# Patient Record
Sex: FEMALE | Race: WHITE | NOT HISPANIC OR LATINO | Employment: FULL TIME | URBAN - METROPOLITAN AREA
[De-identification: names, ages, dates, MRNs, and addresses within clinical notes are randomized per-mention and may not be internally consistent; named-entity substitution may affect disease eponyms.]

---

## 2017-05-22 ENCOUNTER — EH NON-PROVIDER (OUTPATIENT)
Dept: OCCUPATIONAL MEDICINE | Facility: CLINIC | Age: 22
End: 2017-05-22

## 2017-05-22 ENCOUNTER — EMPLOYEE HEALTH (OUTPATIENT)
Dept: OCCUPATIONAL MEDICINE | Facility: CLINIC | Age: 22
End: 2017-05-22

## 2017-05-22 VITALS
RESPIRATION RATE: 16 BRPM | OXYGEN SATURATION: 94 % | DIASTOLIC BLOOD PRESSURE: 82 MMHG | HEIGHT: 64 IN | TEMPERATURE: 98.2 F | BODY MASS INDEX: 19.12 KG/M2 | SYSTOLIC BLOOD PRESSURE: 108 MMHG | HEART RATE: 92 BPM | WEIGHT: 112 LBS

## 2017-05-22 DIAGNOSIS — Z02.1 PRE-EMPLOYMENT HEALTH SCREENING EXAMINATION: ICD-10-CM

## 2017-05-22 DIAGNOSIS — Z02.1 PRE-EMPLOYMENT DRUG SCREENING: ICD-10-CM

## 2017-05-22 LAB
AMP AMPHETAMINE: NORMAL
BAR BARBITURATES: NORMAL
BZO BENZODIAZEPINES: NORMAL
COC COCAINE: NORMAL
INT CON NEG: NORMAL
INT CON POS: NORMAL
MDMA ECSTASY: NORMAL
MET METHAMPHETAMINES: NORMAL
MTD METHADONE: NORMAL
OPI OPIATES: NORMAL
OXY OXYCODONE: NORMAL
PCP PHENCYCLIDINE: NORMAL
POC URINE DRUG SCREEN OCDRS: NEGATIVE
THC: NORMAL

## 2017-05-22 PROCEDURE — 90715 TDAP VACCINE 7 YRS/> IM: CPT | Performed by: PREVENTIVE MEDICINE

## 2017-05-22 PROCEDURE — 80305 DRUG TEST PRSMV DIR OPT OBS: CPT | Performed by: PREVENTIVE MEDICINE

## 2017-05-22 PROCEDURE — 8915 PR COMPREHENSIVE PHYSICAL: Performed by: PREVENTIVE MEDICINE

## 2017-05-22 ASSESSMENT — VISUAL ACUITY
OD_CC: 20/25
OS_CC: 20/25

## 2017-05-22 NOTE — PROGRESS NOTES
1. Drug Screen complete  2. Immunizations  - Quantiferon  draw  - Varicella  draw  - MMR  doc  - Hep B  doc  - Tdap  doc   - Flu   doc  3. Mask Fit capr only faield n95  4. Physical Clearance complete

## 2017-05-22 NOTE — MR AVS SNAPSHOT
"Barb Hilton   2017 9:20 AM    Non-Provider   MRN: [id number]    Department:  [facility]   Dept Phone:  [phone]    Description:  Female : 1995   Provider:  [facility] XI MATHIS RJONH           Reason for Visit     Other [facility] Pre-employment clearance      Allergies as of 2017     No Known Allergies      You were diagnosed with     Pre-employment drug screening   [127956]       Pre-employment health screening examination   [167785]         Vital Signs     Blood Pressure Pulse Temperature Respirations Height Weight    108/82 mmHg 92 36.8 °C (98.2 °F) 16 1.626 m (5' 4\") 50.803 kg (112 lb)    Body Mass Index Oxygen Saturation Smoking Status             19.22 kg/m2 94% Never Smoker          Basic Information     Date Of Birth Sex Race Ethnicity Preferred Language    1995 Female White,  or other  Non- English      Your appointments     May 31, 2017  7:40 AM   Established Patient with Ramila Lopez M.D.   [address] [address] [phone]           You will be receiving a confirmation call a few days before your appointment from our automated call confirmation system.   Please bring to your appointment; a photo ID, copies of your insurance card, all medication bottles and any co-pay that you are responsible for.  Please allow 45-60 minutes to complete your scheduled appointment.              Problem List              ICD-10-CM Priority Class Noted - Resolved    Anxiety F41.9   2016 - Present    Encounter for initial prescription of contraceptive pills Z30.011   11/15/2016 - Present      Health Maintenance        Date Due Completion Dates    IMM HEP B VACCINE (1 of 3 - Primary Series) 1995 ---    IMM HEP A VACCINE (1 of 2 - Standard Series) 1996 ---    IMM VARICELLA (CHICKENPOX) VACCINE (1 of 2 - 2 Dose Adolescent Series) 2008 ---    IMM MENINGOCOCCAL " VACCINE (MCV4) (1 of 1) 8/4/2011 ---    IMM DTaP/Tdap/Td Vaccine (1 - Tdap) 8/4/2014 ---    PAP SMEAR 12/6/2019 12/6/2016            Results     POCT 11 Panel Urine Drug Screen      Component    AMPHETAMINE    COCAINE    POC THC    METHAMPHETAMINES    OPIATES    PHENCYCLIDINE    BENZODIAZIPINES    BARBITURATES    METHADONE    MDMA Ecstasy    OXYCODONE    Urine Drug Screen    NEGATIVE    Internal Control Positive    Valid    Internal Control Negative    Valid                        Current Immunizations     HPV 9-VALENT VACCINE (GARDASIL 9) 12/4/2015    HPV Quadrivalent Vaccine (GARDASIL) 2/25/2015, 11/13/2014    Influenza Vaccine Quad Inj (Pf) 8/16/2016    Tdap Vaccine 5/22/2017 10:18 AM      Below and/or attached are the medications your provider expects you to take. Review all of your home medications and newly ordered medications with your provider and/or pharmacist. Follow medication instructions as directed by your provider and/or pharmacist. Please keep your medication list with you and share with your provider. Update the information when medications are discontinued, doses are changed, or new medications (including over-the-counter products) are added; and carry medication information at all times in the event of emergency situations     Allergies:  No Known Allergies          Medications  Valid as of: May 22, 2017 - 10:39 AM    Generic Name Brand Name Tablet Size Instructions for use    ALPRAZolam (Tab) XANAX 0.5 MG Take 1 Tab by mouth 2 times a day as needed for Sleep or Anxiety.        Escitalopram Oxalate (Tab) LEXAPRO 20 MG Take 1 Tab by mouth every day.        Fluticasone Propionate (Suspension) FLONASE 50 MCG/ACT Spray 1 Spray in nose every day.        Mupirocin (Ointment) BACTROBAN 2 % Apply 1 Application to affected area(s) 3 times a day.        Norethin Ace-Eth Estrad-FE (Tab) JUNEL FE 1/20 1-20 MG-MCG Take 1 Tab by mouth every day.        .                 Medicines prescribed today were sent to:      [facility] - [place], [place] - [address] AT [facility] [address]    [address] [place] [address]    Phone: [phone] Fax: [phone]    Open 24 Hours?: No      Medication refill instructions:       If your prescription bottle indicates you have medication refills left, it is not necessary to call your provider’s office. Please contact your pharmacy and they will refill your medication.    If your prescription bottle indicates you do not have any refills left, you may request refills at any time through one of the following ways: The online The[employer] system (except Urgent Care), by calling your provider’s office, or by asking your pharmacy to contact your provider’s office with a refill request. Medication refills are processed only during regular business hours and may not be available until the next business day. Your provider may request additional information or to have a follow-up visit with you prior to refilling your medication.   *Please Note: Medication refills are assigned a new Rx number when refilled electronically. Your pharmacy may indicate that no refills were authorized even though a new prescription for the same medication is available at the pharmacy. Please request the medicine by name with the pharmacy before contacting your provider for a refill.           TheTake Access Code: Activation code not generated  Current TheTake Status: Active

## 2017-05-22 NOTE — MR AVS SNAPSHOT
Barb Hilton   2017 10:00 AM   Employee Health   MRN: [id number]    Department:  [facility]   Dept Phone:  [phone]    Description:  Female : 1995   Provider:  Bo Kim M.D.           Reason for Visit     Other Procedure rm 1 physical clearance      Allergies as of 2017     No Known Allergies      You were diagnosed with     Pre-employment health screening examination   [903819]         Vital Signs     Smoking Status                   Never Smoker            Basic Information     Date Of Birth Sex Race Ethnicity Preferred Language    1995 Female White,  or other  Non- English      Your appointments     May 31, 2017  7:40 AM   Established Patient with Ramila Lopez M.D.   [address] [address] [phone]           You will be receiving a confirmation call a few days before your appointment from our automated call confirmation system.   Please bring to your appointment; a photo ID, copies of your insurance card, all medication bottles and any co-pay that you are responsible for.  Please allow 45-60 minutes to complete your scheduled appointment.              Problem List              ICD-10-CM Priority Class Noted - Resolved    Anxiety F41.9   2016 - Present    Encounter for initial prescription of contraceptive pills Z30.011   11/15/2016 - Present      Health Maintenance        Date Due Completion Dates    IMM HEP B VACCINE (1 of 3 - Primary Series) 1995 ---    IMM HEP A VACCINE (1 of 2 - Standard Series) 1996 ---    IMM VARICELLA (CHICKENPOX) VACCINE (1 of 2 - 2 Dose Adolescent Series) 2008 ---    IMM MENINGOCOCCAL VACCINE (MCV4) (1 of 1) 2011 ---    IMM DTaP/Tdap/Td Vaccine (1 - Tdap) 2014 ---    PAP SMEAR 2019            Current Immunizations     HPV 9-VALENT VACCINE (GARDASIL 9) 2015    HPV Quadrivalent  Vaccine (GARDASIL) 2/25/2015, 11/13/2014    Influenza Vaccine Quad Inj (Pf) 8/16/2016    Tdap Vaccine 5/22/2017 10:18 AM      Below and/or attached are the medications your provider expects you to take. Review all of your home medications and newly ordered medications with your provider and/or pharmacist. Follow medication instructions as directed by your provider and/or pharmacist. Please keep your medication list with you and share with your provider. Update the information when medications are discontinued, doses are changed, or new medications (including over-the-counter products) are added; and carry medication information at all times in the event of emergency situations     Allergies:  No Known Allergies          Medications  Valid as of: May 22, 2017 - 12:47 PM    Generic Name Brand Name Tablet Size Instructions for use    ALPRAZolam (Tab) XANAX 0.5 MG Take 1 Tab by mouth 2 times a day as needed for Sleep or Anxiety.        Escitalopram Oxalate (Tab) LEXAPRO 20 MG Take 1 Tab by mouth every day.        Fluticasone Propionate (Suspension) FLONASE 50 MCG/ACT Spray 1 Spray in nose every day.        Mupirocin (Ointment) BACTROBAN 2 % Apply 1 Application to affected area(s) 3 times a day.        Norethin Ace-Eth Estrad-FE (Tab) JUNEL FE 1/20 1-20 MG-MCG Take 1 Tab by mouth every day.        .                 Medicines prescribed today were sent to:     [facility] [address] [place] [address] - [address] AT [facility]. [address]    [address] [facility] [address]    Phone: [phone] Fax: [phone]    Open 24 Hours?: No      Medication refill instructions:       If your prescription bottle indicates you have medication refills left, it is not necessary to call your provider’s office. Please contact your pharmacy and they will refill your medication.    If your prescription bottle indicates you do not have any refills left, you may request refills at any time through one of the  following ways: The online [employer] system (except Urgent Care), by calling your provider’s office, or by asking your pharmacy to contact your provider’s office with a refill request. Medication refills are processed only during regular business hours and may not be available until the next business day. Your provider may request additional information or to have a follow-up visit with you prior to refilling your medication.   *Please Note: Medication refills are assigned a new Rx number when refilled electronically. Your pharmacy may indicate that no refills were authorized even though a new prescription for the same medication is available at the pharmacy. Please request the medicine by name with the pharmacy before contacting your provider for a refill.           [employer] Access Code: Activation code not generated  Current [employer] Status: Active

## 2017-05-31 ENCOUNTER — OFFICE VISIT (OUTPATIENT)
Dept: MEDICAL GROUP | Facility: PHYSICIAN GROUP | Age: 22
End: 2017-05-31
Payer: COMMERCIAL

## 2017-05-31 VITALS
OXYGEN SATURATION: 97 % | RESPIRATION RATE: 12 BRPM | HEART RATE: 86 BPM | HEIGHT: 64 IN | BODY MASS INDEX: 18.95 KG/M2 | TEMPERATURE: 97.6 F | SYSTOLIC BLOOD PRESSURE: 112 MMHG | WEIGHT: 111 LBS | DIASTOLIC BLOOD PRESSURE: 72 MMHG

## 2017-05-31 DIAGNOSIS — F41.9 ANXIETY: ICD-10-CM

## 2017-05-31 PROCEDURE — 99213 OFFICE O/P EST LOW 20 MIN: CPT | Performed by: FAMILY MEDICINE

## 2017-05-31 RX ORDER — ESCITALOPRAM OXALATE 20 MG/1
20 TABLET ORAL DAILY
Qty: 90 TAB | Refills: 3 | Status: SHIPPED | OUTPATIENT
Start: 2017-05-31 | End: 2018-08-09 | Stop reason: SDUPTHER

## 2017-05-31 RX ORDER — ALPRAZOLAM 0.5 MG/1
0.5 TABLET ORAL 2 TIMES DAILY PRN
Qty: 30 TAB | Refills: 1 | Status: SHIPPED
Start: 2017-05-31 | End: 2019-12-11 | Stop reason: SDUPTHER

## 2017-05-31 ASSESSMENT — PATIENT HEALTH QUESTIONNAIRE - PHQ9: CLINICAL INTERPRETATION OF PHQ2 SCORE: 0

## 2017-05-31 NOTE — PROGRESS NOTES
Chief Complaint   Patient presents with   • Medication Refill       HISTORY OF PRESENT ILLNESS: Patient is a 21 y.o. female established patient here today for the following concerns:    1. Anxiety  Here for follow up on anxiety.  Reports that the mood has been doing good.  She is in nursing school now.  Reports mostly anxiety about typical things which she feels is a normal response.  She has only had to use xanax a handful of times.  She does need a refill, but requests a smaller quantity so they don't .  No SI/HI>  Sleep is restorative.       Past Medical, Social, and Family history reviewed and updated in EPIC    Allergies:Review of patient's allergies indicates no known allergies.    Current Outpatient Prescriptions   Medication Sig Dispense Refill   • escitalopram (LEXAPRO) 20 MG tablet Take 1 Tab by mouth every day. 90 Tab 3   • alprazolam (XANAX) 0.5 MG Tab Take 1 Tab by mouth 2 times a day as needed for Sleep or Anxiety. 30 Tab 1   • norethindrone-ethinyl estradiol (MICROGESTIN FE ) 1-20 MG-MCG per tablet Take 1 Tab by mouth every day. 90 Tab 3   • mupirocin (BACTROBAN) 2 % Ointment Apply 1 Application to affected area(s) 3 times a day. 1 Tube 0   • fluticasone (FLONASE) 50 MCG/ACT nasal spray Spray 1 Spray in nose every day. 16 g 0     No current facility-administered medications for this visit.         ROS:  Review of Systems   Constitutional: Negative for fever, chills, weight loss and malaise/fatigue.   HENT: Negative for ear pain, nosebleeds, congestion, sore throat and neck pain.    Eyes: Negative for blurred vision.   Respiratory: Negative for cough, sputum production, shortness of breath and wheezing.    Cardiovascular: Negative for chest pain, palpitations,  and leg swelling.   Gastrointestinal: Negative for heartburn, nausea, vomiting, diarrhea and abdominal pain.   Genitourinary: Negative for dysuria, urgency and frequency.   Musculoskeletal: Negative for myalgias, back pain and joint  "pain.   Skin: Negative for rash and itching.   Neurological: Negative for dizziness, tingling, tremors, sensory change, focal weakness and headaches.   Endo/Heme/Allergies: Does not bruise/bleed easily.   Psychiatric/Behavioral: Negative for depression, anxiety, suicidal ideas, insomnia and memory loss.      Exam:  Blood pressure 112/72, pulse 86, temperature 36.4 °C (97.6 °F), resp. rate 12, height 1.619 m (5' 3.74\"), weight 50.349 kg (111 lb), last menstrual period 05/23/2017, SpO2 97 %.    General:  Well nourished, well developed in NAD  Head is grossly normal.  Neck: Supple without JVD   Pulmonary:  Normal effort.   Cardiovascular: Regular rate  Extremities: no clubbing, cyanosis, or edema.  Psych: affect appropriate      Please note that this dictation was created using voice recognition software. I have made every reasonable attempt to correct obvious errors, but I expect that there are errors of grammar and possibly content that I did not discover before finalizing the note.    Assessment/Plan:  1. Anxiety  continue  - escitalopram (LEXAPRO) 20 MG tablet; Take 1 Tab by mouth every day.  Dispense: 90 Tab; Refill: 3  - alprazolam (XANAX) 0.5 MG Tab; Take 1 Tab by mouth 2 times a day as needed for Sleep or Anxiety.  Dispense: 30 Tab; Refill: 1    No Follow-up on file.          "

## 2017-05-31 NOTE — MR AVS SNAPSHOT
"        Barb Hilton   2017 7:40 AM   Office Visit   MRN: [id number]    Department:  [facility]   Dept Phone:  [phone]    Description:  Female : 1995   Provider:  Ramila Lopez M.D.           Reason for Visit     Medication Refill           Allergies as of 2017     No Known Allergies      You were diagnosed with     Anxiety   [931122]         Vital Signs     Blood Pressure Pulse Temperature Respirations Height Weight    112/72 mmHg 86 36.4 °C (97.6 °F) 12 1.619 m (5' 3.74\") 50.349 kg (111 lb)    Body Mass Index Oxygen Saturation Last Menstrual Period Smoking Status          19.21 kg/m2 97% 2017 Never Smoker         Basic Information     Date Of Birth Sex Race Ethnicity Preferred Language    1995 Female White,  or other  Non- English      Problem List              ICD-10-CM Priority Class Noted - Resolved    Anxiety F41.9   2016 - Present    Encounter for initial prescription of contraceptive pills Z30.011   11/15/2016 - Present      Health Maintenance        Date Due Completion Dates    IMM HEP A VACCINE (1 of 2 - Standard Series) 1996 ---    IMM VARICELLA (CHICKENPOX) VACCINE (1 of 2 - 2 Dose Adolescent Series) 2008 ---    IMM MENINGOCOCCAL VACCINE (MCV4) (1 of 1) 2011 ---    PAP SMEAR 2019    IMM DTaP/Tdap/Td Vaccine (2 - Td) 2027            Current Immunizations     HPV 9-VALENT VACCINE (GARDASIL 9) 2015    HPV Quadrivalent Vaccine (GARDASIL) 2015, 2014    Hepatitis B Vaccine Non-Recombivax (Ped/Adol) 5/3/1996, 1995, 1995    Influenza TIV (IM) 2016    Influenza Vaccine Quad Inj (Pf) 2016    MMR Vaccine 1999, 1996    Tdap Vaccine 2017 10:18 AM      Below and/or attached are the medications your provider expects you to take. Review all of your home medications and newly ordered medications with your provider and/or pharmacist. Follow " medication instructions as directed by your provider and/or pharmacist. Please keep your medication list with you and share with your provider. Update the information when medications are discontinued, doses are changed, or new medications (including over-the-counter products) are added; and carry medication information at all times in the event of emergency situations     Allergies:  No Known Allergies          Medications  Valid as of: May 31, 2017 -  8:28 AM    Generic Name Brand Name Tablet Size Instructions for use    ALPRAZolam (Tab) XANAX 0.5 MG Take 1 Tab by mouth 2 times a day as needed for Sleep or Anxiety.        Escitalopram Oxalate (Tab) LEXAPRO 20 MG Take 1 Tab by mouth every day.        Fluticasone Propionate (Suspension) FLONASE 50 MCG/ACT Spray 1 Spray in nose every day.        Mupirocin (Ointment) BACTROBAN 2 % Apply 1 Application to affected area(s) 3 times a day.        Norethin Ace-Eth Estrad-FE (Tab) JUNEL FE 1/20 1-20 MG-MCG Take 1 Tab by mouth every day.        .                 Medicines prescribed today were sent to:     [facility] [address] [place] [address] [place], [place] - [address] AT [facility] OF [employer] [facility]. & [address] [place]    [address] [place] [address]    Phone: [phone] Fax: [phone]    Open 24 Hours?: No      Medication refill instructions:       If your prescription bottle indicates you have medication refills left, it is not necessary to call your provider’s office. Please contact your pharmacy and they will refill your medication.    If your prescription bottle indicates you do not have any refills left, you may request refills at any time through one of the following ways: The online [employer] system (except Urgent Care), by calling your provider’s office, or by asking your pharmacy to contact your provider’s office with a refill request. Medication refills are processed only during regular business hours and may not be available until the next business day. Your provider  may request additional information or to have a follow-up visit with you prior to refilling your medication.   *Please Note: Medication refills are assigned a new Rx number when refilled electronically. Your pharmacy may indicate that no refills were authorized even though a new prescription for the same medication is available at the pharmacy. Please request the medicine by name with the pharmacy before contacting your provider for a refill.           [employer] Access Code: Activation code not generated  Current [employer] Status: Active

## 2017-09-28 ENCOUNTER — EH NON-PROVIDER (OUTPATIENT)
Dept: OCCUPATIONAL MEDICINE | Facility: CLINIC | Age: 22
End: 2017-09-28

## 2017-09-28 DIAGNOSIS — Z29.89 NEED FOR ISOLATION: ICD-10-CM

## 2017-09-28 PROCEDURE — 94375 RESPIRATORY FLOW VOLUME LOOP: CPT

## 2017-11-11 DIAGNOSIS — Z30.011 ENCOUNTER FOR INITIAL PRESCRIPTION OF CONTRACEPTIVE PILLS: ICD-10-CM

## 2017-11-11 RX ORDER — NORETHINDRONE ACETATE AND ETHINYL ESTRADIOL AND FERROUS FUMARATE 1MG-20(21)
KIT ORAL
Qty: 84 TAB | Refills: 0 | Status: SHIPPED | OUTPATIENT
Start: 2017-11-11 | End: 2018-01-29 | Stop reason: SDUPTHER

## 2017-12-06 ENCOUNTER — OFFICE VISIT (OUTPATIENT)
Dept: MEDICAL GROUP | Facility: PHYSICIAN GROUP | Age: 22
End: 2017-12-06
Payer: COMMERCIAL

## 2017-12-06 ENCOUNTER — HOSPITAL ENCOUNTER (OUTPATIENT)
Facility: MEDICAL CENTER | Age: 22
End: 2017-12-06
Attending: FAMILY MEDICINE
Payer: COMMERCIAL

## 2017-12-06 VITALS
WEIGHT: 114.2 LBS | RESPIRATION RATE: 14 BRPM | SYSTOLIC BLOOD PRESSURE: 122 MMHG | BODY MASS INDEX: 19.5 KG/M2 | TEMPERATURE: 97.3 F | HEART RATE: 76 BPM | HEIGHT: 64 IN | DIASTOLIC BLOOD PRESSURE: 82 MMHG | OXYGEN SATURATION: 98 %

## 2017-12-06 DIAGNOSIS — Z00.00 WELL ADULT EXAM: ICD-10-CM

## 2017-12-06 DIAGNOSIS — N92.1 METRORRHAGIA: ICD-10-CM

## 2017-12-06 DIAGNOSIS — Z11.3 ROUTINE SCREENING FOR STI (SEXUALLY TRANSMITTED INFECTION): ICD-10-CM

## 2017-12-06 PROCEDURE — 87591 N.GONORRHOEAE DNA AMP PROB: CPT

## 2017-12-06 PROCEDURE — 87491 CHLMYD TRACH DNA AMP PROBE: CPT

## 2017-12-06 PROCEDURE — 99395 PREV VISIT EST AGE 18-39: CPT | Performed by: FAMILY MEDICINE

## 2017-12-06 ASSESSMENT — PAIN SCALES - GENERAL: PAINLEVEL: 6=MODERATE PAIN

## 2017-12-06 NOTE — PROGRESS NOTES
Chief Complaint   Patient presents with   • Annual Exam   • Medication Management     birth control       HISTORY OF PRESENT ILLNESS: Patient is a 22 y.o. female established patient here today for the following concerns:    Here today for annual preventive exam.  She is up to date on pap and pelvic exam, due for GC/CHL.  She just recently got engaged!  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  She is hoping to get a referral to get IUD placed as she does sometimes forget her birth control which leads to irregular bleeding.        Past Medical, Social, and Family history reviewed and updated in EPIC    Allergies:Patient has no known allergies.    Current Outpatient Prescriptions   Medication Sig Dispense Refill   • MICROGESTIN FE 1/20 1-20 MG-MCG per tablet TAKE 1 TABLET BY MOUTH EVERY DAY 84 Tab 0   • escitalopram (LEXAPRO) 20 MG tablet Take 1 Tab by mouth every day. 90 Tab 3   • alprazolam (XANAX) 0.5 MG Tab Take 1 Tab by mouth 2 times a day as needed for Sleep or Anxiety. 30 Tab 1   • mupirocin (BACTROBAN) 2 % Ointment Apply 1 Application to affected area(s) 3 times a day. 1 Tube 0     No current facility-administered medications for this visit.          ROS:  Review of Systems   Constitutional: Negative for fever, chills, weight loss and malaise/fatigue.   HENT: Negative for ear pain, nosebleeds, congestion, sore throat and neck pain.    Eyes: Negative for blurred vision.   Respiratory: Negative for cough, sputum production, shortness of breath and wheezing.    Cardiovascular: Negative for chest pain, palpitations,  and leg swelling.   Gastrointestinal: Negative for heartburn, nausea, vomiting, diarrhea and abdominal pain.   Genitourinary: Negative for dysuria, urgency and frequency.   Musculoskeletal: Negative for myalgias, back pain and joint pain.   Skin: Negative for rash and itching.   Neurological: Negative for dizziness, tingling, tremors, sensory change, focal weakness and headaches.  "  Endo/Heme/Allergies: Does not bruise/bleed easily.   Psychiatric/Behavioral: Negative for depression, anxiety, suicidal ideas, insomnia and memory loss.      Exam:  Blood pressure 122/82, pulse 76, temperature 36.3 °C (97.3 °F), resp. rate 14, height 1.619 m (5' 3.74\"), weight 51.8 kg (114 lb 3.2 oz), last menstrual period 12/05/2017, SpO2 98 %.    General:  Well nourished, well developed in NAD  Head is grossly normal.  Neck: Supple without JVD   Pulmonary:  Normal effort.   Cardiovascular: Regular rate  Extremities: no clubbing, cyanosis, or edema.  Psych: affect appropriate    Please note that this dictation was created using voice recognition software. I have made every reasonable attempt to correct obvious errors, but I expect that there are errors of grammar and possibly content that I did not discover before finalizing the note.    Assessment/Plan:  Encounter Diagnoses   Name Primary?   • Well adult exam    • Routine screening for STI (sexually transmitted infection)    • Metrorrhagia    GC CHL today  PAP due next year  Referral to GYN for consideration of IUD         "

## 2017-12-07 DIAGNOSIS — Z11.3 ROUTINE SCREENING FOR STI (SEXUALLY TRANSMITTED INFECTION): ICD-10-CM

## 2017-12-07 LAB
C TRACH DNA SPEC QL NAA+PROBE: NEGATIVE
N GONORRHOEA DNA SPEC QL NAA+PROBE: NEGATIVE
SPECIMEN SOURCE: NORMAL

## 2018-01-29 DIAGNOSIS — Z30.011 ENCOUNTER FOR INITIAL PRESCRIPTION OF CONTRACEPTIVE PILLS: ICD-10-CM

## 2018-01-29 RX ORDER — NORETHINDRONE ACETATE AND ETHINYL ESTRADIOL 1MG-20(21)
1 KIT ORAL
Qty: 84 TAB | Refills: 0 | Status: SHIPPED | OUTPATIENT
Start: 2018-01-29 | End: 2018-04-30 | Stop reason: SDUPTHER

## 2018-01-29 NOTE — TELEPHONE ENCOUNTER
Was the patient seen in the last year in this department? Yes 12/06/2017    Does patient have an active prescription for medications requested? No     Received Request Via: Patient

## 2018-02-03 ENCOUNTER — OFFICE VISIT (OUTPATIENT)
Dept: URGENT CARE | Facility: CLINIC | Age: 23
End: 2018-02-03
Payer: COMMERCIAL

## 2018-02-03 VITALS
OXYGEN SATURATION: 96 % | RESPIRATION RATE: 16 BRPM | WEIGHT: 113 LBS | TEMPERATURE: 97.6 F | HEART RATE: 84 BPM | BODY MASS INDEX: 20.02 KG/M2 | HEIGHT: 63 IN | DIASTOLIC BLOOD PRESSURE: 80 MMHG | SYSTOLIC BLOOD PRESSURE: 116 MMHG

## 2018-02-03 DIAGNOSIS — N30.90 CYSTITIS: ICD-10-CM

## 2018-02-03 LAB
APPEARANCE UR: NORMAL
BILIRUB UR STRIP-MCNC: NORMAL MG/DL
COLOR UR AUTO: YELLOW
GLUCOSE UR STRIP.AUTO-MCNC: NORMAL MG/DL
KETONES UR STRIP.AUTO-MCNC: NORMAL MG/DL
LEUKOCYTE ESTERASE UR QL STRIP.AUTO: NORMAL
NITRITE UR QL STRIP.AUTO: NORMAL
PH UR STRIP.AUTO: 7 [PH] (ref 5–8)
PROT UR QL STRIP: NORMAL MG/DL
RBC UR QL AUTO: NORMAL
SP GR UR STRIP.AUTO: 1
UROBILINOGEN UR STRIP-MCNC: NORMAL MG/DL

## 2018-02-03 PROCEDURE — 81002 URINALYSIS NONAUTO W/O SCOPE: CPT | Performed by: PHYSICIAN ASSISTANT

## 2018-02-03 PROCEDURE — 99213 OFFICE O/P EST LOW 20 MIN: CPT | Performed by: PHYSICIAN ASSISTANT

## 2018-02-03 RX ORDER — CEFDINIR 300 MG/1
600 CAPSULE ORAL DAILY
Qty: 10 CAP | Refills: 0 | Status: SHIPPED | OUTPATIENT
Start: 2018-02-03 | End: 2018-02-08

## 2018-02-03 ASSESSMENT — ENCOUNTER SYMPTOMS
CONSTITUTIONAL NEGATIVE: 1
FLANK PAIN: 0
ABDOMINAL PAIN: 1
FEVER: 0
MUSCULOSKELETAL NEGATIVE: 1

## 2018-02-03 NOTE — PROGRESS NOTES
"Subjective:      Barb Hilton is a 22 y.o. female who presents with Urinary Frequency (x 4 days, urinary frequency, burning with urination)            Urinary Frequency   This is a new problem. The current episode started in the past 7 days. The problem occurs constantly. The problem has been unchanged. Associated symptoms include abdominal pain and urinary symptoms. Pertinent negatives include no fever. Nothing aggravates the symptoms. She has tried nothing for the symptoms. The treatment provided no relief.       Review of Systems   Constitutional: Negative.  Negative for fever.   Gastrointestinal: Positive for abdominal pain.   Genitourinary: Positive for dysuria, frequency and urgency. Negative for flank pain and hematuria.   Musculoskeletal: Negative.    Skin: Negative.           Objective:     /80   Pulse 84   Temp 36.4 °C (97.6 °F)   Resp 16   Ht 1.6 m (5' 3\")   Wt 51.3 kg (113 lb)   SpO2 96%   BMI 20.02 kg/m²      Physical Exam   Constitutional: She is oriented to person, place, and time. She appears well-developed and well-nourished. No distress.   Abdominal: She exhibits no distension. There is no tenderness.   No flank or CVAT     Neurological: She is alert and oriented to person, place, and time.   Skin: Skin is warm and dry.   Psychiatric: She has a normal mood and affect. Her behavior is normal.   Nursing note and vitals reviewed.          Vitals:    02/03/18 0924   BP: 116/80   Pulse: 84   Resp: 16   Temp: 36.4 °C (97.6 °F)   SpO2: 96%   Weight: 51.3 kg (113 lb)   Height: 1.6 m (5' 3\")     Active Ambulatory Problems     Diagnosis Date Noted   • Anxiety 04/22/2016   • Encounter for initial prescription of contraceptive pills 11/15/2016     Resolved Ambulatory Problems     Diagnosis Date Noted   • No Resolved Ambulatory Problems     Past Medical History:   Diagnosis Date   • Active medical problems: none      Current Outpatient Prescriptions on File Prior to Visit   Medication Sig Dispense " Refill   • norethindrone-ethinyl estradiol (MICROGESTIN FE 1/20) 1-20 MG-MCG per tablet Take 1 Tab by mouth every day. 84 Tab 0   • escitalopram (LEXAPRO) 20 MG tablet Take 1 Tab by mouth every day. 90 Tab 3   • alprazolam (XANAX) 0.5 MG Tab Take 1 Tab by mouth 2 times a day as needed for Sleep or Anxiety. 30 Tab 1   • mupirocin (BACTROBAN) 2 % Ointment Apply 1 Application to affected area(s) 3 times a day. 1 Tube 0     No current facility-administered medications on file prior to visit.      Social History     Social History   • Marital status: Unknown     Spouse name: N/A   • Number of children: N/A   • Years of education: N/A     Occupational History   •  [employer]   •       [employer] - nursing     Social History Main Topics   • Smoking status: Never Smoker   • Smokeless tobacco: Never Used   • Alcohol use 0.0 oz/week      Comment: occ 1 every few months   • Drug use: No   • Sexual activity: Yes     Partners: Male     Birth control/ protection: Condom, Pill     Other Topics Concern   • Not on file     Social History Narrative   • No narrative on file     Family History   Problem Relation Age of Onset   • Heart Disease Father      50     Patient has no known allergies.    ua+     Assessment/Plan:     1. Cystitis    · rx abx; cx

## 2018-02-13 ENCOUNTER — GYNECOLOGY VISIT (OUTPATIENT)
Dept: OBGYN | Facility: CLINIC | Age: 23
End: 2018-02-13
Payer: COMMERCIAL

## 2018-02-13 ENCOUNTER — TELEPHONE (OUTPATIENT)
Dept: OBGYN | Facility: CLINIC | Age: 23
End: 2018-02-13

## 2018-02-13 VITALS
DIASTOLIC BLOOD PRESSURE: 78 MMHG | HEIGHT: 63 IN | SYSTOLIC BLOOD PRESSURE: 106 MMHG | BODY MASS INDEX: 20.02 KG/M2 | WEIGHT: 113 LBS

## 2018-02-13 DIAGNOSIS — Z30.09 COUNSELING FOR BIRTH CONTROL REGARDING INTRAUTERINE DEVICE (IUD): ICD-10-CM

## 2018-02-13 PROCEDURE — 99203 OFFICE O/P NEW LOW 30 MIN: CPT | Performed by: OBSTETRICS & GYNECOLOGY

## 2018-02-13 ASSESSMENT — ENCOUNTER SYMPTOMS
DIZZINESS: 0
PHOTOPHOBIA: 0
MYALGIAS: 0
COUGH: 0
NAUSEA: 0
DEPRESSION: 0
CHILLS: 0
DOUBLE VISION: 0
FEVER: 0
BLURRED VISION: 0
HEARTBURN: 0

## 2018-02-13 NOTE — PROGRESS NOTES
"Subjective:      Barb Hilton is a 22 y.o. female who presents with Other (NEW GYN-Discuss IUD)            22-year-old G0 here today to discuss potential intrauterine device. Patient's last menstrual period was recent. She is currently using Microgestin for birth control and doing well on this medication. She states occasionally she has not much of a period at all and sometimes she has a fairly heavy menstrual cycle. Otherwise the patient is without complaints today she denies pelvic pain or pressure no abnormal vaginal discharge.    Pap history no history of abnormal Pap smears or sexually transmitted diseases  Patient had recent Pap smear and STD screening               Review of Systems   Constitutional: Negative for chills and fever.   HENT: Negative for hearing loss.    Eyes: Negative for blurred vision, double vision and photophobia.   Respiratory: Negative for cough.    Cardiovascular: Negative for chest pain.   Gastrointestinal: Negative for heartburn and nausea.   Genitourinary: Negative for dysuria and urgency.   Musculoskeletal: Negative for myalgias.   Skin: Negative for rash.   Neurological: Negative for dizziness.   Psychiatric/Behavioral: Negative for depression.          Objective:     /78   Ht 1.6 m (5' 3\")   Wt 51.3 kg (113 lb)   LMP 01/25/2018   Breastfeeding? No   BMI 20.02 kg/m²      Physical Exam   Constitutional: She is oriented to person, place, and time.   Neurological: She is alert and oriented to person, place, and time.   Skin: Skin is warm and dry.   Nursing note and vitals reviewed.    Remainder of physical exam is deferred          Assessment/Plan:     1. Counseling for birth control regarding intrauterine device (IUD)  Today I discussed with the patient different types of IUDs  #1 we discussed ParaGard, 10 years Copper IUD with no hormones  Patient should expect regular periods however may get increased cramping or bleeding with her menses.  #2 Abdifatah and Liletta, both of " these contained 52 mg of a progesterone. They're both good for 5 years  They are similar in size to the ParaGard, patient could expect amenorrhea, irregular bleeding and spotting, daily bleeding or spotting  #3Kyleena, also a 5 year IUD with progesterone  Bleeding profile not quite as good as Mirena however the IUD itself is smaller in size and thus may be easier to insert for a patient who is nulliparous  #4 Lizbeth 3 year IUD with progesterone  Bleeding profile anywhere from amenorrhea to regular menstrual cycles, smaller in size and may be easier to insert    After thorough discussion the patient is desiring to have a Kyleena    Discussed with patient office procedure for obtaining IUDs, patient will have device ordered through specialty pharmacy once it arrives and we'll schedule placement    Today the patient is counseled on the risks of IUD insertion. Specifically discussed were alternative forms of birth control. I also discussed with the patient the risk of infection on insertion, and had asked the patient to remain on pelvic rest for one week following the insertion. We also discussed the risk of IUD expulsion, the risk of uterine perforation and IUD migration. If the IUD does migrate the patient may require a separate procedure such as a laparoscopy to retrieve the migrated IUD. I also discussed the 1% risk of pregnancy with IUD use. Also discussed with patient today increased risk of ectopic pregnancy with IUD use. Discussed specific side effects of ParaGard IUD which can be increased vaginal bleeding during menses or increased dysmenorrhea. Also discussed today the possibility that IUD may need to be removed secondary to bleeding profile or pain. Also discussed were the possibility that partner can feel the IUD during intercourse. I also discussed the side effects of Mirena which can be amenorrhea or dysfunctional uterine bleeding or spotting.  Patient had the opportunity to ask questions regarding  insertion, risks and benefits, all questions are answered in their entirety.  Informed consent is signed    greater than 50 %  Of this 30 minute visit was used for face to face  counseling and coordination of care for this patient and her medical conditions

## 2018-02-13 NOTE — TELEPHONE ENCOUNTER
Called pt to come back and sign Mirena form. She states she will turn around and come back to the office.

## 2018-03-16 ENCOUNTER — OFFICE VISIT (OUTPATIENT)
Dept: URGENT CARE | Facility: CLINIC | Age: 23
End: 2018-03-16
Payer: COMMERCIAL

## 2018-03-16 VITALS
BODY MASS INDEX: 20.31 KG/M2 | TEMPERATURE: 97.9 F | HEIGHT: 63 IN | DIASTOLIC BLOOD PRESSURE: 72 MMHG | WEIGHT: 114.64 LBS | HEART RATE: 85 BPM | SYSTOLIC BLOOD PRESSURE: 118 MMHG | OXYGEN SATURATION: 95 %

## 2018-03-16 DIAGNOSIS — J01.00 ACUTE NON-RECURRENT MAXILLARY SINUSITIS: ICD-10-CM

## 2018-03-16 LAB
INT CON NEG: NORMAL
INT CON POS: NORMAL
S PYO AG THROAT QL: NEGATIVE

## 2018-03-16 PROCEDURE — 99214 OFFICE O/P EST MOD 30 MIN: CPT | Performed by: FAMILY MEDICINE

## 2018-03-16 PROCEDURE — 87880 STREP A ASSAY W/OPTIC: CPT | Performed by: FAMILY MEDICINE

## 2018-03-16 RX ORDER — AMOXICILLIN AND CLAVULANATE POTASSIUM 875; 125 MG/1; MG/1
1 TABLET, FILM COATED ORAL 2 TIMES DAILY
Qty: 20 TAB | Refills: 0 | Status: SHIPPED | OUTPATIENT
Start: 2018-03-16 | End: 2018-03-26

## 2018-03-16 NOTE — PROGRESS NOTES
"Chief Complaint   Patient presents with   • Pharyngitis     with congestion, cough, runny nose x3 weeks         Sinusitis  This is a new problem. The current episode started in the past 20 days. The problem has been gradually worsening since onset. There has been no fever. Associated symptoms include congestion, sore throat,  coughing, headaches and sinus pressure. Pertinent negatives include no sneezing or sore throat. Past treatments include nothing.     Social History   Substance Use Topics   • Smoking status: Never Smoker   • Smokeless tobacco: Never Used   • Alcohol use 0.0 oz/week      Comment: occ 1 every few months         Current Outpatient Prescriptions on File Prior to Visit   Medication Sig Dispense Refill   • norethindrone-ethinyl estradiol (MICROGESTIN FE 1/20) 1-20 MG-MCG per tablet Take 1 Tab by mouth every day. 84 Tab 0   • escitalopram (LEXAPRO) 20 MG tablet Take 1 Tab by mouth every day. 90 Tab 3   • alprazolam (XANAX) 0.5 MG Tab Take 1 Tab by mouth 2 times a day as needed for Sleep or Anxiety. 30 Tab 1   • mupirocin (BACTROBAN) 2 % Ointment Apply 1 Application to affected area(s) 3 times a day. 1 Tube 0     No current facility-administered medications on file prior to visit.            No Known Allergies      Family history was reviewed and not pertinent     Review of Systems   Constitutional: Negative for fever.   HENT: Positive for congestion and sinus pressure. Negative for sneezing    Respiratory: Positive for cough.    Neurological: Positive for headaches.   All other systems reviewed and are negative.         Objective:     Blood pressure 118/72, pulse 85, temperature 36.6 °C (97.9 °F), height 1.6 m (5' 2.99\"), weight 52 kg (114 lb 10.2 oz), SpO2 95 %.      Physical Exam   Constitutional: patient is oriented to person, place, and time. Patient appears well-developed and well-nourished.   HENT:   Head: Normocephalic and atraumatic.   Right Ear: Hearing, tympanic membrane and external ear " normal.   Left Ear: Hearing, tympanic membrane and external ear normal.   Nose: Mucosal edema and rhinorrhea present.  . No epistaxis.  Left and right sinus tenderness noted  Mouth/Throat: Uvula is midline and mucous membranes are normal. Oropharyngeal exudate present. No posterior oropharyngeal edema or posterior oropharyngeal erythema.   Eyes: Conjunctivae and EOM are normal. Pupils are equal, round, and reactive to light. Left and right eyes - no discharge. No scleral icterus.   Neck: Normal range of motion. Neck supple. No JVD present. No tracheal deviation present. No thyromegaly present.   Cardiovascular: Normal rate, regular rhythm, normal heart sounds and intact distal pulses.    No murmur heard.  Pulmonary/Chest: Breath sounds normal. No respiratory distress.   no wheezes, rales.      Musculoskeletal: Normal range of motion.   no edema.   Lymphadenopathy:     There is positive  cervical adenopathy.   Neurological:   alert and oriented to person, place, and time.   Skin: Skin is warm and dry. No erythema.   Psychiatric:   normal mood and affect.  behavior is normal.   Nursing note and vitals reviewed.          Assessment/Plan:     1. Acute non-recurrent maxillary sinusitis  Rapid strep neg    - amoxicillin-clavulanate (AUGMENTIN) 875-125 MG Tab; Take 1 Tab by mouth 2 times a day for 10 days.  Dispense: 20 Tab; Refill: 0       Follow up in one week if no improvement, sooner if symptoms worsen.         Patient was advised that antibiotics may interfere with the effectiveness of oral contraceptives and was advised to abstain from sexual activity while taking the antibiotic.

## 2018-04-30 DIAGNOSIS — Z30.011 ENCOUNTER FOR INITIAL PRESCRIPTION OF CONTRACEPTIVE PILLS: ICD-10-CM

## 2018-05-01 RX ORDER — NORETHINDRONE ACETATE AND ETHINYL ESTRADIOL 1MG-20(21)
1 KIT ORAL
Qty: 84 TAB | Refills: 0 | Status: SHIPPED | OUTPATIENT
Start: 2018-05-01 | End: 2018-07-17 | Stop reason: SDUPTHER

## 2018-06-28 ENCOUNTER — HOSPITAL ENCOUNTER (OUTPATIENT)
Dept: LAB | Facility: MEDICAL CENTER | Age: 23
End: 2018-06-28
Payer: COMMERCIAL

## 2018-06-28 LAB
BDY FAT % MEASURED: 19.3 %
BP DIAS: 61 MMHG
BP SYS: 114 MMHG
CHOLEST SERPL-MCNC: 177 MG/DL (ref 100–199)
DIABETES HTDIA: NO
EVENT NAME HTEVT: NORMAL
FASTING HTFAS: YES
GLUCOSE SERPL-MCNC: 83 MG/DL (ref 65–99)
HDLC SERPL-MCNC: 50 MG/DL
HYPERTENSION HTHYP: NO
LDLC SERPL CALC-MCNC: 104 MG/DL
SCREENING LOC CITY HTCIT: NORMAL
SCREENING LOC STATE HTSTA: NORMAL
SCREENING LOCATION HTLOC: NORMAL
SMOKING HTSMO: NO
SUBSCRIBER ID HTSID: NORMAL
TRIGL SERPL-MCNC: 116 MG/DL (ref 0–149)

## 2018-07-04 ENCOUNTER — OFFICE VISIT (OUTPATIENT)
Dept: URGENT CARE | Facility: PHYSICIAN GROUP | Age: 23
End: 2018-07-04
Payer: COMMERCIAL

## 2018-07-04 ENCOUNTER — HOSPITAL ENCOUNTER (OUTPATIENT)
Facility: MEDICAL CENTER | Age: 23
End: 2018-07-04
Attending: PHYSICIAN ASSISTANT
Payer: COMMERCIAL

## 2018-07-04 VITALS
BODY MASS INDEX: 18.95 KG/M2 | WEIGHT: 111 LBS | HEIGHT: 64 IN | HEART RATE: 85 BPM | SYSTOLIC BLOOD PRESSURE: 98 MMHG | OXYGEN SATURATION: 95 % | DIASTOLIC BLOOD PRESSURE: 58 MMHG | TEMPERATURE: 99.1 F

## 2018-07-04 DIAGNOSIS — N30.01 ACUTE CYSTITIS WITH HEMATURIA: ICD-10-CM

## 2018-07-04 LAB
APPEARANCE UR: CLEAR
BILIRUB UR STRIP-MCNC: NEGATIVE MG/DL
COLOR UR AUTO: NORMAL
GLUCOSE UR STRIP.AUTO-MCNC: NEGATIVE MG/DL
INT CON NEG: NEGATIVE
INT CON POS: POSITIVE
KETONES UR STRIP.AUTO-MCNC: 40 MG/DL
LEUKOCYTE ESTERASE UR QL STRIP.AUTO: NORMAL
NITRITE UR QL STRIP.AUTO: NEGATIVE
PH UR STRIP.AUTO: 6.5 [PH] (ref 5–8)
POC URINE PREGNANCY TEST: NEGATIVE
PROT UR QL STRIP: NEGATIVE MG/DL
RBC UR QL AUTO: NORMAL
SP GR UR STRIP.AUTO: 1
UROBILINOGEN UR STRIP-MCNC: NEGATIVE MG/DL

## 2018-07-04 PROCEDURE — 87086 URINE CULTURE/COLONY COUNT: CPT

## 2018-07-04 PROCEDURE — 87077 CULTURE AEROBIC IDENTIFY: CPT

## 2018-07-04 PROCEDURE — 81002 URINALYSIS NONAUTO W/O SCOPE: CPT | Performed by: PHYSICIAN ASSISTANT

## 2018-07-04 PROCEDURE — 99214 OFFICE O/P EST MOD 30 MIN: CPT | Performed by: PHYSICIAN ASSISTANT

## 2018-07-04 PROCEDURE — 99000 SPECIMEN HANDLING OFFICE-LAB: CPT | Performed by: PHYSICIAN ASSISTANT

## 2018-07-04 PROCEDURE — 87186 SC STD MICRODIL/AGAR DIL: CPT

## 2018-07-04 PROCEDURE — 81025 URINE PREGNANCY TEST: CPT | Performed by: PHYSICIAN ASSISTANT

## 2018-07-04 RX ORDER — PHENAZOPYRIDINE HYDROCHLORIDE 200 MG/1
200 TABLET, FILM COATED ORAL 3 TIMES DAILY
Qty: 6 TAB | Refills: 0 | Status: SHIPPED | OUTPATIENT
Start: 2018-07-04 | End: 2018-07-06

## 2018-07-04 RX ORDER — NITROFURANTOIN 25; 75 MG/1; MG/1
100 CAPSULE ORAL EVERY 12 HOURS
Qty: 10 CAP | Refills: 0 | Status: SHIPPED | OUTPATIENT
Start: 2018-07-04 | End: 2018-07-09

## 2018-07-04 NOTE — PROGRESS NOTES
Chief Complaint   Patient presents with   • Dysuria     back pain, abd pain, burning upon urination x 2days       HISTORY OF PRESENT ILLNESS: Patient is a 22 y.o. female who presents today for 2 days of worsening UTI symptoms.  She is having burning with urination as well as urgency and frequency.  She has lower abdominal discomfort and slightly aching lower back.   No flank pain.  No fevers, chills, nausea or vomiting.  She has increased water intake but has not been taking anything OTC for symptoms.    No GI complaints, no vaginal bleeding, LMP 2 weeks ago, is on OCPs.       Patient Active Problem List    Diagnosis Date Noted   • Encounter for initial prescription of contraceptive pills 11/15/2016   • Anxiety 04/22/2016       Allergies:Patient has no known allergies.    Current Outpatient Prescriptions Ordered in [facility]   Medication Sig Dispense Refill   • norethindrone-ethinyl estradiol (MICROGESTIN FE 1/20) 1-20 MG-MCG per tablet Take 1 Tab by mouth every day. 84 Tab 0   • escitalopram (LEXAPRO) 20 MG tablet Take 1 Tab by mouth every day. 90 Tab 3   • alprazolam (XANAX) 0.5 MG Tab Take 1 Tab by mouth 2 times a day as needed for Sleep or Anxiety. 30 Tab 1     No current Epic-ordered facility-administered medications on file.        Past Medical History:   Diagnosis Date   • Active medical problems: none        Social History   Substance Use Topics   • Smoking status: Never Smoker   • Smokeless tobacco: Never Used   • Alcohol use 0.0 oz/week      Comment: occ 1 every few months       Family Status   Relation Status   • Mother Alive   • Father Alive     Family History   Problem Relation Age of Onset   • Heart Disease Father      50       ROS:  Review of Systems   Constitutional: Negative for fever, chills, weight loss and malaise/fatigue.   HENT: Negative for ear pain, nosebleeds, congestion, sore throat and neck pain.    Eyes: Negative for blurred vision.   Respiratory: Negative for cough, sputum production,  "shortness of breath and wheezing.    Cardiovascular: Negative for chest pain, palpitations, orthopnea and leg swelling.   Gastrointestinal: Negative for heartburn, nausea, vomiting and abdominal pain.   Genitourinary: SEE HPI    Exam:  Blood pressure (!) 98/58, pulse 85, temperature 37.3 °C (99.1 °F), height 1.626 m (5' 4\"), weight 50.3 kg (111 lb), SpO2 95 %.  General:  Well nourished, well developed female in NAD  Eyes: PERRLA, EOM within normal limits, no conjunctival injection, no scleral icterus, visual fields and acuity grossly intact.  Nose: Symmetrical, no discharge.   Mouth: reasonable hygiene, no erythema exudates or tonsillar enlargement.  Neck: no masses, range of motion within normal limits, no tracheal deviation. No lymphadenopathy  Pulmonary: Normal respiratory effort, no wheezes, crackles, or rhonchi.  Cardiovascular: regular rate and rhythm without murmurs, rubs, or gallops.  Abdomen: Soft, mild suprapubic TTP, nondistended. Normal bowel sounds. No hepatosplenomegaly or masses, or hernias. No rebound or guarding.  No CVA tenderness  Skin: No visible rashes or lesion. Warm, pink, dry.   Extremities: no clubbing, cyanosis, or edema.  Neuro: A&O x 3. Speech normal/clear.  Normal gait.       Component Results     Component Value Ref Range & Units Status   POC Color drk yellow  Negative Final   POC Appearance clear  Negative Final   POC Leukocyte Esterase moderate  Negative Final   POC Nitrites negative  Negative Final   POC Urobiligen negative  Negative (0.2) mg/dL Final   POC Protein negative  Negative mg/dL Final   POC Urine PH 6.5  5.0 - 8.0 Final   POC Blood moderate  Negative Final   POC Specific Gravity 1.005  <1.005 - >1.030 Final   POC Ketones 40  Negative mg/dL Final   POC Bilirubin negative  Negative mg/dL Final   POC Glucose negative  Negative mg/dL Final       Assessment/Plan:  1. Acute cystitis with hematuria  POCT Urinalysis    POCT PREGNANCY    nitrofurantoin monohydr macro (MACROBID) 100 " MG Cap    phenazopyridine (PYRIDIUM) 200 MG Tab    Urine Culture       -POCT U/A as above.  Culture sent  -Fluids emphasized.  Void before/after intercourse.  Recommend abstain until treatment complete/symptoms resolved.   -signs and symptoms of worsening/ascending infection discussed and to seek prompt medical care should they arise.       Supportive care, differential diagnoses, and indications for immediate follow-up discussed with patient.   Pathogenesis of diagnosis discussed including typical length and natural progression.   Instructed to return to clinic or nearest emergency department for any change in condition, further concerns, or worsening of symptoms.  Patient states understanding of the plan of care and discharge instructions.      Michelle Martinez P.A.-C.

## 2018-07-06 LAB
BACTERIA UR CULT: ABNORMAL
BACTERIA UR CULT: ABNORMAL
SIGNIFICANT IND 70042: ABNORMAL
SITE SITE: ABNORMAL
SOURCE SOURCE: ABNORMAL

## 2018-07-17 DIAGNOSIS — Z30.011 ENCOUNTER FOR INITIAL PRESCRIPTION OF CONTRACEPTIVE PILLS: ICD-10-CM

## 2018-07-18 RX ORDER — NORETHINDRONE ACETATE AND ETHINYL ESTRADIOL 1MG-20(21)
1 KIT ORAL
Qty: 84 TAB | Refills: 0 | Status: SHIPPED | OUTPATIENT
Start: 2018-07-18 | End: 2019-12-11

## 2018-07-18 NOTE — TELEPHONE ENCOUNTER
From: Barb Hilton  Sent: 7/17/2018 9:44 PM PDT  Subject: Medication Renewal Request    Barb Hilton would like a refill of the following medications:     norethindrone-ethinyl estradiol (MICROGESTIN FE 1/20) 1-20 MG-MCG per tablet [Ramila Lopez M.D.]    Preferred pharmacy: [facility] [address] [place] [address] [id number] AT [facility] [address]    Comment:

## 2018-08-09 DIAGNOSIS — F41.9 ANXIETY: ICD-10-CM

## 2018-08-10 NOTE — TELEPHONE ENCOUNTER
Was the patient seen in the last year in this department? Yes    Does patient have an active prescription for medications requested? No     Received Request Via: Pharmacy      Pt met protocol?: No, OV 12/17

## 2018-08-11 RX ORDER — ESCITALOPRAM OXALATE 20 MG/1
TABLET ORAL
Qty: 90 TAB | Refills: 0 | Status: SHIPPED | OUTPATIENT
Start: 2018-08-11 | End: 2018-12-06 | Stop reason: SDUPTHER

## 2018-09-15 ENCOUNTER — DOCUMENTATION (OUTPATIENT)
Dept: OCCUPATIONAL MEDICINE | Facility: CLINIC | Age: 23
End: 2018-09-15

## 2018-09-20 ENCOUNTER — EH NON-PROVIDER (OUTPATIENT)
Dept: OCCUPATIONAL MEDICINE | Facility: CLINIC | Age: 23
End: 2018-09-20

## 2018-09-20 DIAGNOSIS — Z02.89 ENCOUNTER FOR OCCUPATIONAL HEALTH EXAMINATION INVOLVING RESPIRATOR: Primary | ICD-10-CM

## 2018-09-20 PROCEDURE — 94375 RESPIRATORY FLOW VOLUME LOOP: CPT | Performed by: PREVENTIVE MEDICINE

## 2018-10-12 ENCOUNTER — GYNECOLOGY VISIT (OUTPATIENT)
Dept: OBGYN | Facility: CLINIC | Age: 23
End: 2018-10-12
Payer: COMMERCIAL

## 2018-10-12 VITALS — SYSTOLIC BLOOD PRESSURE: 116 MMHG | DIASTOLIC BLOOD PRESSURE: 72 MMHG | BODY MASS INDEX: 19.84 KG/M2 | WEIGHT: 115.6 LBS

## 2018-10-12 DIAGNOSIS — Z30.430 ENCOUNTER FOR IUD INSERTION: ICD-10-CM

## 2018-10-12 LAB
INT CON NEG: NEGATIVE
INT CON POS: POSITIVE
POC URINE PREGNANCY TEST: NEGATIVE

## 2018-10-12 PROCEDURE — 81025 URINE PREGNANCY TEST: CPT | Performed by: OBSTETRICS & GYNECOLOGY

## 2018-10-12 PROCEDURE — 58300 INSERT INTRAUTERINE DEVICE: CPT | Performed by: OBSTETRICS & GYNECOLOGY

## 2018-10-12 RX ORDER — IBUPROFEN 200 MG
600 TABLET ORAL ONCE
OUTPATIENT
Start: 2018-10-12 | End: 2018-10-13

## 2018-10-12 RX ORDER — IBUPROFEN 200 MG
600 TABLET ORAL ONCE
Status: CANCELLED | OUTPATIENT
Start: 2018-10-12 | End: 2018-10-12

## 2018-10-12 NOTE — PROGRESS NOTES
23 y.o.    Female presents here today for her IUD insertion:     Patient's last menstrual period was 10/08/2018.      Today the patient is counseled on the risks of IUD insertion. I also discussed with the patient the risk of infection on insertion, and had asked the patient to remain on pelvic rest for one week following the insertion. We also discussed the risk of IUD expulsion, the risk of uterine perforation and IUD migration. If the IUD does migrate the patient may require a separate procedure such as a laparoscopy to retrieve the migrated IUD. I also discussed the 1% risk of pregnancy with IUD use. Also discussed with patient today increased risk of ectopic pregnancy with IUD use.  Also discussed today the possibility that IUD may need to be removed secondary to bleeding profile or pain. Also discussed were the possibility that partner can feel the IUD during intercourse. I also discussed the side effects of Mirena which can be amenorrhea or dysfunctional uterine bleeding or spotting.  Patient had the opportunity to ask questions regarding insertion, risks and benefits, all questions are answered in their entirety.  Informed consent is signed.    Procedure note  Urine pregnancy test is negative, informed consent was previously signed  A speculum was inserted into the vagina, the cervix was cleansed with Betadine swabs x3  Tenaculum was placed on the anterior lip of the cervix  The uterus was sounded to a depth of 6 centimeters  The IUD is placed under sterile conditions, Mirena IUD  The strings trimmed to approximately 3 cm  Tenaculum was removed from the cervix and hemostasis was noted  The patient tolerated the procedure well  Ibuprofen 600mg PO given to patient    Patient is asked to followup in 2 to 4 weeks for IUD check. The patient is asked to remain on pelvic rest for one week. She is asked to return to office sooner as needed for heavy vaginal bleeding, uncontrolled pain, fever, or any other  concerns.

## 2018-10-12 NOTE — NON-PROVIDER
Patient here for Mirena insertion  UPT negative  C/O: None  Pharm verified  600MG of ibuprofen given to pt orally per Dr. Greene after procedure  NDC: 2381-8863-13  LOT#: [id number]   Expiration Date: 2019     Dose: 600MG  Site: Other (see comments) Orally    Patient educated on use and side effects of medication. Name and  verified prior to injection. Pt tolerated?        Verified by Diana PAYTON    Administered by Argentina Lentz at 10:00AM.    Patient Provided Medication: no

## 2018-11-13 ENCOUNTER — GYNECOLOGY VISIT (OUTPATIENT)
Dept: OBGYN | Facility: CLINIC | Age: 23
End: 2018-11-13
Payer: COMMERCIAL

## 2018-11-13 VITALS — WEIGHT: 116 LBS | SYSTOLIC BLOOD PRESSURE: 112 MMHG | BODY MASS INDEX: 19.91 KG/M2 | DIASTOLIC BLOOD PRESSURE: 72 MMHG

## 2018-11-13 DIAGNOSIS — Z30.431 IUD CHECK UP: ICD-10-CM

## 2018-11-13 PROCEDURE — 99213 OFFICE O/P EST LOW 20 MIN: CPT | Performed by: OBSTETRICS & GYNECOLOGY

## 2018-11-13 NOTE — PROGRESS NOTES
Chief Complaint   Patient presents with   • Other     IUD check       History of present illness: 23 y.o. presents with above chief complaint. Pt had Mirena IUD placed without any complications and presents for an IUD check up. She reports no bleeding, no pain, no discomfort with intercourse, no discharge. Denies fever, chills, nausea, vomiting. Overall very happy with device.    Review of systems:  Pertinent positives documented in HPI and all other systems reviewed & are negative      All PMH, PSH, allergies, social history and FH reviewed and updated today:  Past Medical History:   Diagnosis Date   • Active medical problems: none        History reviewed. No pertinent surgical history.    Allergies: No Known Allergies    Social History     Social History   • Marital status: Single     Spouse name: N/A   • Number of children: N/A   • Years of education: N/A     Occupational History   •  [employer]   •       [employer] - nursing     Social History Main Topics   • Smoking status: Never Smoker   • Smokeless tobacco: Never Used   • Alcohol use 0.0 oz/week      Comment: occ 1 every few months   • Drug use: No   • Sexual activity: Yes     Partners: Male     Birth control/ protection: Condom, Pill     Other Topics Concern   • Not on file     Social History Narrative   • No narrative on file       Family History   Problem Relation Age of Onset   • Heart Disease Father         50       Physical exam:  Blood pressure 112/72, weight 52.6 kg (116 lb), not currently breastfeeding.    GENERAL APPEARANCE: healthy, alert, no distress, cooperative, smiling  NECK nontender, no masses, thyromegaly or nodules  ABDOMEN Abdomen soft, non-tender. BS normal. No masses,  No organomegaly  FEMALE GYN: normal female external genitalia without lesions, physiologic white vaginal discharge noted, vulva pink without erythema or friability, urethra is normal without discharge or scarring, no bladder fullness or masses, normal vagina and normal vaginal tone,  normal cervix, normal  uterus, size and consistency, IUD strings seen with normal length of strings, normal anus and perineum.  EXTREMITIES:negative clubbing, cyanosis, edema, No deformities, No skin discoloration    NEURO Awake, alert and oriented x 3, Normal gait, no sensory deficits  SKIN No rashes, or ulcers or lesions seen  PSYCHIATRIC: Patient shows appropriate affect, is alert and oriented x3, intact judgment and insight.      1. IUD check up       IUD in appropriate location    Spent  15 minutes in face-to-face patient contact in which greater than 50% of that visit was spent in counseling/coordination of care of IUD and normal menstrual irregularity side effects. Reminded patient to check for strings monthly and to call the office if IUD has fallen out or any other problems should arise. Also reminded patient IUD expires in 5 years.  Follow up yearly for annual exam or sooner as needed.

## 2018-12-06 ENCOUNTER — OFFICE VISIT (OUTPATIENT)
Dept: MEDICAL GROUP | Facility: PHYSICIAN GROUP | Age: 23
End: 2018-12-06
Payer: COMMERCIAL

## 2018-12-06 VITALS
HEIGHT: 64 IN | SYSTOLIC BLOOD PRESSURE: 100 MMHG | WEIGHT: 117 LBS | DIASTOLIC BLOOD PRESSURE: 62 MMHG | TEMPERATURE: 97.3 F | BODY MASS INDEX: 19.97 KG/M2 | HEART RATE: 74 BPM | OXYGEN SATURATION: 97 % | RESPIRATION RATE: 16 BRPM

## 2018-12-06 DIAGNOSIS — Z00.00 WELL ADULT EXAM: ICD-10-CM

## 2018-12-06 DIAGNOSIS — F41.9 ANXIETY: ICD-10-CM

## 2018-12-06 PROCEDURE — 99395 PREV VISIT EST AGE 18-39: CPT | Performed by: FAMILY MEDICINE

## 2018-12-06 RX ORDER — ESCITALOPRAM OXALATE 20 MG/1
20 TABLET ORAL
Qty: 90 TAB | Refills: 1 | Status: SHIPPED | OUTPATIENT
Start: 2018-12-06 | End: 2019-05-26 | Stop reason: SDUPTHER

## 2018-12-06 ASSESSMENT — PATIENT HEALTH QUESTIONNAIRE - PHQ9: CLINICAL INTERPRETATION OF PHQ2 SCORE: 0

## 2018-12-07 NOTE — PROGRESS NOTES
Chief Complaint   Patient presents with   • Annual Exam       HISTORY OF PRESENT ILLNESS: Patient is a 23 y.o. Female est patient who presents today to discuss the following issues:    1. Well adult exam  Here for annual preventive visit.  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  No new health concerns.  She reports anxiety is controlled on the lexapro.  She now has IUD.  She is engaged to be  this summer.  HM updated.           Active Ambulatory Problems     Diagnosis Date Noted   • Anxiety 04/22/2016     Resolved Ambulatory Problems     Diagnosis Date Noted   • Encounter for initial prescription of contraceptive pills 11/15/2016     Past Medical History:   Diagnosis Date   • Active medical problems: none    • IUD (intrauterine device) in place        Allergies:Patient has no known allergies.    Current Outpatient Prescriptions   Medication Sig Dispense Refill   • escitalopram (LEXAPRO) 20 MG tablet Take 1 Tab by mouth every day. 90 Tab 1   • levonorgestrel (MIRENA) 20 MCG/24HR IUD 1 Each by Intrauterine route Once.     • norethindrone-ethinyl estradiol (MICROGESTIN FE 1/20) 1-20 MG-MCG per tablet Take 1 Tab by mouth every day. 84 Tab 0   • alprazolam (XANAX) 0.5 MG Tab Take 1 Tab by mouth 2 times a day as needed for Sleep or Anxiety. 30 Tab 1     No current facility-administered medications for this visit.        Social History   Substance Use Topics   • Smoking status: Never Smoker   • Smokeless tobacco: Never Used   • Alcohol use 0.0 oz/week      Comment: occ 1 every few months       Family Status   Relation Status   • Mo Alive   • Fa Alive     Family History   Problem Relation Age of Onset   • Heart Disease Father         50     Health Maintenance Due   Topic Date Due   • IMM VARICELLA (CHICKENPOX) VACCINE (1 of 2 - 2-dose adolescent series) 08/04/2008       ROS:  Review of Systems   Constitutional: Negative for fever, chills, weight loss and malaise/fatigue.   HENT: Negative for  "ear pain, nosebleeds, congestion, sore throat and neck pain.    Eyes: Negative for blurred vision.   Respiratory: Negative for cough, sputum production, shortness of breath and wheezing.    Cardiovascular: Negative for chest pain, palpitations, orthopnea and leg swelling.   Gastrointestinal: Negative for heartburn, nausea, vomiting and abdominal pain.   Genitourinary: Negative for dysuria, urgency and frequency.   Musculoskeletal: Negative for myalgias, back pain and joint pain.   Skin: Negative for rash and itching.   Neurological: Negative for dizziness, tingling, tremors, sensory change, focal weakness and headaches.   Endo/Heme/Allergies: Does not bruise/bleed easily.   Psychiatric/Behavioral: Negative for depression, suicidal ideas and memory loss.  The patient is not nervous/anxious and does not have insomnia.      Exam:  Blood pressure 100/62, pulse 74, temperature 36.3 °C (97.3 °F), resp. rate 16, height 1.626 m (5' 4\"), weight 53.1 kg (117 lb), SpO2 97 %, not currently breastfeeding.  General:  Well nourished, well developed female in NAD  HEENT: Normocephalic and atraumatic. TMs clear bilaterally. Oropharynx is clear      without erythema and no tonsillar exudate. Nasal mucosa pink with minimal      Rhinorrhea.  EYES: EOMI.  Conjunctiva clear.    Neck: Supple without JVD or bruit. Thyroid is not enlarged.  Pulmonary: Clear to ausculation and percussion.  Normal effort. No rales, ronchi, or wheezing.  Cardiovascular: Regular rate and rhythm without murmur, no peripheral edema  Abdomen: positive bowel sounds.  Non tender, non distended, no hepatosplenomegaly.   MSK: normal ROM in upper and lower extremities bilaterally  Psych: appropriate affect and concentration, oriented to person and place  Neuro: no unilateral weakness in upper or lower extremities.  No gait disturbance    Please note that this dictation was created using voice recognition software. I have made every reasonable attempt to correct obvious " errors, but I expect that there are errors of grammar and possibly content that I did not discover before finalizing the note.    Assessment/Plan:  1. Well adult exam  Continue healthy lifestyle.     2. Anxiety  Continue   - escitalopram (LEXAPRO) 20 MG tablet; Take 1 Tab by mouth every day.  Dispense: 90 Tab; Refill: 1      1 year follow up, sooner prn .

## 2019-01-31 ENCOUNTER — HOSPITAL ENCOUNTER (OUTPATIENT)
Dept: LAB | Facility: MEDICAL CENTER | Age: 24
End: 2019-01-31
Payer: COMMERCIAL

## 2019-01-31 LAB
BDY FAT % MEASURED: 20.5 %
BP DIAS: 64 MMHG
BP SYS: 116 MMHG
CHOLEST SERPL-MCNC: 154 MG/DL (ref 100–199)
DIABETES HTDIA: NO
EVENT NAME HTEVT: NORMAL
FASTING HTFAS: YES
GLUCOSE SERPL-MCNC: 85 MG/DL (ref 65–99)
HDLC SERPL-MCNC: 50 MG/DL
HYPERTENSION HTHYP: NO
LDLC SERPL CALC-MCNC: 95 MG/DL
SCREENING LOC CITY HTCIT: NORMAL
SCREENING LOC STATE HTSTA: NORMAL
SCREENING LOCATION HTLOC: NORMAL
SMOKING HTSMO: NO
SUBSCRIBER ID HTSID: NORMAL
TRIGL SERPL-MCNC: 44 MG/DL (ref 0–149)

## 2019-01-31 PROCEDURE — S5190 WELLNESS ASSESSMENT BY NONPH: HCPCS

## 2019-01-31 PROCEDURE — 80061 LIPID PANEL: CPT

## 2019-01-31 PROCEDURE — 82947 ASSAY GLUCOSE BLOOD QUANT: CPT

## 2019-01-31 PROCEDURE — 36415 COLL VENOUS BLD VENIPUNCTURE: CPT

## 2019-02-16 ENCOUNTER — OFFICE VISIT (OUTPATIENT)
Dept: URGENT CARE | Facility: CLINIC | Age: 24
End: 2019-02-16
Payer: COMMERCIAL

## 2019-02-16 VITALS
HEART RATE: 75 BPM | BODY MASS INDEX: 19.29 KG/M2 | DIASTOLIC BLOOD PRESSURE: 70 MMHG | HEIGHT: 64 IN | OXYGEN SATURATION: 95 % | TEMPERATURE: 97.6 F | SYSTOLIC BLOOD PRESSURE: 100 MMHG | WEIGHT: 113 LBS | RESPIRATION RATE: 17 BRPM

## 2019-02-16 DIAGNOSIS — K52.9 GASTROENTERITIS: Primary | ICD-10-CM

## 2019-02-16 DIAGNOSIS — R52 BODY ACHES: ICD-10-CM

## 2019-02-16 LAB
FLUAV+FLUBV AG SPEC QL IA: NEGATIVE
INT CON NEG: NEGATIVE
INT CON POS: POSITIVE

## 2019-02-16 PROCEDURE — 99214 OFFICE O/P EST MOD 30 MIN: CPT | Performed by: NURSE PRACTITIONER

## 2019-02-16 PROCEDURE — 87804 INFLUENZA ASSAY W/OPTIC: CPT | Performed by: NURSE PRACTITIONER

## 2019-02-16 ASSESSMENT — ENCOUNTER SYMPTOMS
DIZZINESS: 0
ABDOMINAL PAIN: 0
COUGH: 0
NAUSEA: 0
SORE THROAT: 0
SHORTNESS OF BREATH: 0
VOMITING: 0
WEAKNESS: 0
HEADACHES: 0
DIARRHEA: 0
EYE DISCHARGE: 0
SINUS PAIN: 0
EYE REDNESS: 0
CONSTIPATION: 0

## 2019-02-16 ASSESSMENT — LIFESTYLE VARIABLES: SUBSTANCE_ABUSE: 0

## 2019-02-16 NOTE — LETTER
February 16, 2019       Patient: Barb Hilton   YOB: 1995   Date of Visit: 2/16/2019         To Whom It May Concern:    It is my medical opinion that Barb Hilton remain out of work until 02/18/19 due to a medical illness.              Sincerely,          DANE MaganaPJONH  Electronically Signed

## 2019-02-16 NOTE — PROGRESS NOTES
"Subjective:      Barb Hilton is a 23 y.o. female who presents with Diarrhea (fever, chills, just completed round of antibiotics for teeth, yesterday symptoms started)        Denies past medical, surgical or family history that is significant to today's problem.   RX or OTC medications-- reviewed with patient today.   No Known Allergies      HPI this is a new problem.  There is a 23-year-old female who presents with sudden onset of diarrhea, body aches, fever and chills over the past 24 hours.  She has had 5 loose stools yesterday and only one today she is just completed a round of antibiotics for dental procedure. She she has been immunized against influenza.  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  She has not eaten any new foods or at any new restaurants.  No other household members are ill.  No recent travel.  She denies abdominal pain nausea, vomiting.  No other aggravating or alleviating factors at this time.    Review of Systems   Constitutional: Negative for malaise/fatigue.   HENT: Positive for congestion (Mild nasal congestion). Negative for hearing loss, sinus pain and sore throat.    Eyes: Negative for discharge and redness.   Respiratory: Negative for cough and shortness of breath.    Gastrointestinal: Negative for abdominal pain, constipation, diarrhea, nausea and vomiting.   Neurological: Negative for dizziness, weakness and headaches.   Psychiatric/Behavioral: Negative for substance abuse.          Objective:     /70 (BP Location: Left arm, Patient Position: Sitting, BP Cuff Size: Adult)   Pulse 75   Temp 36.4 °C (97.6 °F) (Temporal)   Resp 17   Ht 1.626 m (5' 4\")   Wt 51.3 kg (113 lb)   SpO2 95%   Breastfeeding? No   BMI 19.40 kg/m²      Physical Exam   Constitutional: She is oriented to person, place, and time. She appears well-developed and well-nourished.   HENT:   Head: Normocephalic.   Right Ear: External ear normal.   Left Ear: " External ear normal.   Nose: Nose normal.   Mouth/Throat: Oropharynx is clear and moist. No oropharyngeal exudate.   Eyes: Pupils are equal, round, and reactive to light.   Neck: Normal range of motion.   Cardiovascular: Normal rate and regular rhythm.    Pulmonary/Chest: Effort normal and breath sounds normal.   Abdominal: Normal appearance and bowel sounds are normal. There is generalized tenderness. There is no rigidity, no rebound, no guarding and no CVA tenderness.   Neurological: She is alert and oriented to person, place, and time.   Skin: Skin is warm. Capillary refill takes less than 2 seconds. No rash noted.   Psychiatric: She has a normal mood and affect. Her speech is normal and behavior is normal. Judgment and thought content normal.   Nursing note and vitals reviewed.         .POCT influenza: negative        Assessment/Plan:     1. Gastroenteritis     2. Body aches  POCT Influenza A/B     BRAT type diet   Add yogurt or probiotic to diet   OTC imodium AD if > 5 stools a day or diarrhea interferes with ADL's. Dosage and directions per   Keep well hydrated  Discussed that I felt this was viral in nature. Did not see any evidence of a bacterial process. Discussed natural progression and sx care.  Return to clinic or PCP prn  if current symptoms are not resolving in a satisfactory manner or sooner if new or worsening symptoms occur.   Patient was advised of signs and symptoms which would warrant further evaluation and /or emergent evaluation in ER.  Verbalized agreement with this treatment plan and seemed to understand without barriers. Questions were encouraged and answered to patients satisfaction.

## 2019-02-16 NOTE — PATIENT INSTRUCTIONS
Viral Gastroenteritis, Adult  Viral gastroenteritis is also known as the stomach flu. This condition is caused by various viruses. These viruses can be passed from person to person very easily (are very contagious). This condition may affect your stomach, small intestine, and large intestine. It can cause sudden watery diarrhea, fever, and vomiting.  Diarrhea and vomiting can make you feel weak and cause you to become dehydrated. You may not be able to keep fluids down. Dehydration can make you tired and thirsty, cause you to have a dry mouth, and decrease how often you urinate. Older adults and people with other diseases or a weak immune system are at higher risk for dehydration.  It is important to replace the fluids that you lose from diarrhea and vomiting. If you become severely dehydrated, you may need to get fluids through an IV tube.  What are the causes?  Gastroenteritis is caused by various viruses, including rotavirus and norovirus. Norovirus is the most common cause in adults.  You can get sick by eating food, drinking water, or touching a surface contaminated with one of these viruses. You can also get sick from sharing utensils or other personal items with an infected person.  What increases the risk?  This condition is more likely to develop in people:  · Who have a weak defense system (immune system).  · Who live with one or more children who are younger than 2 years old.  · Who live in a nursing home.  · Who go on cruise ships.  What are the signs or symptoms?  Symptoms of this condition start suddenly 1-2 days after exposure to a virus. Symptoms may last a few days or as long as a week. The most common symptoms are watery diarrhea and vomiting. Other symptoms include:  · Fever.  · Headache.  · Fatigue.  · Pain in the abdomen.  · Chills.  · Weakness.  · Nausea.  · Muscle aches.  · Loss of appetite.  How is this diagnosed?  This condition is diagnosed with a medical history and physical exam. You may  also have a stool test to check for viruses or other infections.  How is this treated?  This condition typically goes away on its own. The focus of treatment is to restore lost fluids (rehydration). Your health care provider may recommend that you take an oral rehydration solution (ORS) to replace important salts and minerals (electrolytes) in your body. Severe cases of this condition may require giving fluids through an IV tube.  Treatment may also include medicine to help with your symptoms.  Follow these instructions at home:  Follow instructions from your health care provider about how to care for yourself at home.  Eating and drinking  Follow these recommendations as told by your health care provider:  · Take an ORS. This is a drink that is sold at pharmacies and retail stores.  · Drink clear fluids in small amounts as you are able. Clear fluids include water, ice chips, diluted fruit juice, and low-calorie sports drinks.  · Eat bland, easy-to-digest foods in small amounts as you are able. These foods include bananas, applesauce, rice, lean meats, toast, and crackers.  · Avoid fluids that contain a lot of sugar or caffeine, such as energy drinks, sports drinks, and soda.  · Avoid alcohol.  · Avoid spicy or fatty foods.  General instructions  · Drink enough fluid to keep your urine clear or pale yellow.  · Wash your hands often. If soap and water are not available, use hand .  · Make sure that all people in your household wash their hands well and often.  · Take over-the-counter and prescription medicines only as told by your health care provider.  · Rest at home while you recover.  · Watch your condition for any changes.  · Take a warm bath to relieve any burning or pain from frequent diarrhea episodes.  · Keep all follow-up visits as told by your health care provider. This is important.  Contact a health care provider if:  · You cannot keep fluids down.  · Your symptoms get worse.  · You have new  symptoms.  · You feel light-headed or dizzy.  · You have muscle cramps.  Get help right away if:  · You have chest pain.  · You feel extremely weak or you faint.  · You see blood in your vomit.  · Your vomit looks like coffee grounds.  · You have bloody or black stools or stools that look like tar.  · You have a severe headache, a stiff neck, or both.  · You have a rash.  · You have severe pain, cramping, or bloating in your abdomen.  · You have trouble breathing or you are breathing very quickly.  · Your heart is beating very quickly.  · Your skin feels cold and clammy.  · You feel confused.  · You have pain when you urinate.  · You have signs of dehydration, such as:  ¨ Dark urine, very little urine, or no urine.  ¨ Cracked lips.  ¨ Dry mouth.  ¨ Sunken eyes.  ¨ Sleepiness.  ¨ Weakness.  This information is not intended to replace advice given to you by your health care provider. Make sure you discuss any questions you have with your health care provider.  Document Released: 12/18/2006 Document Revised: 05/31/2017 Document Reviewed: 08/23/2016  [employer] Interactive Patient Education © 2017 [employer]

## 2019-03-04 ENCOUNTER — HOSPITAL ENCOUNTER (OUTPATIENT)
Facility: MEDICAL CENTER | Age: 24
End: 2019-03-04
Attending: NURSE PRACTITIONER
Payer: COMMERCIAL

## 2019-03-04 ENCOUNTER — OFFICE VISIT (OUTPATIENT)
Dept: URGENT CARE | Facility: CLINIC | Age: 24
End: 2019-03-04
Payer: COMMERCIAL

## 2019-03-04 VITALS
RESPIRATION RATE: 16 BRPM | SYSTOLIC BLOOD PRESSURE: 118 MMHG | OXYGEN SATURATION: 95 % | WEIGHT: 115 LBS | TEMPERATURE: 97.2 F | HEART RATE: 100 BPM | DIASTOLIC BLOOD PRESSURE: 80 MMHG | BODY MASS INDEX: 19.74 KG/M2

## 2019-03-04 DIAGNOSIS — N30.01 ACUTE CYSTITIS WITH HEMATURIA: ICD-10-CM

## 2019-03-04 DIAGNOSIS — J06.9 VIRAL UPPER RESPIRATORY TRACT INFECTION WITH COUGH: ICD-10-CM

## 2019-03-04 DIAGNOSIS — R30.0 DYSURIA: ICD-10-CM

## 2019-03-04 DIAGNOSIS — N30.01 ACUTE CYSTITIS WITH HEMATURIA: Primary | ICD-10-CM

## 2019-03-04 LAB
APPEARANCE UR: NORMAL
BILIRUB UR STRIP-MCNC: NORMAL MG/DL
COLOR UR AUTO: YELLOW
GLUCOSE UR STRIP.AUTO-MCNC: NORMAL MG/DL
KETONES UR STRIP.AUTO-MCNC: NORMAL MG/DL
LEUKOCYTE ESTERASE UR QL STRIP.AUTO: NORMAL
NITRITE UR QL STRIP.AUTO: NORMAL
PH UR STRIP.AUTO: 7 [PH] (ref 5–8)
PROT UR QL STRIP: NORMAL MG/DL
RBC UR QL AUTO: NORMAL
SP GR UR STRIP.AUTO: 1.01
UROBILINOGEN UR STRIP-MCNC: 0.2 MG/DL

## 2019-03-04 PROCEDURE — 99000 SPECIMEN HANDLING OFFICE-LAB: CPT | Performed by: NURSE PRACTITIONER

## 2019-03-04 PROCEDURE — 81002 URINALYSIS NONAUTO W/O SCOPE: CPT | Performed by: NURSE PRACTITIONER

## 2019-03-04 PROCEDURE — 87086 URINE CULTURE/COLONY COUNT: CPT

## 2019-03-04 PROCEDURE — 99214 OFFICE O/P EST MOD 30 MIN: CPT | Performed by: NURSE PRACTITIONER

## 2019-03-04 RX ORDER — CEPHALEXIN 500 MG/1
500 CAPSULE ORAL 2 TIMES DAILY
Qty: 14 CAP | Refills: 0 | Status: SHIPPED | OUTPATIENT
Start: 2019-03-04 | End: 2019-03-11

## 2019-03-04 ASSESSMENT — ENCOUNTER SYMPTOMS
GASTROINTESTINAL NEGATIVE: 1
MUSCULOSKELETAL NEGATIVE: 1
CONSTITUTIONAL NEGATIVE: 1
PSYCHIATRIC NEGATIVE: 1
RHINORRHEA: 1
VOMITING: 0
SORE THROAT: 1
EYES NEGATIVE: 1
FLANK PAIN: 0
CARDIOVASCULAR NEGATIVE: 1
BACK PAIN: 0
NEUROLOGICAL NEGATIVE: 1
ABDOMINAL PAIN: 0
FEVER: 0
COUGH: 1
NAUSEA: 0
SHORTNESS OF BREATH: 0

## 2019-03-04 NOTE — LETTER
March 4, 2019         Patient: Barb Hilton   YOB: 1995   Date of Visit: 3/4/2019           To Whom it May Concern:    Barb Hilton was seen in my clinic on 3/4/2019. She may return to work 3/7/2019 or sooner if she is feeling better.    If you have any questions or concerns, please don't hesitate to call.        Sincerely,           ROBB Ricks.  Electronically Signed

## 2019-03-04 NOTE — PROGRESS NOTES
Subjective:     Barb Hilton is a 23 y.o. female who presents for Dysuria (x today, pain with urination, urinary frequency); Sinus Problem (x 2-3 days, nasal cognestion, stuffy and runny nose); and Cough (x 2-3 days, some productive cough)       Dysuria    This is a new problem. The current episode started today. The problem has been gradually worsening. The quality of the pain is described as burning. The pain is mild. There has been no fever. Associated symptoms include frequency. Pertinent negatives include no flank pain, nausea or vomiting. Associated symptoms comments: +Suprapubic pain. Hx of past UTIs   Cough   This is a new problem. Episode onset: 2-3 days ago. The problem has been gradually worsening. Associated symptoms include ear congestion, nasal congestion, rhinorrhea and a sore throat. Pertinent negatives include no ear pain (congestion), fever or shortness of breath. There is no history of asthma. Hx of past UTIs   Pt has had her flu shot this season. She works as a nurse.    PMH:  has a past medical history of Active medical problems: none and IUD (intrauterine device) in place.    MEDS:   Current Outpatient Prescriptions:   •  cephALEXin (KEFLEX) 500 MG Cap, Take 1 Cap by mouth 2 times a day for 7 days., Disp: 14 Cap, Rfl: 0  •  escitalopram (LEXAPRO) 20 MG tablet, Take 1 Tab by mouth every day., Disp: 90 Tab, Rfl: 1  •  levonorgestrel (MIRENA) 20 MCG/24HR IUD, 1 Each by Intrauterine route Once., Disp: , Rfl:   •  alprazolam (XANAX) 0.5 MG Tab, Take 1 Tab by mouth 2 times a day as needed for Sleep or Anxiety., Disp: 30 Tab, Rfl: 1  •  norethindrone-ethinyl estradiol (MICROGESTIN FE 1/20) 1-20 MG-MCG per tablet, Take 1 Tab by mouth every day., Disp: 84 Tab, Rfl: 0    ALLERGIES: No Known Allergies    SURGHX: No past surgical history on file.    SOCHX:  reports that she has never smoked. She has never used smokeless tobacco. She reports that she drinks alcohol. She reports that she does not  use drugs.     FH: Reviewed with patient, not pertinent to this visit.     Review of Systems   Constitutional: Negative.  Negative for fever and malaise/fatigue.   HENT: Positive for congestion, rhinorrhea and sore throat. Negative for ear pain (congestion).    Eyes: Negative.    Respiratory: Positive for cough. Negative for shortness of breath.    Cardiovascular: Negative.    Gastrointestinal: Negative.  Negative for abdominal pain, nausea and vomiting.   Genitourinary: Positive for dysuria and frequency. Negative for flank pain.   Musculoskeletal: Negative.  Negative for back pain.   Skin: Negative.    Neurological: Negative.    Psychiatric/Behavioral: Negative.    All other systems reviewed and are negative.    Objective:     /80 (BP Location: Left arm, Patient Position: Sitting, BP Cuff Size: Adult)   Pulse 100   Temp 36.2 °C (97.2 °F) (Temporal)   Resp 16   Wt 52.2 kg (115 lb)   SpO2 95%   BMI 19.74 kg/m²     Physical Exam   Constitutional: She is oriented to person, place, and time. She appears well-developed and well-nourished. She is cooperative.  Non-toxic appearance. No distress.   HENT:   Head: Normocephalic and atraumatic.   Right Ear: Tympanic membrane and external ear normal.   Left Ear: Tympanic membrane and external ear normal.   Nose: Mucosal edema and rhinorrhea present.   Mouth/Throat: Uvula is midline, oropharynx is clear and moist and mucous membranes are normal.   Eyes: Pupils are equal, round, and reactive to light. Conjunctivae and EOM are normal.   Neck: Normal range of motion.   Cardiovascular: Normal rate, regular rhythm, normal heart sounds and normal pulses.    Pulmonary/Chest: Effort normal and breath sounds normal. No respiratory distress. She has no decreased breath sounds.   Abdominal: Soft. Bowel sounds are normal. There is no tenderness. There is no CVA tenderness.   Musculoskeletal: Normal range of motion. She exhibits no deformity.   Lymphadenopathy:     She has no  cervical adenopathy.   Neurological: She is alert and oriented to person, place, and time. She has normal strength. No sensory deficit.   Skin: Skin is warm, dry and intact. Capillary refill takes less than 2 seconds.   Psychiatric: She has a normal mood and affect. Her behavior is normal.   Vitals reviewed.    UA:    Component Results     Component Value Ref Range & Units Status   POC Color yellow  Negative Final   POC Appearance cloudy  Negative Final   POC Leukocyte Esterase mod  Negative Final   POC Nitrites neg  Negative Final   POC Urobiligen 0.2  Negative (0.2) mg/dL Final   POC Protein neg  Negative mg/dL Final   POC Urine PH 7.0  5.0 - 8.0 Final   POC Blood small  Negative Final   POC Specific Gravity 1.010  <1.005 - >1.030 Final   POC Ketones neg  Negative mg/dL Final   POC Bilirubin neg  Negative mg/dL Final   POC Glucose neg  Negative mg/dL Final        Assessment/Plan:     1. Acute cystitis with hematuria  - URINE CULTURE(NEW); Future  - cephALEXin (KEFLEX) 500 MG Cap; Take 1 Cap by mouth 2 times a day for 7 days.  Dispense: 14 Cap; Refill: 0    2. Viral upper respiratory tract infection with cough    3. Dysuria  - POCT Urinalysis    UA with moderate LE and small blood. Urine culture pending. Pt reports history of previous UTIs and symptoms feel similar. Rx sent electronically for Keflex. Advised to increase fluids.    Discussed likely viral etiology of cough and congestion and expected course and duration of illness. Discussed supportive measures including increasing fluids and rest as well as OTC symptom management including acetaminophen and/or ibuprofen PRN pain and/or fever.    Patient advised to: Return in about 3 days (around 3/7/2019) for 1) Symptoms don't improve or worsen, or go to ER, 2) Follow up with primary care in 7-10 days.    Differential diagnosis, natural history, supportive care, and indications for immediate follow-up discussed. All questions answered. Patient agrees with the plan  of care.

## 2019-03-07 LAB
BACTERIA UR CULT: NORMAL
SIGNIFICANT IND 70042: NORMAL
SITE SITE: NORMAL
SOURCE SOURCE: NORMAL

## 2019-04-29 ENCOUNTER — OFFICE VISIT (OUTPATIENT)
Dept: URGENT CARE | Facility: CLINIC | Age: 24
End: 2019-04-29
Payer: COMMERCIAL

## 2019-04-29 VITALS
BODY MASS INDEX: 20.14 KG/M2 | OXYGEN SATURATION: 96 % | TEMPERATURE: 98 F | HEART RATE: 88 BPM | HEIGHT: 64 IN | RESPIRATION RATE: 16 BRPM | WEIGHT: 118 LBS | SYSTOLIC BLOOD PRESSURE: 120 MMHG | DIASTOLIC BLOOD PRESSURE: 80 MMHG

## 2019-04-29 DIAGNOSIS — J30.9 ALLERGIC SINUSITIS: ICD-10-CM

## 2019-04-29 PROCEDURE — 99214 OFFICE O/P EST MOD 30 MIN: CPT | Performed by: PHYSICIAN ASSISTANT

## 2019-04-29 RX ORDER — METHYLPREDNISOLONE 4 MG/1
TABLET ORAL
Qty: 21 TAB | Refills: 0 | Status: SHIPPED | OUTPATIENT
Start: 2019-04-29 | End: 2019-12-11

## 2019-04-29 NOTE — PROGRESS NOTES
"Chief Complaint   Patient presents with   • Cough     x 1 wk, dry cough   • Sinus Problem     x 2 days, nasal congestion, stuffy nose and runny nose, post nasal drip and scrachy throat       HISTORY OF PRESENT ILLNESS: Patient is a 23 y.o. female who presents today for 1 week of worsening cough.  Chest tightness/burning when she was coughing and this started about 4 days into.   She has not had any fevers, chills.  She states \" I feel fine, not sick overall\" The last few days she has had runny nose/nasal congestion.  She has taken Claritin, Sudafed, Mucinex, Nasacort but ultimately no relief.  Suffers from allergies to rabbit brush year round.    No hx of asthma.   She denies SOB. No chest pain.       Patient Active Problem List    Diagnosis Date Noted   • Anxiety 04/22/2016       Allergies:Patient has no known allergies.    Current Outpatient Prescriptions Ordered in [facility]   Medication Sig Dispense Refill   • escitalopram (LEXAPRO) 20 MG tablet Take 1 Tab by mouth every day. 90 Tab 1   • levonorgestrel (MIRENA) 20 MCG/24HR IUD 1 Each by Intrauterine route Once.     • alprazolam (XANAX) 0.5 MG Tab Take 1 Tab by mouth 2 times a day as needed for Sleep or Anxiety. 30 Tab 1   • norethindrone-ethinyl estradiol (MICROGESTIN FE 1/20) 1-20 MG-MCG per tablet Take 1 Tab by mouth every day. 84 Tab 0     No current Epic-ordered facility-administered medications on file.        Past Medical History:   Diagnosis Date   • Active medical problems: none    • IUD (intrauterine device) in place        Social History   Substance Use Topics   • Smoking status: Never Smoker   • Smokeless tobacco: Never Used   • Alcohol use 0.0 oz/week      Comment: occ 1 every few months       Family Status   Relation Status   • Mo Alive   • Fa Alive     Family History   Problem Relation Age of Onset   • Heart Disease Father         50       ROS:  Review of Systems   Constitutional: SEE HPI  HENT: SEE HPI  Eyes: Negative for blurred vision. " "  Respiratory: SEE HPI  Cardiovascular: Negative for chest pain, palpitations, orthopnea and leg swelling.   Gastrointestinal: Negative for heartburn, nausea, vomiting and abdominal pain.       Exam:  /80 (BP Location: Left arm, Patient Position: Sitting, BP Cuff Size: Adult)   Pulse 88   Temp 36.7 °C (98 °F) (Temporal)   Resp 16   Ht 1.626 m (5' 4\")   Wt 53.5 kg (118 lb)   SpO2 96%   General:  Well nourished, well developed female in NAD  Eyes: PERRLA, EOM within normal limits, no conjunctival injection, no scleral icterus, visual fields and acuity grossly intact.  Ears: Normal shape and symmetry, no tenderness, no discharge. External canals are without any significant edema or erythema. Tympanic membranes are without any inflammation, no effusion. Gross auditory acuity is intact  Nose: Symmetrical, sinuses without tenderness, clear rhinorrhea, pale boggy turbinates.   Mouth: reasonable hygiene, no erythema exudates or tonsillar enlargement.  Neck: no masses, range of motion within normal limits, no tracheal deviation. No lymphadenopathy  Pulmonary: Normal respiratory effort, no wheezes, crackles, or rhonchi.  Cardiovascular: regular rate and rhythm without murmurs, rubs, or gallops.  Skin: No visible rashes or lesion. Warm, pink, dry.   Extremities: no clubbing, cyanosis, or edema.  Neuro: A&O x 3. Speech normal/clear.  Normal gait.         Assessment/Plan:  1. Allergic sinusitis  MethylPREDNISolone (MEDROL DOSEPAK) 4 MG Tablet Therapy Pack    REFERRAL TO ALLERGY       -consistent with significant allergy symptoms.  Lungs CTA, afebrile. No concern at this time for acute bacterial infectious process.   -Medrol dose pack, continue antihistamine, saline, keep windows and doors closed  -allergy referral for follow up      Supportive care, differential diagnoses, and indications for immediate follow-up discussed with patient.   Pathogenesis of diagnosis discussed including typical length and natural " progression.   Instructed to return to clinic or nearest emergency department for any change in condition, further concerns, or worsening of symptoms.  .      Michelle Martinez P.A.-C.

## 2019-05-26 DIAGNOSIS — F41.9 ANXIETY: ICD-10-CM

## 2019-05-28 RX ORDER — ESCITALOPRAM OXALATE 20 MG/1
TABLET ORAL
Qty: 90 TAB | Refills: 1 | Status: SHIPPED | OUTPATIENT
Start: 2019-05-28 | End: 2019-12-11 | Stop reason: SDUPTHER

## 2019-05-28 NOTE — TELEPHONE ENCOUNTER
Was the patient seen in the last year in this department? Yes    Does patient have an active prescription for medications requested? No     Received Request Via: Pharmacy    Pt met protocol?: Yes     Last OV 12/06/2018

## 2019-07-13 ENCOUNTER — OFFICE VISIT (OUTPATIENT)
Dept: URGENT CARE | Facility: CLINIC | Age: 24
End: 2019-07-13
Payer: COMMERCIAL

## 2019-07-13 VITALS
SYSTOLIC BLOOD PRESSURE: 120 MMHG | DIASTOLIC BLOOD PRESSURE: 80 MMHG | OXYGEN SATURATION: 98 % | BODY MASS INDEX: 21 KG/M2 | TEMPERATURE: 97.6 F | HEIGHT: 64 IN | WEIGHT: 123 LBS | HEART RATE: 72 BPM | RESPIRATION RATE: 16 BRPM

## 2019-07-13 DIAGNOSIS — R30.0 DYSURIA: ICD-10-CM

## 2019-07-13 DIAGNOSIS — N30.00 ACUTE CYSTITIS WITHOUT HEMATURIA: ICD-10-CM

## 2019-07-13 LAB
APPEARANCE UR: CLEAR
BILIRUB UR STRIP-MCNC: NORMAL MG/DL
COLOR UR AUTO: YELLOW
GLUCOSE UR STRIP.AUTO-MCNC: NORMAL MG/DL
INT CON NEG: NEGATIVE
INT CON POS: POSITIVE
KETONES UR STRIP.AUTO-MCNC: NORMAL MG/DL
LEUKOCYTE ESTERASE UR QL STRIP.AUTO: NORMAL
NITRITE UR QL STRIP.AUTO: NORMAL
PH UR STRIP.AUTO: 6 [PH] (ref 5–8)
POC URINE PREGNANCY TEST: NEGATIVE
PROT UR QL STRIP: NORMAL MG/DL
RBC UR QL AUTO: NORMAL
SP GR UR STRIP.AUTO: 1.01
UROBILINOGEN UR STRIP-MCNC: 0.2 MG/DL

## 2019-07-13 PROCEDURE — 99214 OFFICE O/P EST MOD 30 MIN: CPT | Performed by: PHYSICIAN ASSISTANT

## 2019-07-13 PROCEDURE — 81002 URINALYSIS NONAUTO W/O SCOPE: CPT | Performed by: PHYSICIAN ASSISTANT

## 2019-07-13 PROCEDURE — 81025 URINE PREGNANCY TEST: CPT | Performed by: PHYSICIAN ASSISTANT

## 2019-07-13 RX ORDER — NITROFURANTOIN 25; 75 MG/1; MG/1
100 CAPSULE ORAL EVERY 12 HOURS
Qty: 10 CAP | Refills: 0 | Status: SHIPPED | OUTPATIENT
Start: 2019-07-13 | End: 2019-07-18

## 2019-07-13 ASSESSMENT — ENCOUNTER SYMPTOMS
DIARRHEA: 0
CHILLS: 0
FEVER: 0
ABDOMINAL PAIN: 0
RESPIRATORY NEGATIVE: 1
VOMITING: 0
CARDIOVASCULAR NEGATIVE: 1
NAUSEA: 1
FLANK PAIN: 0

## 2019-07-14 NOTE — PROGRESS NOTES
Subjective:      Barb Hilton is a 23 y.o. female who presents with Urinary Frequency (x today, urinary frequency, burning with urination and lower abdominal pain)            Dysuria    This is a new problem. The current episode started today. The problem occurs every urination. The problem has been gradually worsening. The quality of the pain is described as burning. There has been no fever. The fever has been present for less than 1 day. There is no history of pyelonephritis. Associated symptoms include frequency, nausea and urgency. Pertinent negatives include no chills, flank pain, hematuria, hesitancy or vomiting. She has tried nothing for the symptoms. The treatment provided no relief. There is no history of kidney stones or recurrent UTIs.       PMH:  has a past medical history of Active medical problems: none and IUD (intrauterine device) in place.  MEDS:   Current Outpatient Prescriptions:   •  escitalopram (LEXAPRO) 20 MG tablet, TAKE 1 TABLET BY MOUTH EVERY DAY, Disp: 90 Tab, Rfl: 1  •  levonorgestrel (MIRENA) 20 MCG/24HR IUD, 1 Each by Intrauterine route Once., Disp: , Rfl:   •  alprazolam (XANAX) 0.5 MG Tab, Take 1 Tab by mouth 2 times a day as needed for Sleep or Anxiety., Disp: 30 Tab, Rfl: 1  •  MethylPREDNISolone (MEDROL DOSEPAK) 4 MG Tablet Therapy Pack, Take as directed, Disp: 21 Tab, Rfl: 0  •  norethindrone-ethinyl estradiol (MICROGESTIN FE 1/20) 1-20 MG-MCG per tablet, Take 1 Tab by mouth every day., Disp: 84 Tab, Rfl: 0  ALLERGIES: No Known Allergies  SURGHX: No past surgical history on file.  SOCHX:  reports that she has never smoked. She has never used smokeless tobacco. She reports that she drinks alcohol. She reports that she does not use drugs.  FH: family history includes Heart Disease in her father.    Review of Systems   Constitutional: Negative for chills and fever.   Respiratory: Negative.    Cardiovascular: Negative.    Gastrointestinal: Positive for nausea. Negative for  "abdominal pain, diarrhea and vomiting.   Genitourinary: Positive for dysuria, frequency and urgency. Negative for flank pain, hematuria and hesitancy.       Medications, Allergies, and current problem list reviewed today in Epic     Objective:     /80 (BP Location: Right arm, Patient Position: Sitting, BP Cuff Size: Adult)   Pulse 72   Temp 36.4 °C (97.6 °F) (Temporal)   Resp 16   Ht 1.626 m (5' 4\")   Wt 55.8 kg (123 lb)   SpO2 98%   BMI 21.11 kg/m²      Physical Exam   Constitutional: She is oriented to person, place, and time. She appears well-developed and well-nourished. No distress.   HENT:   Head: Normocephalic and atraumatic.   Eyes: Conjunctivae and EOM are normal.   Neck: Normal range of motion. Neck supple.   Cardiovascular: Normal rate, regular rhythm and normal heart sounds.    Pulmonary/Chest: Effort normal and breath sounds normal. No respiratory distress. She has no wheezes.   Abdominal: Soft. She exhibits no distension. There is tenderness (Suprapubic).   No flank pain or CVA tenderness   Neurological: She is alert and oriented to person, place, and time.   Skin: Skin is warm and dry. She is not diaphoretic.   Psychiatric: She has a normal mood and affect. Her behavior is normal. Judgment and thought content normal.   Nursing note and vitals reviewed.              Assessment/Plan:     1. Acute cystitis without hematuria  nitrofurantoin monohyd macro (MACROBID) 100 MG Cap   2. Dysuria  POCT Urinalysis    POCT PREGNANCY     Urinalysis: Leukocytes  Pregnancy: Negative    Take full course of antibiotic  Significantly increase fluids  OTC Motrin or Azo as needed  Cranberry as tolerated    Return to clinic or go to ED if symptoms worsen or persist. Indications for ED discussed at length. Patient voices understanding. Follow-up with your primary care provider in 3-5 days. Red flags discussed. All side effects of medication discussed including allergic response, GI upset, tendon injury, " etc.    Please note that this dictation was created using voice recognition software. I have made every reasonable attempt to correct obvious errors, but I expect that there are errors of grammar and possibly content that I did not discover before finalizing the note.

## 2019-08-10 ENCOUNTER — OFFICE VISIT (OUTPATIENT)
Dept: URGENT CARE | Facility: CLINIC | Age: 24
End: 2019-08-10
Payer: COMMERCIAL

## 2019-08-10 VITALS
RESPIRATION RATE: 18 BRPM | WEIGHT: 124 LBS | TEMPERATURE: 97.6 F | HEART RATE: 70 BPM | SYSTOLIC BLOOD PRESSURE: 124 MMHG | BODY MASS INDEX: 21.17 KG/M2 | HEIGHT: 64 IN | OXYGEN SATURATION: 96 % | DIASTOLIC BLOOD PRESSURE: 68 MMHG

## 2019-08-10 DIAGNOSIS — Z00.00 PHYSICAL EXAM: ICD-10-CM

## 2019-08-10 PROCEDURE — 99213 OFFICE O/P EST LOW 20 MIN: CPT | Performed by: PHYSICIAN ASSISTANT

## 2019-08-10 ASSESSMENT — ENCOUNTER SYMPTOMS
COUGH: 0
ABDOMINAL PAIN: 0
DIZZINESS: 0
NERVOUS/ANXIOUS: 0
DIARRHEA: 0
SHORTNESS OF BREATH: 0
SPUTUM PRODUCTION: 0
VOMITING: 0
WHEEZING: 0
FEVER: 0
HEMOPTYSIS: 0
CHILLS: 0
MUSCULOSKELETAL NEGATIVE: 1
INSOMNIA: 0
NAUSEA: 0

## 2019-08-10 NOTE — PROGRESS NOTES
"Subjective:      Barb Hilton is a 24 y.o. female who presents with Annual Exam            Annual Exam   This is a new problem. The current episode started today. The problem occurs constantly. The problem has been unchanged. Pertinent negatives include no abdominal pain, chest pain, chills, congestion, coughing, fever, nausea, rash or vomiting.     Patient presents to urgent care for routine physical exam for participation in scuba diving training. PMH is significant for anxiety/depression, currently taking Lexapro daily and xanax as needed. She reports taking xanax only about once a month.     Review of Systems   Constitutional: Negative for chills and fever.   HENT: Negative for congestion.    Respiratory: Negative for cough, hemoptysis, sputum production, shortness of breath and wheezing.    Cardiovascular: Negative for chest pain.   Gastrointestinal: Negative for abdominal pain, diarrhea, nausea and vomiting.   Genitourinary: Negative.    Musculoskeletal: Negative.    Skin: Negative for rash.   Neurological: Negative for dizziness.   Psychiatric/Behavioral: The patient is not nervous/anxious and does not have insomnia.         Objective:     /68   Pulse 70   Temp 36.4 °C (97.6 °F) (Temporal)   Resp 18   Ht 1.626 m (5' 4\")   Wt 56.2 kg (124 lb)   SpO2 96%   BMI 21.28 kg/m²      Physical Exam   Constitutional: She is oriented to person, place, and time. She appears well-developed and well-nourished. No distress.   HENT:   Head: Normocephalic and atraumatic.   Right Ear: Hearing, tympanic membrane, external ear and ear canal normal.   Left Ear: Hearing, tympanic membrane, external ear and ear canal normal.   Mouth/Throat: Oropharynx is clear and moist. No oropharyngeal exudate, posterior oropharyngeal edema or posterior oropharyngeal erythema.   Eyes: Pupils are equal, round, and reactive to light. Conjunctivae are normal. Right eye exhibits no discharge. Left eye exhibits no discharge. "   Neck: Normal range of motion.   Cardiovascular: Normal rate, regular rhythm and normal heart sounds.   No murmur heard.  Pulmonary/Chest: Effort normal and breath sounds normal. No stridor. No respiratory distress.   Musculoskeletal: Normal range of motion.   Neurological: She is alert and oriented to person, place, and time.   Skin: Skin is warm and dry. She is not diaphoretic.   Psychiatric: She has a normal mood and affect. Her behavior is normal.   Nursing note and vitals reviewed.            PMH:  has a past medical history of Active medical problems: none and IUD (intrauterine device) in place.  MEDS:   Current Outpatient Medications:   •  escitalopram (LEXAPRO) 20 MG tablet, TAKE 1 TABLET BY MOUTH EVERY DAY, Disp: 90 Tab, Rfl: 1  •  MethylPREDNISolone (MEDROL DOSEPAK) 4 MG Tablet Therapy Pack, Take as directed, Disp: 21 Tab, Rfl: 0  •  levonorgestrel (MIRENA) 20 MCG/24HR IUD, 1 Each by Intrauterine route Once., Disp: , Rfl:   •  norethindrone-ethinyl estradiol (MICROGESTIN FE 1/20) 1-20 MG-MCG per tablet, Take 1 Tab by mouth every day., Disp: 84 Tab, Rfl: 0  •  alprazolam (XANAX) 0.5 MG Tab, Take 1 Tab by mouth 2 times a day as needed for Sleep or Anxiety., Disp: 30 Tab, Rfl: 1  ALLERGIES: No Known Allergies  SURGHX: History reviewed. No pertinent surgical history.  SOCHX:  reports that she has never smoked. She has never used smokeless tobacco. She reports that she drinks alcohol. She reports that she does not use drugs.  FH: family history includes Heart Disease in her father.    Assessment/Plan:     1. Physical exam    No restrictions for participating in scuba diving, apart from avoidance of

## 2019-09-30 ENCOUNTER — DOCUMENTATION (OUTPATIENT)
Dept: OCCUPATIONAL MEDICINE | Facility: CLINIC | Age: 24
End: 2019-09-30

## 2019-09-30 ENCOUNTER — IMMUNIZATION (OUTPATIENT)
Dept: OCCUPATIONAL MEDICINE | Facility: CLINIC | Age: 24
End: 2019-09-30

## 2019-09-30 DIAGNOSIS — Z23 NEED FOR VACCINATION: ICD-10-CM

## 2019-09-30 PROCEDURE — 90686 IIV4 VACC NO PRSV 0.5 ML IM: CPT | Performed by: PREVENTIVE MEDICINE

## 2019-10-01 ENCOUNTER — EH NON-PROVIDER (OUTPATIENT)
Dept: OCCUPATIONAL MEDICINE | Facility: CLINIC | Age: 24
End: 2019-10-01

## 2019-10-01 DIAGNOSIS — Z02.89 ENCOUNTER FOR OCCUPATIONAL HEALTH EXAMINATION INVOLVING RESPIRATOR: Primary | ICD-10-CM

## 2019-10-01 PROCEDURE — 94375 RESPIRATORY FLOW VOLUME LOOP: CPT | Performed by: PREVENTIVE MEDICINE

## 2019-12-11 ENCOUNTER — OFFICE VISIT (OUTPATIENT)
Dept: MEDICAL GROUP | Facility: PHYSICIAN GROUP | Age: 24
End: 2019-12-11
Payer: COMMERCIAL

## 2019-12-11 VITALS
RESPIRATION RATE: 12 BRPM | DIASTOLIC BLOOD PRESSURE: 68 MMHG | BODY MASS INDEX: 22.02 KG/M2 | HEIGHT: 64 IN | WEIGHT: 129 LBS | HEART RATE: 70 BPM | SYSTOLIC BLOOD PRESSURE: 104 MMHG | TEMPERATURE: 98.1 F | OXYGEN SATURATION: 98 %

## 2019-12-11 DIAGNOSIS — F41.9 ANXIETY: ICD-10-CM

## 2019-12-11 DIAGNOSIS — Z00.00 WELL ADULT EXAM: ICD-10-CM

## 2019-12-11 PROCEDURE — 99395 PREV VISIT EST AGE 18-39: CPT | Performed by: FAMILY MEDICINE

## 2019-12-11 RX ORDER — ESCITALOPRAM OXALATE 20 MG/1
20 TABLET ORAL
Qty: 90 TAB | Refills: 1 | Status: SHIPPED | OUTPATIENT
Start: 2019-12-11 | End: 2020-03-19 | Stop reason: SDUPTHER

## 2019-12-11 RX ORDER — ALPRAZOLAM 0.5 MG/1
0.5 TABLET ORAL 2 TIMES DAILY PRN
Qty: 30 TAB | Refills: 1 | Status: SHIPPED
Start: 2019-12-11 | End: 2020-01-10

## 2019-12-11 ASSESSMENT — PATIENT HEALTH QUESTIONNAIRE - PHQ9: CLINICAL INTERPRETATION OF PHQ2 SCORE: 0

## 2019-12-11 NOTE — PROGRESS NOTES
Chief Complaint   Patient presents with   • Annual Exam       HISTORY OF PRESENT ILLNESS: Patient is a 24 y.o. female new patient who presents today to discuss the following issues:    1. Well adult exam  Here for well adult exam.  Reports good health to date.  Feels like the lexapro is working well for anxiety.  Does cause some diarrhea but tolerable.  Still needs xanax as needed for flights.        Active Ambulatory Problems     Diagnosis Date Noted   • Anxiety 04/22/2016     Resolved Ambulatory Problems     Diagnosis Date Noted   • Encounter for initial prescription of contraceptive pills 11/15/2016     Past Medical History:   Diagnosis Date   • Active medical problems: none    • IUD (intrauterine device) in place        Allergies:Patient has no known allergies.    Current Outpatient Medications   Medication Sig Dispense Refill   • escitalopram (LEXAPRO) 20 MG tablet Take 1 Tab by mouth every day. 90 Tab 1   • ALPRAZolam (XANAX) 0.5 MG Tab Take 1 Tab by mouth 2 times a day as needed for Sleep or Anxiety for up to 30 days. 30 Tab 1   • levonorgestrel (MIRENA) 20 MCG/24HR IUD 1 Each by Intrauterine route Once.       No current facility-administered medications for this visit.        Social History     Tobacco Use   • Smoking status: Never Smoker   • Smokeless tobacco: Never Used   Substance Use Topics   • Alcohol use: Yes     Alcohol/week: 0.0 oz     Comment: occ 1 every few months   • Drug use: No       Family Status   Relation Name Status   • Mo  Alive   • Fa  Alive     Family History   Problem Relation Age of Onset   • Heart Disease Father         50     Health Maintenance Due   Topic Date Due   • PAP SMEAR  12/06/2019       ROS:  Review of Systems   Constitutional: Negative for fever, chills, weight loss and malaise/fatigue.   HENT: Negative for ear pain, nosebleeds, congestion, sore throat and neck pain.    Eyes: Negative for blurred vision.   Respiratory: Negative for cough, sputum production, shortness of  "breath and wheezing.    Cardiovascular: Negative for chest pain, palpitations, orthopnea and leg swelling.   Gastrointestinal: Negative for heartburn, nausea, vomiting and abdominal pain.   Genitourinary: Negative for dysuria, urgency and frequency.   Musculoskeletal: Negative for myalgias, back pain and joint pain.   Skin: Negative for rash and itching.   Neurological: Negative for dizziness, tingling, tremors, sensory change, focal weakness and headaches.   Endo/Heme/Allergies: Does not bruise/bleed easily.   Psychiatric/Behavioral: Negative for depression, suicidal ideas and memory loss.  The patient is not nervous/anxious and does not have insomnia.      Exam:  /68 (BP Location: Right arm, Patient Position: Sitting, BP Cuff Size: Adult)   Pulse 70   Temp 36.7 °C (98.1 °F)   Resp 12   Ht 1.626 m (5' 4\")   Wt 58.5 kg (129 lb)   SpO2 98%   General:  Well nourished, well developed female in NAD  HEENT: Normocephalic and atraumatic. TMs clear bilaterally. Oropharynx is clear      without erythema and no tonsillar exudate. Nasal mucosa pink with minimal      Rhinorrhea.  EYES: EOMI.  Conjunctiva clear.    Neck: Supple without JVD or bruit. Thyroid is not enlarged.  Pulmonary: Clear to ausculation and percussion.  Normal effort. No rales, ronchi, or wheezing.  Cardiovascular: Regular rate and rhythm without murmur, no peripheral edema  Abdomen: positive bowel sounds.  Non tender, non distended, no hepatosplenomegaly.   MSK: normal ROM in upper and lower extremities bilaterally  Psych: appropriate affect and concentration, oriented to person and place  Neuro: no unilateral weakness in upper or lower extremities.  No gait disturbance    Please note that this dictation was created using voice recognition software. I have made every reasonable attempt to correct obvious errors, but I expect that there are errors of grammar and possibly content that I did not discover before finalizing the " note.    Assessment/Plan:  1. Well adult exam  Continue healthy lifestyle     2. Anxiety  Renewed   - escitalopram (LEXAPRO) 20 MG tablet; Take 1 Tab by mouth every day.  Dispense: 90 Tab; Refill: 1  - ALPRAZolam (XANAX) 0.5 MG Tab; Take 1 Tab by mouth 2 times a day as needed for Sleep or Anxiety for up to 30 days.  Dispense: 30 Tab; Refill: 1

## 2020-02-26 ENCOUNTER — HOSPITAL ENCOUNTER (OUTPATIENT)
Dept: LAB | Facility: MEDICAL CENTER | Age: 25
End: 2020-02-26
Payer: COMMERCIAL

## 2020-02-26 LAB
BDY FAT % MEASURED: 23.4 %
BP DIAS: 77 MMHG
BP SYS: 117 MMHG
CHOLEST SERPL-MCNC: 197 MG/DL (ref 100–199)
DIABETES HTDIA: NO
EVENT NAME HTEVT: NORMAL
FASTING HTFAS: YES
GLUCOSE SERPL-MCNC: 88 MG/DL (ref 65–99)
HDLC SERPL-MCNC: 47 MG/DL
HYPERTENSION HTHYP: NO
LDLC SERPL CALC-MCNC: 138 MG/DL
SCREENING LOC CITY HTCIT: NORMAL
SCREENING LOC STATE HTSTA: NORMAL
SCREENING LOCATION HTLOC: NORMAL
SMOKING HTSMO: NO
SUBSCRIBER ID HTSID: NORMAL
TRIGL SERPL-MCNC: 61 MG/DL (ref 0–149)

## 2020-02-26 PROCEDURE — 82947 ASSAY GLUCOSE BLOOD QUANT: CPT

## 2020-02-26 PROCEDURE — 80061 LIPID PANEL: CPT

## 2020-02-26 PROCEDURE — S5190 WELLNESS ASSESSMENT BY NONPH: HCPCS

## 2020-02-26 PROCEDURE — 36415 COLL VENOUS BLD VENIPUNCTURE: CPT

## 2020-03-07 ENCOUNTER — OFFICE VISIT (OUTPATIENT)
Dept: URGENT CARE | Facility: CLINIC | Age: 25
End: 2020-03-07
Payer: COMMERCIAL

## 2020-03-07 VITALS
DIASTOLIC BLOOD PRESSURE: 80 MMHG | OXYGEN SATURATION: 96 % | HEIGHT: 64 IN | TEMPERATURE: 97.6 F | WEIGHT: 129.6 LBS | RESPIRATION RATE: 16 BRPM | HEART RATE: 92 BPM | BODY MASS INDEX: 22.13 KG/M2 | SYSTOLIC BLOOD PRESSURE: 120 MMHG

## 2020-03-07 DIAGNOSIS — J06.9 UPPER RESPIRATORY VIRUS: ICD-10-CM

## 2020-03-07 DIAGNOSIS — R05.9 COUGH: ICD-10-CM

## 2020-03-07 DIAGNOSIS — J02.9 PHARYNGITIS, UNSPECIFIED ETIOLOGY: ICD-10-CM

## 2020-03-07 LAB
FLUAV+FLUBV AG SPEC QL IA: NEGATIVE
INT CON NEG: NORMAL
INT CON NEG: NORMAL
INT CON POS: NORMAL
INT CON POS: NORMAL
S PYO AG THROAT QL: NEGATIVE

## 2020-03-07 PROCEDURE — 87804 INFLUENZA ASSAY W/OPTIC: CPT | Performed by: NURSE PRACTITIONER

## 2020-03-07 PROCEDURE — 87880 STREP A ASSAY W/OPTIC: CPT | Performed by: NURSE PRACTITIONER

## 2020-03-07 PROCEDURE — 99214 OFFICE O/P EST MOD 30 MIN: CPT | Performed by: NURSE PRACTITIONER

## 2020-03-07 RX ORDER — DEXTROMETHORPHAN HYDROBROMIDE AND PROMETHAZINE HYDROCHLORIDE 15; 6.25 MG/5ML; MG/5ML
5 SYRUP ORAL EVERY 4 HOURS PRN
Qty: 100 ML | Refills: 0 | Status: SHIPPED | OUTPATIENT
Start: 2020-03-07 | End: 2020-03-14

## 2020-03-07 RX ORDER — DEXAMETHASONE SODIUM PHOSPHATE 4 MG/ML
10 INJECTION, SOLUTION INTRA-ARTICULAR; INTRALESIONAL; INTRAMUSCULAR; INTRAVENOUS; SOFT TISSUE ONCE
Status: COMPLETED | OUTPATIENT
Start: 2020-03-07 | End: 2020-03-07

## 2020-03-07 RX ADMIN — DEXAMETHASONE SODIUM PHOSPHATE 10 MG: 4 INJECTION, SOLUTION INTRA-ARTICULAR; INTRALESIONAL; INTRAMUSCULAR; INTRAVENOUS; SOFT TISSUE at 12:59

## 2020-03-07 ASSESSMENT — ENCOUNTER SYMPTOMS
HEARTBURN: 0
DIARRHEA: 0
CHILLS: 0
BLURRED VISION: 0
VOMITING: 0
PALPITATIONS: 0
MYALGIAS: 1
SPUTUM PRODUCTION: 0
ABDOMINAL PAIN: 0
BACK PAIN: 0
NAUSEA: 0
SORE THROAT: 1
RHINORRHEA: 0
COUGH: 1
DOUBLE VISION: 0
SHORTNESS OF BREATH: 0
WHEEZING: 0
HEADACHES: 0
FEVER: 1

## 2020-03-07 NOTE — PROGRESS NOTES
Subjective:     Barb Valdez is a 24 y.o. female who presents for Cough (x yesterday, nonproductive cough, chest congestion, low grade fever)      Cough   The current episode started in the past 7 days (2 days ago). The problem has been gradually worsening. The cough is non-productive. Associated symptoms include a fever, myalgias, nasal congestion and a sore throat. Pertinent negatives include no chest pain, chills, ear pain, headaches, heartburn, rash, rhinorrhea, shortness of breath or wheezing. Associated symptoms comments: 99.2. Nothing aggravates the symptoms. Treatments tried: dayquil/nyquil. The treatment provided mild relief. There is no history of asthma, bronchitis or pneumonia.       Review of Systems   Constitutional: Positive for fever and malaise/fatigue. Negative for chills.   HENT: Positive for sore throat. Negative for ear discharge, ear pain and rhinorrhea.    Eyes: Negative for blurred vision and double vision.   Respiratory: Positive for cough. Negative for sputum production, shortness of breath and wheezing.    Cardiovascular: Negative for chest pain and palpitations.   Gastrointestinal: Negative for abdominal pain, diarrhea, heartburn, nausea and vomiting.   Genitourinary: Negative for dysuria, frequency and urgency.   Musculoskeletal: Positive for myalgias. Negative for back pain.   Skin: Negative for itching and rash.   Neurological: Negative for headaches.   All other systems reviewed and are negative.      PMH:   Past Medical History:   Diagnosis Date   • Active medical problems: none    • IUD (intrauterine device) in place      ALLERGIES: No Known Allergies  SURGHX: History reviewed. No pertinent surgical history.  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   Social Needs   • Financial resource strain: Not on file   • Food insecurity     Worry: Not on file     Inability: Not on file   • Transportation needs     Medical: Not on file     Non-medical: Not on file   Tobacco Use   • Smoking status: Never Smoker   • Smokeless tobacco: Never Used   Substance and Sexual Activity   • Alcohol use: Yes     Alcohol/week: 0.0 oz     Comment: occ 1 every few months   • Drug use: No   • Sexual activity: Yes     Partners: Male     Birth control/protection: Condom, Pill   Lifestyle   • Physical activity     Days per week: Not on file     Minutes per session: Not on file   • Stress: Not on file   Relationships   • Social connections     Talks on phone: Not on file     Gets together: Not on file     Attends [employer] service: Not on file     Active member of club or organization: Not on file     Attends meetings of clubs or organizations: Not on file     Relationship status: Not on file   • Intimate partner violence     Fear of current or ex partner: Not on file     Emotionally abused: Not on file     Physically abused: Not on file     Forced sexual activity: Not on file   Other Topics Concern   • Not on file   Social History Narrative   • Not on file     FH:   Family History   Problem Relation Age of Onset   • Heart Disease Father         50         Objective:   /80 (BP Location: Left arm, Patient Position: Sitting, BP Cuff Size: Adult)   Pulse 92   Temp 36.4 °C (97.6 °F) (Temporal)   Resp 16   Ht 1.626 m (5' 4\")   Wt 58.8 kg (129 lb 9.6 oz)   SpO2 96%   BMI 22.25 kg/m²     Physical Exam  Constitutional:       General: She is not in acute distress.     Appearance: She is ill-appearing.   HENT:      Head: Normocephalic and atraumatic.      Right Ear: Tympanic membrane, ear canal and external ear normal. There is no impacted cerumen.      Left Ear: Tympanic membrane, ear canal and external ear normal. There is no impacted cerumen.      Nose: Congestion " and rhinorrhea present.      Mouth/Throat:      Mouth: Mucous membranes are moist.      Pharynx: Posterior oropharyngeal erythema present. No oropharyngeal exudate.   Eyes:      General:         Right eye: No discharge.         Left eye: No discharge.      Extraocular Movements: Extraocular movements intact.      Conjunctiva/sclera: Conjunctivae normal.      Pupils: Pupils are equal, round, and reactive to light.   Neck:      Musculoskeletal: Normal range of motion. No muscular tenderness.   Cardiovascular:      Rate and Rhythm: Tachycardia present.      Heart sounds: Normal heart sounds. No murmur.   Pulmonary:      Effort: Pulmonary effort is normal. No respiratory distress.      Breath sounds: Normal breath sounds. No stridor. No wheezing or rhonchi.   Abdominal:      General: Abdomen is flat. Bowel sounds are normal. There is no distension.      Palpations: Abdomen is soft.      Tenderness: There is no abdominal tenderness.   Musculoskeletal: Normal range of motion.   Lymphadenopathy:      Cervical: No cervical adenopathy.   Skin:     General: Skin is warm and dry.      Capillary Refill: Capillary refill takes less than 2 seconds.   Neurological:      General: No focal deficit present.      Mental Status: She is alert and oriented to person, place, and time. Mental status is at baseline.   Psychiatric:         Mood and Affect: Mood normal.         Behavior: Behavior normal.         Thought Content: Thought content normal.         Judgment: Judgment normal.         Assessment/Plan:   Assessment    1. Upper respiratory virus  POCT Rapid Strep A    POCT Influenza A/B   2. Cough  promethazine-dextromethorphan (PROMETHAZINE-DM) 6.25-15 MG/5ML syrup    POCT Influenza A/B   3. Pharyngitis, unspecified etiology  dexamethasone (DECADRON) injection 10 mg    POCT Rapid Strep A     Discussed with patient symptoms are viral in nature, there is low concern for any respiratory infection currently. Antibiotics are not advised  at this time.  We discussed supportive measures including humidifier, warm salt water gargles, over-the-counter Cepacol throat lozenges, rest  and increased fluids.  She was encouraged to seek treatment back in the ER or urgent care for worsening symptoms,  fever greater than 100.5, wheezes or shortness of breath.    AVS handout given and reviewed with patient. Pt educated on red flags and when to seek treatment back in ER or UC.

## 2020-03-07 NOTE — LETTER
March 7, 2020    To Whom It May Concern:         This is confirmation that Barb Valdez attended her scheduled appointment with SASHA Boyle on 3/07/20. She may return to work 03/11/2020.          If you have any questions please do not hesitate to call me at the phone number listed below.    Sincerely,          ROBB Boyle.  [phone]

## 2020-03-18 DIAGNOSIS — F41.9 ANXIETY: ICD-10-CM

## 2020-03-19 RX ORDER — ESCITALOPRAM OXALATE 20 MG/1
20 TABLET ORAL
Qty: 90 TAB | Refills: 1 | Status: SHIPPED | OUTPATIENT
Start: 2020-03-19 | End: 2020-09-23

## 2020-09-21 ENCOUNTER — IMMUNIZATION (OUTPATIENT)
Dept: OCCUPATIONAL MEDICINE | Facility: CLINIC | Age: 25
End: 2020-09-21

## 2020-09-21 DIAGNOSIS — Z23 NEED FOR VACCINATION: ICD-10-CM

## 2020-09-21 DIAGNOSIS — F41.9 ANXIETY: ICD-10-CM

## 2020-09-21 PROCEDURE — 90686 IIV4 VACC NO PRSV 0.5 ML IM: CPT | Performed by: PREVENTIVE MEDICINE

## 2020-09-23 RX ORDER — ESCITALOPRAM OXALATE 20 MG/1
TABLET ORAL
Qty: 90 TAB | Refills: 0 | Status: SHIPPED | OUTPATIENT
Start: 2020-09-23 | End: 2020-12-11 | Stop reason: SDUPTHER

## 2020-09-23 NOTE — TELEPHONE ENCOUNTER
Last seen by PCP 12/11/19. Will send 3 month(s) to the pharmacy.  Pt to make appt prior to more refills.

## 2020-12-11 ENCOUNTER — TELEMEDICINE (OUTPATIENT)
Dept: MEDICAL GROUP | Facility: PHYSICIAN GROUP | Age: 25
End: 2020-12-11
Payer: COMMERCIAL

## 2020-12-11 VITALS — HEIGHT: 64 IN | BODY MASS INDEX: 23.05 KG/M2 | WEIGHT: 135 LBS

## 2020-12-11 DIAGNOSIS — F41.9 ANXIETY: ICD-10-CM

## 2020-12-11 DIAGNOSIS — Z00.00 WELL ADULT EXAM: ICD-10-CM

## 2020-12-11 PROCEDURE — 99395 PREV VISIT EST AGE 18-39: CPT | Mod: 95,CR | Performed by: FAMILY MEDICINE

## 2020-12-11 RX ORDER — ALPRAZOLAM 0.5 MG/1
0.5 TABLET ORAL 2 TIMES DAILY PRN
COMMUNITY
End: 2020-12-11 | Stop reason: SDUPTHER

## 2020-12-11 RX ORDER — ALPRAZOLAM 0.5 MG/1
0.5 TABLET ORAL 2 TIMES DAILY PRN
Qty: 30 TAB | Refills: 0 | Status: SHIPPED | OUTPATIENT
Start: 2020-12-11 | End: 2021-01-10

## 2020-12-11 RX ORDER — ESCITALOPRAM OXALATE 20 MG/1
20 TABLET ORAL
Qty: 90 TAB | Refills: 3 | Status: SHIPPED | OUTPATIENT
Start: 2020-12-11 | End: 2022-12-08

## 2020-12-11 ASSESSMENT — PATIENT HEALTH QUESTIONNAIRE - PHQ9: CLINICAL INTERPRETATION OF PHQ2 SCORE: 0

## 2020-12-11 NOTE — PROGRESS NOTES
Chief Complaint   Patient presents with   • Annual Exam       HISTORY OF PRESENT ILLNESS: Patient is a 25 y.o. female established patient here today for the following concerns:    This encounter was conducted via Zoom .   Verbal consent was obtained. Patient's identity was verified.      1. Well adult exam  2. Anxiety  Here for annual wellness review and medication renewals.  Reports she is now working in [email or web address] for [employer] and is enjoying it.   She continues on Lexapro for anxiety with good improvement of baseline symptoms and no side effects.  She has only needed the alprazolam maybe once per month but would like there to be a refill in transition to a new provider.  She is due for pap and will be following up with her Gynecologist for this.  Mirena remains in place.        Past Medical, Social, and Family history reviewed and updated in EPIC    Allergies:Patient has no known allergies.    Current Outpatient Medications   Medication Sig Dispense Refill   • escitalopram (LEXAPRO) 20 MG tablet Take 1 Tab by mouth every day. 90 Tab 3   • ALPRAZolam (XANAX) 0.5 MG Tab Take 1 Tab by mouth 2 times a day as needed for Sleep for up to 30 days. 30 Tab 0   • levonorgestrel (MIRENA) 20 MCG/24HR IUD 1 Each by Intrauterine route Once.       No current facility-administered medications for this visit.          ROS:  Review of Systems   Constitutional: Negative for fever, chills, weight loss and malaise/fatigue.   HENT: Negative for ear pain, nosebleeds, congestion, sore throat and neck pain.    Eyes: Negative for blurred vision.   Respiratory: Negative for cough, sputum production, shortness of breath and wheezing.    Cardiovascular: Negative for chest pain, palpitations,  and leg swelling.   Gastrointestinal: Negative for heartburn, nausea, vomiting, diarrhea and abdominal pain.   Genitourinary: Negative for dysuria, urgency and frequency.   Musculoskeletal: Negative for myalgias, back pain and joint pain.   Skin: Negative for rash  "and itching.   Neurological: Negative for dizziness, tingling, tremors, sensory change, focal weakness and headaches.   Endo/Heme/Allergies: Does not bruise/bleed easily.   Psychiatric/Behavioral: Negative for depression, anxiety, suicidal ideas, insomnia and memory loss.      Exam:  Ht 1.626 m (5' 4\")   Wt 61.2 kg (135 lb)     General:  Well nourished, well developed in NAD  Head is grossly normal.  Neck: Supple without JVD, Trachea midline, no thyromegaly noted  Pulmonary:  Normal effort. Speaking in full sentences  Psych: affect appropriate  Skin: no Jaundice noted or facial rashes    Please note that this dictation was created using voice recognition software. I have made every reasonable attempt to correct obvious errors, but I expect that there are errors of grammar and possibly content that I did not discover before finalizing the note.    Assessment/Plan:  1. Well adult exam  Continue healthy lifestyle.   PAP with gyn this year    2. Anxiety  Renewed meds.   - escitalopram (LEXAPRO) 20 MG tablet; Take 1 Tab by mouth every day.  Dispense: 90 Tab; Refill: 3  - ALPRAZolam (XANAX) 0.5 MG Tab; Take 1 Tab by mouth 2 times a day as needed for Sleep for up to 30 days.  Dispense: 30 Tab; Refill: 0    Transitional care discussed.         "

## 2020-12-17 DIAGNOSIS — Z23 NEED FOR VACCINATION: ICD-10-CM

## 2020-12-18 ENCOUNTER — APPOINTMENT (OUTPATIENT)
Dept: FAMILY PLANNING/WOMEN'S HEALTH CLINIC | Facility: IMMUNIZATION CENTER | Age: 25
End: 2020-12-18
Attending: FAMILY MEDICINE
Payer: COMMERCIAL

## 2020-12-18 DIAGNOSIS — Z23 NEED FOR VACCINATION: ICD-10-CM

## 2020-12-18 PROCEDURE — 0001A PFIZER SARS-COV-2 VACCINE: CPT

## 2020-12-18 PROCEDURE — 91300 PFIZER SARS-COV-2 VACCINE: CPT

## 2020-12-19 ENCOUNTER — IMMUNIZATION (OUTPATIENT)
Dept: FAMILY PLANNING/WOMEN'S HEALTH CLINIC | Facility: IMMUNIZATION CENTER | Age: 25
End: 2020-12-19

## 2020-12-19 DIAGNOSIS — Z23 ENCOUNTER FOR VACCINATION: Primary | ICD-10-CM

## 2021-01-08 ENCOUNTER — IMMUNIZATION (OUTPATIENT)
Dept: FAMILY PLANNING/WOMEN'S HEALTH CLINIC | Facility: IMMUNIZATION CENTER | Age: 26
End: 2021-01-08
Attending: FAMILY MEDICINE
Payer: COMMERCIAL

## 2021-01-08 DIAGNOSIS — Z23 ENCOUNTER FOR VACCINATION: Primary | ICD-10-CM

## 2021-01-08 PROCEDURE — 0002A PFIZER SARS-COV-2 VACCINE: CPT

## 2021-01-08 PROCEDURE — 91300 PFIZER SARS-COV-2 VACCINE: CPT

## 2021-02-25 NOTE — PROGRESS NOTES
Well woman visit     Barb Valdez is a 25 y.o.  who presents to [facility] care and for her Annual Exam.  Patient reports that she has had a Mirena IUD in place for the past 2 years and she has been very happy with the device.  She is due for her Pap but denies any other complaints today.  Denies any breast concerns, urinary or bowel complaints.  No abnormal vaginal discharge, dyspareunia or other concerns.    GYN History:  No LMP recorded. Patient has had an implant.. Mirena Menarche @13.  Menses  Unsure as has Mirena - placed 2 years ago.  Last pap 2016,  no h/o abnormal pap.  No history of cone biopsy, LEEP or any other cervical, uterine or gynecologic surgery. No history of sexually transmitted diseases.  Currently sexually active with .  Utilizing Mirena for contraception and has used OCPs in the past.     OB History:    OB History    Para Term  AB Living   0 0 0 0 0 0   SAB TAB Ectopic Molar Multiple Live Births   0 0 0 0 0 0     Health Maintenance:  Immunizations: up to date -has had HPV vaccine    Review of Systems:   Pertinent positives documented in HPI and all other systems reviewed & are negative.    All PMH, PSH, allergies, social history and FH reviewed and updated today:  Past Medical History:  Past Medical History:   Diagnosis Date   • Active medical problems: none    • Anxiety    • Depression    • IUD (intrauterine device) in place        Past Surgical History:  History reviewed. No pertinent surgical history.    Medications:   Current Outpatient Medications Ordered in Epic   Medication Sig Dispense Refill   • escitalopram (LEXAPRO) 20 MG tablet Take 1 Tab by mouth every day. 90 Tab 3   • levonorgestrel (MIRENA) 20 MCG/24HR IUD 1 Each by Intrauterine route Once.       No current Epic-ordered facility-administered medications on file.       Allergies: Patient has no known allergies.    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   Other Topics Concern   • Not on file   Social History Narrative   • Not on file     Social Determinants of Health     Financial Resource Strain:    • Difficulty of Paying Living Expenses:    Food Insecurity:    • Worried About Running Out of Food in the Last Year:    • Ran Out of Food in the Last Year:    Transportation Needs:    • Lack of Transportation (Medical):    • Lack of Transportation (Non-Medical):    Physical Activity:    • Days of Exercise per Week:    • Minutes of Exercise per Session:    Stress:    • Feeling of Stress :    Social Connections:    • Frequency of Communication with Friends and Family:    • Frequency of Social Gatherings with Friends and Family:    • Attends Mosque Services:    • Active Member of Clubs or Organizations:    • Attends Club or Organization Meetings:    • Marital Status:    Intimate Partner Violence:    • Fear of Current or Ex-Partner:    • Emotionally Abused:    • Physically Abused:    • Sexually Abused:        Family History:  Family History   Problem Relation Age of Onset   • No Known Problems Mother    • Heart Disease Father         50           Objective:   Vitals:  /73   Wt 59.4 kg (131 lb)   Body mass index is 22.49 kg/m². (Goal BM I>18 <25)    Physical Exam:   Nursing note and vitals reviewed.  Physical Exam   Constitutional: She is oriented to person, place, and time and well-developed, well-nourished, and in no distress.   HENT:   Head:  Normocephalic and atraumatic.   Eyes: Pupils are equal, round, and reactive to light. Conjunctivae are normal.   Neck: No thyromegaly present.   Cardiovascular: Intact distal pulses.   Pulmonary/Chest: Effort normal. Right breast exhibits no inverted nipple, no mass, no nipple discharge, no skin change and no tenderness. Left breast exhibits no inverted nipple, no mass, no nipple discharge, no skin change and no tenderness.   Abdominal: Soft. Bowel sounds are normal.   Musculoskeletal:         General: No deformity or edema. Normal range of motion.      Cervical back: Normal range of motion and neck supple.   Neurological: She is alert and oriented to person, place, and time.   Skin: Skin is warm and dry.   Psychiatric: Mood, memory, affect and judgment normal.     Genitourinary:  Normal appearing external female genitalia without any rashes, lesions, labial fusion or tenderness.  Normal appearing urethral meatus with no lesions or prolapse, normal urethra with no masses/tenderness.  Vagina is pink moist and well rugated. Physiologic discharge present within vaginal vault.  Cervix well visualized without masses or lesions.  IUD strings at office normal appearing anus with no visible hemorrhoids. On bimanual exam, bladder is non tender, there is no cervical motion tenderness, uterus is anteverted, not enlarged, fixed, or tender.  No adnexal masses or tenderness.      Assessment/Plan:     No diagnosis found.    Barb Valdez is a 25 y.o.  female who presents for  her annual gynecologic exam.   # Preventative health care:   --Breast self awareness discussed. Mammogram not yet indicated (annually starting at age 40).  --Cervical cancer screening. Last Pap 2016. Collected today. HPV reflex sent. I will follow up with patient once results are back as per ASCCP guidelines.   --Immunizations are  up to date.  --Diet, exercise, vitamin supplementation, and hydration discussed.  # Contraception: Mirena  # STD  screening: GC CT sent  # Follow up annually or sooner if needed.

## 2021-02-26 ENCOUNTER — GYNECOLOGY VISIT (OUTPATIENT)
Dept: OBGYN | Facility: CLINIC | Age: 26
End: 2021-02-26
Payer: COMMERCIAL

## 2021-02-26 ENCOUNTER — HOSPITAL ENCOUNTER (OUTPATIENT)
Facility: MEDICAL CENTER | Age: 26
End: 2021-02-26
Attending: OBSTETRICS & GYNECOLOGY
Payer: COMMERCIAL

## 2021-02-26 VITALS — BODY MASS INDEX: 22.49 KG/M2 | SYSTOLIC BLOOD PRESSURE: 115 MMHG | DIASTOLIC BLOOD PRESSURE: 73 MMHG | WEIGHT: 131 LBS

## 2021-02-26 DIAGNOSIS — Z01.419 WELL WOMAN EXAM: ICD-10-CM

## 2021-02-26 DIAGNOSIS — Z12.39 ENCOUNTER FOR BREAST CANCER SCREENING USING NON-MAMMOGRAM MODALITY: ICD-10-CM

## 2021-02-26 DIAGNOSIS — Z11.3 SCREEN FOR SEXUALLY TRANSMITTED DISEASES: ICD-10-CM

## 2021-02-26 DIAGNOSIS — Z12.4 SCREENING FOR CERVICAL CANCER: ICD-10-CM

## 2021-02-26 PROCEDURE — 87491 CHLMYD TRACH DNA AMP PROBE: CPT

## 2021-02-26 PROCEDURE — 99395 PREV VISIT EST AGE 18-39: CPT | Performed by: OBSTETRICS & GYNECOLOGY

## 2021-02-26 PROCEDURE — 87591 N.GONORRHOEAE DNA AMP PROB: CPT

## 2021-02-26 PROCEDURE — 88175 CYTOPATH C/V AUTO FLUID REDO: CPT

## 2021-02-26 NOTE — NON-PROVIDER
Pt here for new pt appt  Pt states no complints  Pharmacy verified  Good #:[phone]   LMP: unknown  PAP: 2016 normal  BC: Mirena

## 2021-02-27 DIAGNOSIS — Z11.3 SCREEN FOR SEXUALLY TRANSMITTED DISEASES: ICD-10-CM

## 2021-02-27 DIAGNOSIS — Z12.4 SCREENING FOR CERVICAL CANCER: ICD-10-CM

## 2021-02-27 LAB — AMBIGUOUS DTTM AMBI4: NORMAL

## 2021-03-01 LAB
C TRACH DNA GENITAL QL NAA+PROBE: NEGATIVE
CYTOLOGY REG CYTOL: NORMAL
N GONORRHOEA DNA GENITAL QL NAA+PROBE: NEGATIVE
SPECIMEN SOURCE: NORMAL

## 2021-03-12 ENCOUNTER — HOSPITAL ENCOUNTER (OUTPATIENT)
Dept: LAB | Facility: MEDICAL CENTER | Age: 26
End: 2021-03-12
Attending: FAMILY MEDICINE
Payer: COMMERCIAL

## 2021-03-12 LAB
ALBUMIN SERPL BCP-MCNC: 4.5 G/DL (ref 3.2–4.9)
ALBUMIN/GLOB SERPL: 1.6 G/DL
ALP SERPL-CCNC: 66 U/L (ref 30–99)
ALT SERPL-CCNC: 15 U/L (ref 2–50)
ANION GAP SERPL CALC-SCNC: 11 MMOL/L (ref 7–16)
APPEARANCE UR: ABNORMAL
AST SERPL-CCNC: 18 U/L (ref 12–45)
BACTERIA #/AREA URNS HPF: NEGATIVE /HPF
BASOPHILS # BLD AUTO: 0.6 % (ref 0–1.8)
BASOPHILS # BLD: 0.04 K/UL (ref 0–0.12)
BILIRUB SERPL-MCNC: 0.4 MG/DL (ref 0.1–1.5)
BILIRUB UR QL STRIP.AUTO: NEGATIVE
BUN SERPL-MCNC: 14 MG/DL (ref 8–22)
CALCIUM SERPL-MCNC: 9.8 MG/DL (ref 8.5–10.5)
CHLORIDE SERPL-SCNC: 101 MMOL/L (ref 96–112)
CHOLEST SERPL-MCNC: 191 MG/DL (ref 100–199)
CK SERPL-CCNC: 84 U/L (ref 0–154)
CO2 SERPL-SCNC: 25 MMOL/L (ref 20–33)
COLOR UR: YELLOW
CREAT SERPL-MCNC: 0.55 MG/DL (ref 0.5–1.4)
CRP SERPL HS-MCNC: 0.2 MG/DL (ref 0–0.75)
EOSINOPHIL # BLD AUTO: 0.14 K/UL (ref 0–0.51)
EOSINOPHIL NFR BLD: 2.3 % (ref 0–6.9)
EPI CELLS #/AREA URNS HPF: ABNORMAL /HPF
ERYTHROCYTE [DISTWIDTH] IN BLOOD BY AUTOMATED COUNT: 40.3 FL (ref 35.9–50)
EST. AVERAGE GLUCOSE BLD GHB EST-MCNC: 100 MG/DL
FASTING STATUS PATIENT QL REPORTED: NORMAL
GLOBULIN SER CALC-MCNC: 2.8 G/DL (ref 1.9–3.5)
GLUCOSE SERPL-MCNC: 89 MG/DL (ref 65–99)
GLUCOSE UR STRIP.AUTO-MCNC: NEGATIVE MG/DL
HBA1C MFR BLD: 5.1 % (ref 4–5.6)
HCT VFR BLD AUTO: 44.4 % (ref 37–47)
HDLC SERPL-MCNC: 44 MG/DL
HGB BLD-MCNC: 14.3 G/DL (ref 12–16)
HYALINE CASTS #/AREA URNS LPF: ABNORMAL /LPF
IMM GRANULOCYTES # BLD AUTO: 0.02 K/UL (ref 0–0.11)
IMM GRANULOCYTES NFR BLD AUTO: 0.3 % (ref 0–0.9)
KETONES UR STRIP.AUTO-MCNC: NEGATIVE MG/DL
LDLC SERPL CALC-MCNC: 135 MG/DL
LEUKOCYTE ESTERASE UR QL STRIP.AUTO: NEGATIVE
LYMPHOCYTES # BLD AUTO: 2.07 K/UL (ref 1–4.8)
LYMPHOCYTES NFR BLD: 33.4 % (ref 22–41)
MCH RBC QN AUTO: 29.3 PG (ref 27–33)
MCHC RBC AUTO-ENTMCNC: 32.2 G/DL (ref 33.6–35)
MCV RBC AUTO: 91 FL (ref 81.4–97.8)
MICRO URNS: ABNORMAL
MONOCYTES # BLD AUTO: 0.37 K/UL (ref 0–0.85)
MONOCYTES NFR BLD AUTO: 6 % (ref 0–13.4)
NEUTROPHILS # BLD AUTO: 3.55 K/UL (ref 2–7.15)
NEUTROPHILS NFR BLD: 57.4 % (ref 44–72)
NITRITE UR QL STRIP.AUTO: NEGATIVE
NRBC # BLD AUTO: 0 K/UL
NRBC BLD-RTO: 0 /100 WBC
PH UR STRIP.AUTO: 5.5 [PH] (ref 5–8)
PLATELET # BLD AUTO: 329 K/UL (ref 164–446)
PMV BLD AUTO: 9.9 FL (ref 9–12.9)
POTASSIUM SERPL-SCNC: 4.1 MMOL/L (ref 3.6–5.5)
PROT SERPL-MCNC: 7.3 G/DL (ref 6–8.2)
PROT UR QL STRIP: NEGATIVE MG/DL
RBC # BLD AUTO: 4.88 M/UL (ref 4.2–5.4)
RBC # URNS HPF: ABNORMAL /HPF
RBC UR QL AUTO: NEGATIVE
SODIUM SERPL-SCNC: 137 MMOL/L (ref 135–145)
SP GR UR STRIP.AUTO: 1.03
TRIGL SERPL-MCNC: 61 MG/DL (ref 0–149)
TSH SERPL DL<=0.005 MIU/L-ACNC: 1.61 UIU/ML (ref 0.38–5.33)
UROBILINOGEN UR STRIP.AUTO-MCNC: 0.2 MG/DL
WBC # BLD AUTO: 6.2 K/UL (ref 4.8–10.8)
WBC #/AREA URNS HPF: ABNORMAL /HPF

## 2021-03-12 PROCEDURE — 80053 COMPREHEN METABOLIC PANEL: CPT

## 2021-03-12 PROCEDURE — 82550 ASSAY OF CK (CPK): CPT

## 2021-03-12 PROCEDURE — 86140 C-REACTIVE PROTEIN: CPT

## 2021-03-12 PROCEDURE — 83036 HEMOGLOBIN GLYCOSYLATED A1C: CPT

## 2021-03-12 PROCEDURE — 81001 URINALYSIS AUTO W/SCOPE: CPT

## 2021-03-12 PROCEDURE — 80061 LIPID PANEL: CPT

## 2021-03-12 PROCEDURE — 84443 ASSAY THYROID STIM HORMONE: CPT

## 2021-03-12 PROCEDURE — 36415 COLL VENOUS BLD VENIPUNCTURE: CPT

## 2021-03-12 PROCEDURE — 85025 COMPLETE CBC W/AUTO DIFF WBC: CPT

## 2021-03-19 ENCOUNTER — HOSPITAL ENCOUNTER (OUTPATIENT)
Dept: RADIOLOGY | Facility: MEDICAL CENTER | Age: 26
End: 2021-03-19
Attending: FAMILY MEDICINE
Payer: COMMERCIAL

## 2021-03-19 DIAGNOSIS — E04.9 ENLARGEMENT OF THYROID: ICD-10-CM

## 2021-03-19 PROCEDURE — 76536 US EXAM OF HEAD AND NECK: CPT

## 2021-09-27 ENCOUNTER — IMMUNIZATION (OUTPATIENT)
Dept: OCCUPATIONAL MEDICINE | Facility: CLINIC | Age: 26
End: 2021-09-27
Payer: COMMERCIAL

## 2021-09-27 DIAGNOSIS — Z23 NEED FOR VACCINATION: Primary | ICD-10-CM

## 2021-09-27 PROCEDURE — 90686 IIV4 VACC NO PRSV 0.5 ML IM: CPT | Performed by: FAMILY MEDICINE

## 2022-03-22 NOTE — PATIENT INSTRUCTIONS
Urinary Tract Infection, Adult  A urinary tract infection (UTI) is an infection of any part of the urinary tract, which includes the kidneys, ureters, bladder, and urethra. These organs make, store, and get rid of urine in the body. UTI can be a bladder infection (cystitis) or kidney infection (pyelonephritis).  What are the causes?  This infection may be caused by fungi, viruses, or bacteria. Bacteria are the most common cause of UTIs. This condition can also be caused by repeated incomplete emptying of the bladder during urination.  What increases the risk?  This condition is more likely to develop if:  · You ignore your need to urinate or hold urine for long periods of time.  · You do not empty your bladder completely during urination.  · You wipe back to front after urinating or having a bowel movement, if you are female.  · You are uncircumcised, if you are male.  · You are constipated.  · You have a urinary catheter that stays in place (indwelling).  · You have a weak defense (immune) system.  · You have a medical condition that affects your bowels, kidneys, or bladder.  · You have diabetes.  · You take antibiotic medicines frequently or for long periods of time, and the antibiotics no longer work well against certain types of infections (antibiotic resistance).  · You take medicines that irritate your urinary tract.  · You are exposed to chemicals that irritate your urinary tract.  · You are female.  What are the signs or symptoms?  Symptoms of this condition include:  · Fever.  · Frequent urination or passing small amounts of urine frequently.  · Needing to urinate urgently.  · Pain or burning with urination.  · Urine that smells bad or unusual.  · Cloudy urine.  · Pain in the lower abdomen or back.  · Trouble urinating.  · Blood in the urine.  · Vomiting or being less hungry than normal.  · Diarrhea or abdominal pain.  · Vaginal discharge, if you are female.  How is this diagnosed?  This condition is  diagnosed with a medical history and physical exam. You will also need to provide a urine sample to test your urine. Other tests may be done, including:  · Blood tests.  · Sexually transmitted disease (STD) testing.  If you have had more than one UTI, a cystoscopy or imaging studies may be done to determine the cause of the infections.  How is this treated?  Treatment for this condition often includes a combination of two or more of the following:  · Antibiotic medicine.  · Other medicines to treat less common causes of UTI.  · Over-the-counter medicines to treat pain.  · Drinking enough water to stay hydrated.  Follow these instructions at home:  · Take over-the-counter and prescription medicines only as told by your health care provider.  · If you were prescribed an antibiotic, take it as told by your health care provider. Do not stop taking the antibiotic even if you start to feel better.  · Avoid alcohol, caffeine, tea, and carbonated beverages. They can irritate your bladder.  · Drink enough fluid to keep your urine clear or pale yellow.  · Keep all follow-up visits as told by your health care provider. This is important.  · Make sure to:  ¨ Empty your bladder often and completely. Do not hold urine for long periods of time.  ¨ Empty your bladder before and after sex.  ¨ Wipe from front to back after a bowel movement if you are female. Use each tissue one time when you wipe.  Contact a health care provider if:  · You have back pain.  · You have a fever.  · You feel nauseous or vomit.  · Your symptoms do not get better after 3 days.  · Your symptoms go away and then return.  Get help right away if:  · You have severe back pain or lower abdominal pain.  · You are vomiting and cannot keep down any medicines or water.  This information is not intended to replace advice given to you by your health care provider. Make sure you discuss any questions you have with your health care provider.  Document Released:  09/27/2006 Document Revised: 05/31/2017 Document Reviewed: 11/07/2016  Elsevier Interactive Patient Education © 2017 [employer]vier Inc.     22-Mar-2022 17:39:18

## 2022-05-31 ENCOUNTER — GYNECOLOGY VISIT (OUTPATIENT)
Dept: OBGYN | Facility: CLINIC | Age: 27
End: 2022-05-31
Payer: COMMERCIAL

## 2022-05-31 VITALS — BODY MASS INDEX: 22.31 KG/M2 | WEIGHT: 130 LBS | DIASTOLIC BLOOD PRESSURE: 75 MMHG | SYSTOLIC BLOOD PRESSURE: 117 MMHG

## 2022-05-31 DIAGNOSIS — Z30.432 ENCOUNTER FOR IUD REMOVAL: Primary | ICD-10-CM

## 2022-05-31 PROCEDURE — 58301 REMOVE INTRAUTERINE DEVICE: CPT | Performed by: OBSTETRICS & GYNECOLOGY

## 2022-05-31 ASSESSMENT — FIBROSIS 4 INDEX: FIB4 SCORE: 0.37

## 2022-05-31 NOTE — PROCEDURES
Procedure note Mirena IUD removal    Informed consent obtained, consent signed    Vaginal speculum placed    Cervix visualized-IUD strings visualized    IUD strings grasped with [place] forceps    IUD removed intact    IUD shown to patient    Patient would like to conceive she is already started prenatal vitamins.

## 2022-09-16 ENCOUNTER — PHARMACY VISIT (OUTPATIENT)
Dept: PHARMACY | Facility: MEDICAL CENTER | Age: 27
End: 2022-09-16
Payer: COMMERCIAL

## 2022-09-16 PROCEDURE — RXMED WILLOW AMBULATORY MEDICATION CHARGE: Performed by: INTERNAL MEDICINE

## 2022-09-16 RX ORDER — BNT162B2 ORIGINAL AND OMICRON BA.4/BA.5 .1125; .1125 MG/2.25ML; MG/2.25ML
0.3 INJECTION, SUSPENSION INTRAMUSCULAR
Qty: 0.3 ML | Refills: 0 | Status: SHIPPED | OUTPATIENT
Start: 2022-09-16 | End: 2022-12-08

## 2022-10-11 ENCOUNTER — IMMUNIZATION (OUTPATIENT)
Dept: OCCUPATIONAL MEDICINE | Facility: CLINIC | Age: 27
End: 2022-10-11

## 2022-10-11 DIAGNOSIS — Z23 NEED FOR VACCINATION: Primary | ICD-10-CM

## 2022-10-11 PROCEDURE — 90686 IIV4 VACC NO PRSV 0.5 ML IM: CPT | Performed by: PREVENTIVE MEDICINE

## 2022-11-08 ENCOUNTER — GYNECOLOGY VISIT (OUTPATIENT)
Dept: OBGYN | Facility: CLINIC | Age: 27
End: 2022-11-08
Payer: COMMERCIAL

## 2022-11-08 VITALS — WEIGHT: 126 LBS | BODY MASS INDEX: 21.63 KG/M2 | DIASTOLIC BLOOD PRESSURE: 82 MMHG | SYSTOLIC BLOOD PRESSURE: 149 MMHG

## 2022-11-08 DIAGNOSIS — N93.8 DUB (DYSFUNCTIONAL UTERINE BLEEDING): Primary | ICD-10-CM

## 2022-11-08 PROCEDURE — 76830 TRANSVAGINAL US NON-OB: CPT | Performed by: OBSTETRICS & GYNECOLOGY

## 2022-11-08 PROCEDURE — 99214 OFFICE O/P EST MOD 30 MIN: CPT | Mod: 25 | Performed by: OBSTETRICS & GYNECOLOGY

## 2022-11-08 ASSESSMENT — FIBROSIS 4 INDEX: FIB4 SCORE: 0.38

## 2022-11-08 NOTE — PROGRESS NOTES
Chief complaint;  This patient is a 27 y.o. female , Patient's last menstrual period was 2022 (exact date). presents complaining of dysfunctional uterine bleeding.    Review of systems-denies; chest pain, shortness of breath, fever, chills, abdominal pain  Past OB history-  OB History    Para Term  AB Living   0 0 0 0 0 0   SAB IAB Ectopic Molar Multiple Live Births   0 0 0 0 0 0     Past surgical history- History reviewed. No pertinent surgical history.  Past medical history-   Past Medical History:   Diagnosis Date    Active medical problems: none     Anxiety     Depression     IUD (intrauterine device) in place      Allergies- Patient has no known allergies.  Present medications-   Outpatient Encounter Medications as of 2022   Medication Sig Dispense Refill    COVID-19mRNA Bival Vacc Pfizer (PFIZER COVID-19 VAC BIVALENT) 30 MCG/0.3ML Suspension injection Inject 0.3 mL into the shoulder, thigh, or buttocks. 0.3 mL 0    escitalopram (LEXAPRO) 20 MG tablet Take 1 Tab by mouth every day. 90 Tab 3    levonorgestrel (MIRENA) 20 MCG/24HR IUD 1 Each by Intrauterine route Once.       No facility-administered encounter medications on file as of 2022.     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   Other Topics Concern    Not on file   Social History Narrative    Not  on file     Social Determinants of Health     Financial Resource Strain: Not on file   Food Insecurity: Not on file   Transportation Needs: Not on file   Physical Activity: Not on file   Stress: Not on file   Social Connections: Not on file   Intimate Partner Violence: Not on file   Housing Stability: Not on file         Physical examination;   Alert and oriented x3  General-well-developed well-nourished female in no apparent distress  Vitals:    11/08/22 0851   BP: (!) 149/82   BP Location: Right arm   Patient Position: Sitting   BP Cuff Size: Small adult   Weight: 126 lb     Skin is warm and dry   HEENT-normocephalic,nontraumatic,PERRLA,EOMI  Throat- no edema or erythema  Neck-supple  Cardiovascular-regular rate and rhythm, normal S1-S2 without murmurs or gallops  Lungs-clear to auscultation bilaterally  Back-negative CVA tenderness  Abdomen-nondistended positive bowel sounds soft nontender without masses or hepatosplenomegaly  Pelvic examination;  External genitalia-without lesions  Vagina-no blood, no discharge  Cervix-closed,no gross lesions  Uterus-  8 weeks size, nontender  Adnexa- without mass or tenderness  Extremities-without cyanosis clubbing or edema  Neurologic-grossly intact    Transvaginal ultrasound; as performed and read by myself; intrauterine gestational sac containing silver pregnancy crown-rump length consistent with 8 weeks 2 days, EDC 6/18/2023 positive cardiac motion 155 bpm no free pelvic fluid yolk sac present no obvious adnexal masses    Impression;  Dysfunctional uterine bleeding-viable pregnancy confirmed    Plan;     Needs initial OB      35 Minutes total spent with the patient in face-to-face contact,5 separate minutes of total time utilized to perform  Ultrasound, greater than 50% of the time has been spent on counseling and coordination of care.  Early DU B counseling performed-all questions answered in detail

## 2022-12-08 ENCOUNTER — INITIAL PRENATAL (OUTPATIENT)
Dept: OBGYN | Facility: CLINIC | Age: 27
End: 2022-12-08
Payer: COMMERCIAL

## 2022-12-08 ENCOUNTER — HOSPITAL ENCOUNTER (OUTPATIENT)
Dept: LAB | Facility: MEDICAL CENTER | Age: 27
End: 2022-12-08
Attending: OBSTETRICS & GYNECOLOGY
Payer: COMMERCIAL

## 2022-12-08 VITALS — WEIGHT: 123 LBS | BODY MASS INDEX: 21.11 KG/M2 | SYSTOLIC BLOOD PRESSURE: 129 MMHG | DIASTOLIC BLOOD PRESSURE: 75 MMHG

## 2022-12-08 DIAGNOSIS — Z34.01 ENCOUNTER FOR SUPERVISION OF NORMAL FIRST PREGNANCY IN FIRST TRIMESTER: Primary | ICD-10-CM

## 2022-12-08 DIAGNOSIS — Z34.01 ENCOUNTER FOR SUPERVISION OF NORMAL FIRST PREGNANCY IN FIRST TRIMESTER: ICD-10-CM

## 2022-12-08 PROBLEM — Z34.00 SUPERVISION OF NORMAL FIRST PREGNANCY: Status: ACTIVE | Noted: 2022-12-08

## 2022-12-08 LAB
ABO GROUP BLD: NORMAL
BLD GP AB SCN SERPL QL: NORMAL
C TRACH DNA SPEC QL NAA+PROBE: NEGATIVE
ERYTHROCYTE [DISTWIDTH] IN BLOOD BY AUTOMATED COUNT: 40 FL (ref 35.9–50)
HBV SURFACE AG SER QL: ABNORMAL
HCT VFR BLD AUTO: 40.4 % (ref 37–47)
HCV AB SER QL: ABNORMAL
HGB BLD-MCNC: 13.5 G/DL (ref 12–16)
HIV 1+2 AB+HIV1 P24 AG SERPL QL IA: NORMAL
MCH RBC QN AUTO: 29.8 PG (ref 27–33)
MCHC RBC AUTO-ENTMCNC: 33.4 G/DL (ref 33.6–35)
MCV RBC AUTO: 89.2 FL (ref 81.4–97.8)
N GONORRHOEA DNA SPEC QL NAA+PROBE: NEGATIVE
PLATELET # BLD AUTO: 312 K/UL (ref 164–446)
PMV BLD AUTO: 10 FL (ref 9–12.9)
RBC # BLD AUTO: 4.53 M/UL (ref 4.2–5.4)
RH BLD: NORMAL
RUBV AB SER QL: 17.6 IU/ML
SPECIMEN SOURCE: NORMAL
T PALLIDUM AB SER QL IA: ABNORMAL
WBC # BLD AUTO: 7.1 K/UL (ref 4.8–10.8)

## 2022-12-08 PROCEDURE — 86901 BLOOD TYPING SEROLOGIC RH(D): CPT

## 2022-12-08 PROCEDURE — 87591 N.GONORRHOEAE DNA AMP PROB: CPT

## 2022-12-08 PROCEDURE — 0501F PRENATAL FLOW SHEET: CPT | Performed by: OBSTETRICS & GYNECOLOGY

## 2022-12-08 PROCEDURE — 87491 CHLMYD TRACH DNA AMP PROBE: CPT

## 2022-12-08 PROCEDURE — 87077 CULTURE AEROBIC IDENTIFY: CPT

## 2022-12-08 PROCEDURE — 86900 BLOOD TYPING SEROLOGIC ABO: CPT

## 2022-12-08 PROCEDURE — 81422 FETAL CHRMOML MICRODELTJ: CPT

## 2022-12-08 PROCEDURE — 86762 RUBELLA ANTIBODY: CPT

## 2022-12-08 PROCEDURE — 86803 HEPATITIS C AB TEST: CPT

## 2022-12-08 PROCEDURE — 85027 COMPLETE CBC AUTOMATED: CPT

## 2022-12-08 PROCEDURE — 87340 HEPATITIS B SURFACE AG IA: CPT

## 2022-12-08 PROCEDURE — 87186 SC STD MICRODIL/AGAR DIL: CPT

## 2022-12-08 PROCEDURE — 87389 HIV-1 AG W/HIV-1&-2 AB AG IA: CPT

## 2022-12-08 PROCEDURE — 87086 URINE CULTURE/COLONY COUNT: CPT

## 2022-12-08 PROCEDURE — 80307 DRUG TEST PRSMV CHEM ANLYZR: CPT

## 2022-12-08 PROCEDURE — 86780 TREPONEMA PALLIDUM: CPT

## 2022-12-08 PROCEDURE — 86850 RBC ANTIBODY SCREEN: CPT

## 2022-12-08 PROCEDURE — 81420 FETAL CHRMOML ANEUPLOIDY: CPT

## 2022-12-08 PROCEDURE — 36415 COLL VENOUS BLD VENIPUNCTURE: CPT

## 2022-12-08 ASSESSMENT — EDINBURGH POSTNATAL DEPRESSION SCALE (EPDS)
I HAVE BEEN ANXIOUS OR WORRIED FOR NO GOOD REASON: NO, NOT AT ALL
I HAVE BEEN SO UNHAPPY THAT I HAVE BEEN CRYING: NO, NEVER
THE THOUGHT OF HARMING MYSELF HAS OCCURRED TO ME: NEVER
I HAVE FELT SCARED OR PANICKY FOR NO GOOD REASON: NO, NOT AT ALL
THINGS HAVE BEEN GETTING ON TOP OF ME: NO, I HAVE BEEN COPING AS WELL AS EVER
I HAVE BEEN ABLE TO LAUGH AND SEE THE FUNNY SIDE OF THINGS: AS MUCH AS I ALWAYS COULD
I HAVE BEEN SO UNHAPPY THAT I HAVE HAD DIFFICULTY SLEEPING: NOT AT ALL
I HAVE LOOKED FORWARD WITH ENJOYMENT TO THINGS: AS MUCH AS I EVER DID
I HAVE BLAMED MYSELF UNNECESSARILY WHEN THINGS WENT WRONG: NO, NEVER
TOTAL SCORE: 0
I HAVE FELT SAD OR MISERABLE: NO, NOT AT ALL

## 2022-12-08 ASSESSMENT — FIBROSIS 4 INDEX: FIB4 SCORE: 0.38

## 2022-12-08 NOTE — PROGRESS NOTES
Establish Pregnancy Visit    CC: First OB Visit    HPI: Patient is a 27 y.o.  at 12w4d who presents for her first OB visit.  She is not yet feeling fetal movement.  She denies vaginal bleeding, denies leakage of fluid, denies contractions.   She reports nausea/vomiting; denies headaches, or urinary symptoms.      DATING:   Estimated Date of Delivery: 23  by  8wkwk US    GYN HX:   Last Pap: 2021  Hx Moderate or Severe Dysplasia : no  Hx STD : no    OBSTETRIC HISTORY:  OB History    Para Term  AB Living   1 0 0 0 0 0   SAB IAB Ectopic Molar Multiple Live Births   0 0 0 0 0 0      # Outcome Date GA Lbr Yusuf/2nd Weight Sex Delivery Anes PTL Lv   1 Current                MEDICAL HISTORY:  Past Medical History:   Diagnosis Date    Active medical problems: none     Anxiety     Depression     IUD (intrauterine device) in place        MEDICATIONS:  Current Outpatient Medications on File Prior to Visit   Medication Sig Dispense Refill    Prenatal MV-Min-Fe Fum-FA-DHA (PRENATAL 1 PO) Take  by mouth.       No current facility-administered medications on file prior to visit.       FAMILY HISTORY:  Family History   Problem Relation Age of Onset    No Known Problems Mother     Heart Disease Father         50       SURGICAL HISTORY:  History reviewed. No pertinent surgical history.    ALLERGIES / REACTIONS:  No Known Allergies             SOCIAL HISTORY:   reports that she has never smoked. She has never used smokeless tobacco. She reports current alcohol use. She reports that she does not use drugs.    ROS:   Gen: no fevers or chills, no significant weight loss or gain, excessive fatigue  Respiratory:  no cough or dyspnea  Cardiac:  no chest pain, no palpitations, no syncope  Breast: no breast discharge, pain, lump or skin changes  GI:  no heartburn, no abdominal pain, no nausea or vomiting  Urinary: no dysuria, urgency, frequency, incontinence   Psych: no depression or anxiety  Neuro: no migraines with  aura, fainting spells, numbness or tingling  Extremities: no joint pain, persistently swollen ankles, recurrent leg cramps         PHYSICAL EXAMINATION:  Vital Signs:   Vitals:    12/08/22 0805   BP: 129/75   Weight: 123 lb     Body mass index is 21.11 kg/m².  Constitutional: The patient is well developed and well nourished.  Psychiatric: Patient is oriented to time place and person.   Skin: No rash observed.  Neck: Neck appears symmetric. There are no masses or adenopathy present.  Respiratory: normal effort  Abdomen: Soft, non-tender.  Extremeties: Legs are symmetric and without tenderness. There is no edema present.    ACOG SCREENING  Infection Prevention  1. High Risk For HIV: No 6. Rash Or Illness Since LMP: No     2. High Risk For Hepatitis B or C: No 7. History Of STD, GC, Chlamydia, HPV Syphilis: No     3. Live With Someone With TB Or Exposed To TB: No 8. Have a cat in the home?: No     4. Patient Or Partner Has A History Of Herpes: No      5. History of Chicken Pox: Yes (Comment: Child)             Genetic Screening/Teratology Counseling- Includes patient, baby's father, or anyone in either family with:  Patient's age 35 years or older as of estimated date of delivery: No     Thalassemia (Italian, Greek, Mediterranean, or  background): MCV less than 80: No     Neural tube defect (Meningomyelocele, Spina bifida, or Anencephaly): No     Congenital heart defect: No     Down syndrome: No     Bull-[place]achs (Ashkenazi Church, [place], [place]): No     Canavan disease ([place]kenazi Church): No     Familial dysautonomia ([employer]kenazi Church): No     Sickle cell disease or trait (): No     Hemophilia or other blood disorders: No     Muscular dystrophy: No    Cystic fibrosis: No     Bollinger's chorea: No     Mental retardation/autism: No     Other inherited genetic or chromosomal disorder: No     Maternal metabolic disorder (eg. Type 1 diabetes, PKU): No     Patient or baby's father had child with birth  defects not listed above: No     Recurrent pregnancy loss, or a stillbirth: No     Medications (including supplements, vitamins, herbs, or OTC drugs)/illicit/recreational drugs/alcohol since last menstrual period: No                 ASSESSMENT AND PLAN:  Patient Active Problem List    Diagnosis Date Noted    Supervision of normal first pregnancy 2022       27 y.o.  at 12w4d   - PNL ordered and std screening completed off urine  - Dating reviewed: Dated by  8wk US  - Order given for anatomy US  - Discussed options for genetic/aneuploidy testing and information given for pt to consider.  Advised to call insurance for cost of testing.           - she currently desires aneuploidy testing.  NIPT ordered           - she currently declines CF/SMA testing.  - Discussed recommendation for flu, Covid vaccine during pregnancy  - Discussed office policies, prenatal care timeline, weight gain, diet and activity.  - Taking PNV.  - Increase water intake and encouraged healthy nutrition. Encouraged moderate exercise may continue into final trimester.     Return in 4 weeks for next prenatal visit    Rosetta Cherry D.O.

## 2022-12-09 LAB
AMPHET CTO UR CFM-MCNC: NEGATIVE NG/ML
BARBITURATES CTO UR CFM-MCNC: NEGATIVE NG/ML
BENZODIAZ CTO UR CFM-MCNC: NEGATIVE NG/ML
CANNABINOIDS CTO UR CFM-MCNC: NEGATIVE NG/ML
COCAINE CTO UR CFM-MCNC: NEGATIVE NG/ML
DRUG COMMENT 753798: NORMAL
METHADONE CTO UR CFM-MCNC: NEGATIVE NG/ML
OPIATES CTO UR CFM-MCNC: NEGATIVE NG/ML
PCP CTO UR CFM-MCNC: NEGATIVE NG/ML
PROPOXYPH CTO UR CFM-MCNC: NEGATIVE NG/ML

## 2022-12-21 LAB
22Q112 DEL SYND Q0669: NORMAL
5P15.2 DEL BLD/T FISH: NORMAL
ANNOTATION COMMENT IMP: NORMAL
AS GENE MUT ANL BLD/T: NORMAL
DEL 1P36 SYND Q0208: NORMAL
EER FETAL ANEUPLOIDY W/MICRODELETIONS NL11653: NORMAL
FET CHR X + Y ANEUP RISK PLAS.CFDNA QN: NORMAL
FET SEX US: NORMAL
FET TS 13 RISK PLAS.CFDNA QL: NORMAL
FET TS 18 RISK PLAS.CFDNA QL: NORMAL
FET TS 21 RISK PLAS.CFDNA QL: NORMAL
FETAL ANEUPLOIDY - TRIPLOIDY NV11651: NORMAL
FETAL FRACTION Q0204: 8.8 %
GA (DAYS): NORMAL D
GA (WEEKS): NORMAL WK
NUMBER OF FETUSES Q0671: 1
PWS GENE MUT ANL BLD/T: NORMAL
REPORT FETAL SEX NL11652: YES

## 2023-01-09 ENCOUNTER — APPOINTMENT (OUTPATIENT)
Dept: OBGYN | Facility: CLINIC | Age: 28
End: 2023-01-09
Payer: COMMERCIAL

## 2023-01-13 ENCOUNTER — HOSPITAL ENCOUNTER (OUTPATIENT)
Dept: LAB | Facility: MEDICAL CENTER | Age: 28
End: 2023-01-13
Attending: OBSTETRICS & GYNECOLOGY
Payer: COMMERCIAL

## 2023-01-13 ENCOUNTER — ROUTINE PRENATAL (OUTPATIENT)
Dept: OBGYN | Facility: CLINIC | Age: 28
End: 2023-01-13
Payer: COMMERCIAL

## 2023-01-13 VITALS — BODY MASS INDEX: 21.28 KG/M2 | SYSTOLIC BLOOD PRESSURE: 126 MMHG | WEIGHT: 124 LBS | DIASTOLIC BLOOD PRESSURE: 65 MMHG

## 2023-01-13 DIAGNOSIS — Z34.01 ENCOUNTER FOR SUPERVISION OF NORMAL FIRST PREGNANCY IN FIRST TRIMESTER: ICD-10-CM

## 2023-01-13 PROCEDURE — 0502F SUBSEQUENT PRENATAL CARE: CPT | Performed by: OBSTETRICS & GYNECOLOGY

## 2023-01-13 PROCEDURE — 82105 ALPHA-FETOPROTEIN SERUM: CPT

## 2023-01-13 PROCEDURE — 36415 COLL VENOUS BLD VENIPUNCTURE: CPT

## 2023-01-13 ASSESSMENT — FIBROSIS 4 INDEX: FIB4 SCORE: 0.4

## 2023-01-13 NOTE — PROGRESS NOTES
OB Followup;    17w5d    Patient Active Problem List    Diagnosis Date Noted    Supervision of normal first pregnancy 2022       Vitals:    23 1117   BP: 126/65   Weight: 124 lb       Patient presents for followup of OB care. Currently doing well . Good fetal movement no leakage of fluid no contractions or vaginal bleeding        Size equals dates, normal fetal heart rate      Labs; ultrasound is scheduled,NIPS testing low risk female, prenatal labs normal  Patient given lab slip for maternal serum alpha-fetoprotein    Labor precautions given  Discussed proper weight gain during pregnancy.    Signs and symptoms of labor/ labor discussed   Discussed our group's policy on prenatal checkups and delivery  SMFM/ACOG/CDC recommend Covid vaccination for pregnant women-Covid infection during pregnancy results and worse maternal outcomes including greater need for mechanical ventilation and ICU admission and worse fetal outcomes including; possible FGR, increased risk of stillbirth  labor/delivery.  Discussed proper exercise during pregnancy  Discussed proper oral fluid hydration  Reviewed fetal kick counts and appropriate fetal movement during pregnancy  Reviewed postpartum birth control methods  All questions answered in detail    Followup in  4 weeks

## 2023-01-17 LAB
# FETUSES US: NORMAL
AFP MOM SERPL: 0.71
AFP SERPL-MCNC: 35 NG/ML
AGE - REPORTED: 27.9 YR
CURRENT SMOKER: NO
FAMILY MEMBER DISEASES HX: NO
GA METHOD: NORMAL
GA: NORMAL WK
IDDM PATIENT QL: NO
INTEGRATED SCN PATIENT-IMP: NORMAL
SPECIMEN DRAWN SERPL: NORMAL

## 2023-02-10 ENCOUNTER — HOSPITAL ENCOUNTER (OUTPATIENT)
Dept: RADIOLOGY | Facility: MEDICAL CENTER | Age: 28
End: 2023-02-10
Attending: OBSTETRICS & GYNECOLOGY
Payer: COMMERCIAL

## 2023-02-10 ENCOUNTER — ROUTINE PRENATAL (OUTPATIENT)
Dept: OBGYN | Facility: CLINIC | Age: 28
End: 2023-02-10
Payer: COMMERCIAL

## 2023-02-10 VITALS — SYSTOLIC BLOOD PRESSURE: 122 MMHG | WEIGHT: 124 LBS | DIASTOLIC BLOOD PRESSURE: 72 MMHG | BODY MASS INDEX: 21.28 KG/M2

## 2023-02-10 DIAGNOSIS — Z34.01 ENCOUNTER FOR SUPERVISION OF NORMAL FIRST PREGNANCY IN FIRST TRIMESTER: ICD-10-CM

## 2023-02-10 PROCEDURE — 76805 OB US >/= 14 WKS SNGL FETUS: CPT

## 2023-02-10 PROCEDURE — 0502F SUBSEQUENT PRENATAL CARE: CPT | Performed by: OBSTETRICS & GYNECOLOGY

## 2023-02-10 ASSESSMENT — FIBROSIS 4 INDEX: FIB4 SCORE: 0.4

## 2023-02-10 NOTE — PROGRESS NOTES
OB Followup;    21w5d    Patient Active Problem List    Diagnosis Date Noted    Supervision of normal first pregnancy 2022       Vitals:    02/10/23 1123   BP: 122/72   Weight: 124 lb       Patient presents for followup of OB care. Currently doing well . Good fetal movement no leakage of fluid no contractions or vaginal bleeding        Size equals dates, normal fetal heart rate      Labs; maternal serum alpha-fetoprotein test normal-discussed with patient  OB ultrasound fetal survey performed today results pending    Labor precautions given  Discussed proper weight gain during pregnancy.    Signs and symptoms of labor/ labor discussed   Discussed our group's policy on prenatal checkups and delivery  SMFM/ACOG/CDC recommend Covid vaccination for pregnant women-Covid infection during pregnancy results and worse maternal outcomes including greater need for mechanical ventilation and ICU admission and worse fetal outcomes including; possible FGR, increased risk of stillbirth  labor/delivery.  Discussed proper exercise during pregnancy  Discussed proper oral fluid hydration  Reviewed fetal kick counts and appropriate fetal movement during pregnancy  Reviewed postpartum birth control methods  All questions answered in detail    Followup in  4 weeks    
No

## 2023-03-10 ENCOUNTER — ROUTINE PRENATAL (OUTPATIENT)
Dept: OBGYN | Facility: CLINIC | Age: 28
End: 2023-03-10
Payer: COMMERCIAL

## 2023-03-10 VITALS — DIASTOLIC BLOOD PRESSURE: 65 MMHG | BODY MASS INDEX: 21.97 KG/M2 | SYSTOLIC BLOOD PRESSURE: 117 MMHG | WEIGHT: 128 LBS

## 2023-03-10 DIAGNOSIS — Z34.01 ENCOUNTER FOR SUPERVISION OF NORMAL FIRST PREGNANCY IN FIRST TRIMESTER: ICD-10-CM

## 2023-03-10 PROCEDURE — 0502F SUBSEQUENT PRENATAL CARE: CPT | Performed by: OBSTETRICS & GYNECOLOGY

## 2023-03-10 ASSESSMENT — FIBROSIS 4 INDEX: FIB4 SCORE: 0.4

## 2023-03-10 NOTE — PROGRESS NOTES
OB Followup;    25w5d    Patient Active Problem List    Diagnosis Date Noted    Supervision of normal first pregnancy 2022       Vitals:    03/10/23 0849   BP: 117/65   Weight: 128 lb       Patient presents for followup of OB care. Currently doing well . Good fetal movement no leakage of fluid no contractions or vaginal bleeding        Size equals dates, normal fetal heart rate      Labs; patient given lab slip for CBC, GGT and RPR  Results of fetal survey ultrasound are normal discussed with patient.  Patient feels well without complaints.  She would like to travel by commercial air at 30 weeks gestation we discussed that this should be fine.    Labor precautions given  Discussed proper weight gain during pregnancy.    Signs and symptoms of labor/ labor discussed   Discussed our group's policy on prenatal checkups and delivery  SMFM/ACOG/CDC recommend Covid vaccination for pregnant women-Covid infection during pregnancy results and worse maternal outcomes including greater need for mechanical ventilation and ICU admission and worse fetal outcomes including; possible FGR, increased risk of stillbirth  labor/delivery.  Discussed proper exercise during pregnancy  Discussed proper oral fluid hydration  Reviewed fetal kick counts and appropriate fetal movement during pregnancy  Reviewed postpartum birth control methods  All questions answered in detail    Followup in  4 weeks

## 2023-03-11 ENCOUNTER — HOSPITAL ENCOUNTER (OUTPATIENT)
Dept: LAB | Facility: MEDICAL CENTER | Age: 28
End: 2023-03-11
Attending: OBSTETRICS & GYNECOLOGY
Payer: COMMERCIAL

## 2023-03-11 DIAGNOSIS — Z34.01 ENCOUNTER FOR SUPERVISION OF NORMAL FIRST PREGNANCY IN FIRST TRIMESTER: ICD-10-CM

## 2023-03-11 LAB
BASOPHILS # BLD AUTO: 0.4 % (ref 0–1.8)
BASOPHILS # BLD: 0.03 K/UL (ref 0–0.12)
EOSINOPHIL # BLD AUTO: 0.18 K/UL (ref 0–0.51)
EOSINOPHIL NFR BLD: 2.4 % (ref 0–6.9)
ERYTHROCYTE [DISTWIDTH] IN BLOOD BY AUTOMATED COUNT: 44.6 FL (ref 35.9–50)
GLUCOSE 1H P 50 G GLC PO SERPL-MCNC: 139 MG/DL (ref 70–139)
HCT VFR BLD AUTO: 38.8 % (ref 37–47)
HGB BLD-MCNC: 12.4 G/DL (ref 12–16)
IMM GRANULOCYTES # BLD AUTO: 0.09 K/UL (ref 0–0.11)
IMM GRANULOCYTES NFR BLD AUTO: 1.2 % (ref 0–0.9)
LYMPHOCYTES # BLD AUTO: 1.58 K/UL (ref 1–4.8)
LYMPHOCYTES NFR BLD: 21.2 % (ref 22–41)
MCH RBC QN AUTO: 29.6 PG (ref 27–33)
MCHC RBC AUTO-ENTMCNC: 32 G/DL (ref 33.6–35)
MCV RBC AUTO: 92.6 FL (ref 81.4–97.8)
MONOCYTES # BLD AUTO: 0.51 K/UL (ref 0–0.85)
MONOCYTES NFR BLD AUTO: 6.8 % (ref 0–13.4)
NEUTROPHILS # BLD AUTO: 5.08 K/UL (ref 2–7.15)
NEUTROPHILS NFR BLD: 68 % (ref 44–72)
NRBC # BLD AUTO: 0 K/UL
NRBC BLD-RTO: 0 /100 WBC
PLATELET # BLD AUTO: 241 K/UL (ref 164–446)
PMV BLD AUTO: 10.2 FL (ref 9–12.9)
RBC # BLD AUTO: 4.19 M/UL (ref 4.2–5.4)
T PALLIDUM AB SER QL IA: NORMAL
WBC # BLD AUTO: 7.5 K/UL (ref 4.8–10.8)

## 2023-03-11 PROCEDURE — 86780 TREPONEMA PALLIDUM: CPT

## 2023-03-11 PROCEDURE — 85025 COMPLETE CBC W/AUTO DIFF WBC: CPT

## 2023-03-11 PROCEDURE — 36415 COLL VENOUS BLD VENIPUNCTURE: CPT

## 2023-03-11 PROCEDURE — 82950 GLUCOSE TEST: CPT

## 2023-03-30 ENCOUNTER — OFFICE VISIT (OUTPATIENT)
Dept: URGENT CARE | Facility: CLINIC | Age: 28
End: 2023-03-30
Payer: COMMERCIAL

## 2023-03-30 VITALS
TEMPERATURE: 98.1 F | SYSTOLIC BLOOD PRESSURE: 120 MMHG | RESPIRATION RATE: 16 BRPM | HEART RATE: 70 BPM | DIASTOLIC BLOOD PRESSURE: 60 MMHG | OXYGEN SATURATION: 95 % | WEIGHT: 134 LBS | HEIGHT: 64 IN | BODY MASS INDEX: 22.88 KG/M2

## 2023-03-30 DIAGNOSIS — J06.9 VIRAL URI WITH COUGH: ICD-10-CM

## 2023-03-30 DIAGNOSIS — Z20.822 EXPOSURE TO COVID-19 VIRUS: ICD-10-CM

## 2023-03-30 LAB
FLUAV RNA SPEC QL NAA+PROBE: NEGATIVE
FLUBV RNA SPEC QL NAA+PROBE: NEGATIVE
RSV RNA SPEC QL NAA+PROBE: NEGATIVE
SARS-COV-2 RNA RESP QL NAA+PROBE: NEGATIVE

## 2023-03-30 PROCEDURE — 87651 STREP A DNA AMP PROBE: CPT

## 2023-03-30 PROCEDURE — 99213 OFFICE O/P EST LOW 20 MIN: CPT | Mod: CS

## 2023-03-30 PROCEDURE — 0241U POCT CEPHEID COV-2, FLU A/B, RSV - PCR: CPT

## 2023-03-30 ASSESSMENT — ENCOUNTER SYMPTOMS
CHILLS: 0
FEVER: 0
SPUTUM PRODUCTION: 0
SINUS PAIN: 0
HEADACHES: 1
WHEEZING: 0
SORE THROAT: 1
STRIDOR: 0
COUGH: 1
HEMOPTYSIS: 0
SHORTNESS OF BREATH: 0

## 2023-03-30 NOTE — PROGRESS NOTES
Subjective:   Barb Valdez is a 27 y.o. female who presents for Cough (X 3 days, cough, chest congestion, some sore throat.  Exposure to COVID and 28 wks, pregnant.)      HPI: This is a 27-year-old female who presents today for COVID-19 testing.  Patient reports developing cough, sore throat, congestion, headache 2 days ago.  She reports that her  recently tested positive for COVID-19.  She reports that her symptoms are overall mild in nature.  She reports that she is otherwise healthy without underlying past medical conditions.  Patient is 28 weeks pregnant.  She is fully vaccinated for COVID-19.  She reports personal negative home COVID testing.  She denies wheezing or shortness of breath.  She has not taken anything for her symptoms.    Review of Systems   Constitutional:  Negative for chills, fever and malaise/fatigue.   HENT:  Positive for congestion and sore throat. Negative for ear discharge, ear pain and sinus pain.    Respiratory:  Positive for cough. Negative for hemoptysis, sputum production, shortness of breath, wheezing and stridor.    Neurological:  Positive for headaches.     Medications:    Current Outpatient Medications on File Prior to Visit   Medication Sig Dispense Refill    Prenatal MV-Min-Fe Fum-FA-DHA (PRENATAL 1 PO) Take  by mouth.       No current facility-administered medications on file prior to visit.        Allergies:   Patient has no known allergies.    Problem List:   Patient Active Problem List   Diagnosis    Supervision of normal first pregnancy        Surgical History:  No past surgical history on file.    Past Social Hx:   Social History     Tobacco Use    Smoking status: Never    Smokeless tobacco: Never   Vaping Use    Vaping Use: Never used   Substance Use Topics    Alcohol use: Yes     Alcohol/week: 0.0 oz     Comment: occ 1 every few months    Drug use: No          Problem list, medications, and allergies reviewed by myself today in Epic.     Objective:     BP  "120/60 (BP Location: Left arm, Patient Position: Sitting, BP Cuff Size: Adult)   Pulse 70   Temp 36.7 °C (98.1 °F) (Temporal)   Resp 16   Ht 1.626 m (5' 4\")   Wt 60.8 kg (134 lb)   LMP 09/04/2022 (Exact Date)   SpO2 95%   BMI 23.00 kg/m²     Physical Exam  Vitals and nursing note reviewed.   Constitutional:       General: She is not in acute distress.     Appearance: Normal appearance. She is normal weight. She is not ill-appearing, toxic-appearing or diaphoretic.   HENT:      Head: Normocephalic and atraumatic.      Right Ear: Tympanic membrane, ear canal and external ear normal. There is no impacted cerumen.      Left Ear: Tympanic membrane and ear canal normal. There is no impacted cerumen.      Nose: Congestion present.      Mouth/Throat:      Mouth: Mucous membranes are moist.      Pharynx: Oropharynx is clear. No oropharyngeal exudate or posterior oropharyngeal erythema.   Cardiovascular:      Rate and Rhythm: Normal rate and regular rhythm.      Pulses: Normal pulses.      Heart sounds: Normal heart sounds. No murmur heard.    No friction rub. No gallop.   Pulmonary:      Effort: Pulmonary effort is normal. No respiratory distress.      Breath sounds: Normal breath sounds. No stridor. No wheezing, rhonchi or rales.   Chest:      Chest wall: No tenderness.   Musculoskeletal:      Cervical back: Neck supple. No tenderness.   Lymphadenopathy:      Cervical: No cervical adenopathy.   Skin:     General: Skin is warm and dry.      Capillary Refill: Capillary refill takes less than 2 seconds.   Neurological:      General: No focal deficit present.      Mental Status: She is alert and oriented to person, place, and time. Mental status is at baseline.      Gait: Gait normal.   Psychiatric:         Mood and Affect: Mood normal.         Behavior: Behavior normal.         Thought Content: Thought content normal.         Judgment: Judgment normal.       Assessment/Plan:     Diagnosis and associated orders:     1. " Viral URI with cough  POCT CoV-2, Flu A/B, RSV by PCR      2. Exposure to COVID-19 virus           Results for orders placed or performed in visit on 03/30/23   POCT CoV-2, Flu A/B, RSV by PCR   Result Value Ref Range    SARS-CoV-2 by PCR Negative Negative, Invalid    Influenza virus A RNA Negative Negative, Invalid    Influenza virus B, PCR Negative Negative, Invalid    RSV, PCR Negative Negative, Invalid       Comments/MDM:   Pt is clinically stable at today's acute urgent care visit.  No acute distress noted. Appropriate for outpatient management at this time.     Acute problem.  Vital signs are stable in clinic today.  Viral testing for COVID, influenza, RSV are all negative.  [facility] I have discussed with patient that symptoms are viral in etiology. I have recommended supportive care to include; Increased fluids, rest, pregnancy safe over-the-counter cold and flu medications, alternating Tylenol per manufactures instructions for symptomatic relief and fevers, and the use of a humidifier. Patient is to return to  or go to ER for any new or worsening s/s, and follow up with PCP for recheck. Patient is agreeable with plan of care and verbalized good understanding.              Discussed DDx, management options (risks,benefits, and alternatives to planned treatment), natural progression and supportive care.  Expressed understanding and the treatment plan was agreed upon. Questions were encouraged and answered   Return to urgent care prn if new or worsening sx or if there is no improvement in condition prn.    Educated in Red flags and indications to immediately call 911 or present to the Emergency Department.   Advised the patient to follow-up with the primary care physician for recheck, reevaluation, and consideration of further management.    I personally reviewed prior external notes and test results pertinent to today's visit.  I have independently reviewed and  interpreted all diagnostics ordered during this urgent care acute visit.     Please note that this dictation was created using voice recognition software. I have made a reasonable attempt to correct obvious errors, but I expect that there are errors of grammar and possibly content that I did not discover before finalizing the note.    This note was electronically signed by BLAS Maravilla

## 2023-03-30 NOTE — LETTER
March 30, 2023    To Whom It May Concern:         This is confirmation that Barb Nicelinda José Miguel attended her scheduled appointment with SASHA Garduno on 3/30/23. Please excuse her from work 3/28/23-3/30/23.         If you have any questions please do not hesitate to call me at the phone number listed below.    Sincerely,          ROBB Garduno.  [phone]

## 2023-04-04 ENCOUNTER — TELEPHONE (OUTPATIENT)
Dept: OBGYN | Facility: CLINIC | Age: 28
End: 2023-04-04
Payer: COMMERCIAL

## 2023-04-05 ENCOUNTER — TELEPHONE (OUTPATIENT)
Dept: OBGYN | Facility: CLINIC | Age: 28
End: 2023-04-05
Payer: COMMERCIAL

## 2023-04-05 ENCOUNTER — ROUTINE PRENATAL (OUTPATIENT)
Dept: OBGYN | Facility: CLINIC | Age: 28
End: 2023-04-05
Payer: COMMERCIAL

## 2023-04-05 VITALS — SYSTOLIC BLOOD PRESSURE: 129 MMHG | BODY MASS INDEX: 23 KG/M2 | WEIGHT: 134 LBS | DIASTOLIC BLOOD PRESSURE: 82 MMHG

## 2023-04-05 DIAGNOSIS — Z34.01 ENCOUNTER FOR SUPERVISION OF NORMAL FIRST PREGNANCY IN FIRST TRIMESTER: ICD-10-CM

## 2023-04-05 PROCEDURE — 90715 TDAP VACCINE 7 YRS/> IM: CPT | Performed by: OBSTETRICS & GYNECOLOGY

## 2023-04-05 PROCEDURE — 0502F SUBSEQUENT PRENATAL CARE: CPT | Performed by: OBSTETRICS & GYNECOLOGY

## 2023-04-05 PROCEDURE — 90471 IMMUNIZATION ADMIN: CPT | Performed by: OBSTETRICS & GYNECOLOGY

## 2023-04-05 NOTE — TELEPHONE ENCOUNTER
Leanne from [employer] called requesting a breast pump prescription for this pt. Good fax number is [phone]

## 2023-04-05 NOTE — PROGRESS NOTES
OB Followup;    29w3d    Patient Active Problem List    Diagnosis Date Noted    Supervision of normal first pregnancy 2022       Vitals:    23 1527   BP: 129/82   Weight: 134 lb       Patient presents for followup of OB care. Currently doing well . Good fetal movement no leakage of fluid no contractions or vaginal bleeding        Size equals dates, normal fetal heart rate      Labs; CBC GTT RPR normal, Tdap today  Patient is taking a trip to [employer] in [place] next week    Labor precautions given  Discussed proper weight gain during pregnancy.    Signs and symptoms of labor/ labor discussed   Discussed our group's policy on prenatal checkups and delivery  SMFM/ACOG/CDC recommend Covid vaccination for pregnant women-Covid infection during pregnancy results and worse maternal outcomes including greater need for mechanical ventilation and ICU admission and worse fetal outcomes including; possible FGR, increased risk of stillbirth  labor/delivery.  Discussed proper exercise during pregnancy  Discussed proper oral fluid hydration  Reviewed fetal kick counts and appropriate fetal movement during pregnancy  Reviewed postpartum birth control methods  All questions answered in detail    Followup in  2 weeks

## 2023-04-05 NOTE — LETTER
"Count Your Baby's Movements  Another step to a healthy delivery    A Epic Dress Re Test             Dept: [phone]    How Many Weeks Pregnant? 29w3d    Date to Begin Countin23              How to use this chart    One way for your physician to keep track of your baby's health is by knowing how often the baby moves (or \"kicks\") in your womb.  You can help your physician to do this by using this chart every day.    Every day, you should see how many hours it takes for your baby to move 10 times.  Start in the morning, as soon as you get up.    First, write down the time your baby moves until you get to 10.  Check off one box every time your baby moves until you get to 10.  Write down the time you finished counting in the last column.  Total how long it took to count up all 10 movements.  Finally, fill in the box that shows how long this took.  After counting 10 movements, you no longer have to count any more that day.  The next morning, just start counting again as soon as you get up.    What should you call a \"movement\"?  It is hard to say, because it will feel different from one mother to another and from one pregnancy to the next.  The important thing is that you count the movements the same way throughout your pregnancy.  If you have more questions, you should ask your physician.    Count carefully every day!  SAMPLE:  Week 28    How many hours did it take to feel 10 movements?       Start  Time     1     2     3     4     5     6     7     8     9     10   Finish Time   Mon 8:20           11:40                  Fri               Sat               Sun                 IMPORTANT: You should contact your physician if it takes more than two hours for you to feel 10 movements.  Each morning, write down the time and start to count the movements of your baby.  Keep track by checking off one box every time you feel one movement.  When you have felt 10 \"kicks\", write down " the time you finished counting in the last column.  Then fill in the   box (over the check juana) for the number of hours it took.  Be sure to read the complete instructions on the previous page.

## 2023-04-06 ENCOUNTER — TELEPHONE (OUTPATIENT)
Dept: OBGYN | Facility: CLINIC | Age: 28
End: 2023-04-06

## 2023-04-06 RX ORDER — BREAST PUMP
EACH MISCELLANEOUS
Qty: 1 EACH | Refills: 0 | Status: SHIPPED
Start: 2023-04-06 | End: 2024-02-12

## 2023-04-24 ENCOUNTER — ROUTINE PRENATAL (OUTPATIENT)
Dept: OBGYN | Facility: CLINIC | Age: 28
End: 2023-04-24
Payer: COMMERCIAL

## 2023-04-24 VITALS — BODY MASS INDEX: 23.34 KG/M2 | DIASTOLIC BLOOD PRESSURE: 74 MMHG | SYSTOLIC BLOOD PRESSURE: 121 MMHG | WEIGHT: 136 LBS

## 2023-04-24 DIAGNOSIS — Z34.03 ENCOUNTER FOR SUPERVISION OF NORMAL FIRST PREGNANCY IN THIRD TRIMESTER: ICD-10-CM

## 2023-04-24 PROCEDURE — 0502F SUBSEQUENT PRENATAL CARE: CPT | Performed by: OBSTETRICS & GYNECOLOGY

## 2023-04-24 NOTE — PROGRESS NOTES
OB Followup;    32w1d    Patient Active Problem List    Diagnosis Date Noted    Supervision of normal first pregnancy 2022       Vitals:    23 0933   BP: 121/74   Weight: 136 lb       Patient presents for followup of OB care. Currently doing well . Good fetal movement no leakage of fluid no contractions or vaginal bleeding        Size equals dates, normal fetal heart rate          Labor precautions given  Discussed proper weight gain during pregnancy.    Signs and symptoms of labor/ labor discussed   Discussed our group's policy on prenatal checkups and delivery  SMFM/ACOG/CDC recommend Covid vaccination for pregnant women-Covid infection during pregnancy results and worse maternal outcomes including greater need for mechanical ventilation and ICU admission and worse fetal outcomes including; possible FGR, increased risk of stillbirth  labor/delivery.  Discussed proper exercise during pregnancy  Discussed proper oral fluid hydration  Reviewed fetal kick counts and appropriate fetal movement during pregnancy  Reviewed postpartum birth control methods  All questions answered in detail    Followup in  2 weeks

## 2023-05-08 ENCOUNTER — ROUTINE PRENATAL (OUTPATIENT)
Dept: OBGYN | Facility: CLINIC | Age: 28
End: 2023-05-08
Payer: COMMERCIAL

## 2023-05-08 VITALS — DIASTOLIC BLOOD PRESSURE: 83 MMHG | SYSTOLIC BLOOD PRESSURE: 127 MMHG | BODY MASS INDEX: 23.52 KG/M2 | WEIGHT: 137 LBS

## 2023-05-08 DIAGNOSIS — Z34.03 ENCOUNTER FOR SUPERVISION OF NORMAL FIRST PREGNANCY IN THIRD TRIMESTER: ICD-10-CM

## 2023-05-08 PROCEDURE — 0502F SUBSEQUENT PRENATAL CARE: CPT | Performed by: OBSTETRICS & GYNECOLOGY

## 2023-05-08 NOTE — PROGRESS NOTES
OB Followup;    34w1d    Patient Active Problem List    Diagnosis Date Noted    Supervision of normal first pregnancy 2022       Vitals:    23 1532   BP: 127/83   Weight: 137 lb       Patient presents for followup of OB care. Currently doing well . Good fetal movement no leakage of fluid no contractions or vaginal bleeding        Size equals dates, normal fetal heart rate          Labor precautions given  Discussed proper weight gain during pregnancy.    Signs and symptoms of labor/ labor discussed       Followup in  2 weeks

## 2023-05-21 ENCOUNTER — HOSPITAL ENCOUNTER (INPATIENT)
Facility: MEDICAL CENTER | Age: 28
LOS: 2 days | End: 2023-05-23
Attending: OBSTETRICS & GYNECOLOGY | Admitting: OBSTETRICS & GYNECOLOGY
Payer: COMMERCIAL

## 2023-05-21 DIAGNOSIS — Z78.9 BREASTFEEDING (INFANT): ICD-10-CM

## 2023-05-21 DIAGNOSIS — Z91.89 AT RISK FOR INEFFECTIVE BREASTFEEDING: ICD-10-CM

## 2023-05-21 LAB
BASOPHILS # BLD AUTO: 0.4 % (ref 0–1.8)
BASOPHILS # BLD: 0.04 K/UL (ref 0–0.12)
EOSINOPHIL # BLD AUTO: 0.23 K/UL (ref 0–0.51)
EOSINOPHIL NFR BLD: 2.4 % (ref 0–6.9)
ERYTHROCYTE [DISTWIDTH] IN BLOOD BY AUTOMATED COUNT: 41.9 FL (ref 35.9–50)
HCT VFR BLD AUTO: 41.3 % (ref 37–47)
HGB BLD-MCNC: 13.7 G/DL (ref 12–16)
HOLDING TUBE BB 8507: NORMAL
IMM GRANULOCYTES # BLD AUTO: 0.1 K/UL (ref 0–0.11)
IMM GRANULOCYTES NFR BLD AUTO: 1.1 % (ref 0–0.9)
LYMPHOCYTES # BLD AUTO: 2.77 K/UL (ref 1–4.8)
LYMPHOCYTES NFR BLD: 29.2 % (ref 22–41)
MCH RBC QN AUTO: 29.6 PG (ref 27–33)
MCHC RBC AUTO-ENTMCNC: 33.2 G/DL (ref 33.6–35)
MCV RBC AUTO: 89.2 FL (ref 81.4–97.8)
MONOCYTES # BLD AUTO: 0.84 K/UL (ref 0–0.85)
MONOCYTES NFR BLD AUTO: 8.9 % (ref 0–13.4)
NEUTROPHILS # BLD AUTO: 5.51 K/UL (ref 2–7.15)
NEUTROPHILS NFR BLD: 58 % (ref 44–72)
NRBC # BLD AUTO: 0 K/UL
NRBC BLD-RTO: 0 /100 WBC
PLATELET # BLD AUTO: 249 K/UL (ref 164–446)
PMV BLD AUTO: 10.7 FL (ref 9–12.9)
RBC # BLD AUTO: 4.63 M/UL (ref 4.2–5.4)
T PALLIDUM AB SER QL IA: NORMAL
WBC # BLD AUTO: 9.5 K/UL (ref 4.8–10.8)

## 2023-05-21 PROCEDURE — 59400 OBSTETRICAL CARE: CPT | Performed by: OBSTETRICS & GYNECOLOGY

## 2023-05-21 PROCEDURE — 304965 HCHG RECOVERY SERVICES

## 2023-05-21 PROCEDURE — 700101 HCHG RX REV CODE 250: Performed by: ADVANCED PRACTICE MIDWIFE

## 2023-05-21 PROCEDURE — 700105 HCHG RX REV CODE 258: Performed by: ADVANCED PRACTICE MIDWIFE

## 2023-05-21 PROCEDURE — 700111 HCHG RX REV CODE 636 W/ 250 OVERRIDE (IP): Performed by: ADVANCED PRACTICE MIDWIFE

## 2023-05-21 PROCEDURE — 700102 HCHG RX REV CODE 250 W/ 637 OVERRIDE(OP): Performed by: ADVANCED PRACTICE MIDWIFE

## 2023-05-21 PROCEDURE — 86780 TREPONEMA PALLIDUM: CPT

## 2023-05-21 PROCEDURE — 36415 COLL VENOUS BLD VENIPUNCTURE: CPT

## 2023-05-21 PROCEDURE — 770002 HCHG ROOM/CARE - OB PRIVATE (112)

## 2023-05-21 PROCEDURE — 85025 COMPLETE CBC W/AUTO DIFF WBC: CPT

## 2023-05-21 PROCEDURE — 59409 OBSTETRICAL CARE: CPT

## 2023-05-21 PROCEDURE — A9270 NON-COVERED ITEM OR SERVICE: HCPCS | Performed by: ADVANCED PRACTICE MIDWIFE

## 2023-05-21 RX ORDER — ACETAMINOPHEN 500 MG
1000 TABLET ORAL
Status: DISCONTINUED | OUTPATIENT
Start: 2023-05-21 | End: 2023-05-21 | Stop reason: HOSPADM

## 2023-05-21 RX ORDER — ACETAMINOPHEN 500 MG
1000 TABLET ORAL EVERY 6 HOURS PRN
Status: DISCONTINUED | OUTPATIENT
Start: 2023-05-21 | End: 2023-05-23 | Stop reason: HOSPADM

## 2023-05-21 RX ORDER — LIDOCAINE HYDROCHLORIDE 10 MG/ML
20 INJECTION, SOLUTION INFILTRATION; PERINEURAL
Status: COMPLETED | OUTPATIENT
Start: 2023-05-21 | End: 2023-05-21

## 2023-05-21 RX ORDER — IBUPROFEN 800 MG/1
800 TABLET ORAL
Status: DISCONTINUED | OUTPATIENT
Start: 2023-05-21 | End: 2023-05-21 | Stop reason: HOSPADM

## 2023-05-21 RX ORDER — OXYTOCIN 10 [USP'U]/ML
10 INJECTION, SOLUTION INTRAMUSCULAR; INTRAVENOUS
Status: DISCONTINUED | OUTPATIENT
Start: 2023-05-21 | End: 2023-05-21 | Stop reason: HOSPADM

## 2023-05-21 RX ORDER — SODIUM CHLORIDE, SODIUM LACTATE, POTASSIUM CHLORIDE, CALCIUM CHLORIDE 600; 310; 30; 20 MG/100ML; MG/100ML; MG/100ML; MG/100ML
INJECTION, SOLUTION INTRAVENOUS PRN
Status: DISCONTINUED | OUTPATIENT
Start: 2023-05-21 | End: 2023-05-23 | Stop reason: HOSPADM

## 2023-05-21 RX ORDER — DOCUSATE SODIUM 100 MG/1
100 CAPSULE, LIQUID FILLED ORAL 2 TIMES DAILY PRN
Status: DISCONTINUED | OUTPATIENT
Start: 2023-05-21 | End: 2023-05-23 | Stop reason: HOSPADM

## 2023-05-21 RX ORDER — TERBUTALINE SULFATE 1 MG/ML
0.25 INJECTION, SOLUTION SUBCUTANEOUS
Status: DISCONTINUED | OUTPATIENT
Start: 2023-05-21 | End: 2023-05-21 | Stop reason: HOSPADM

## 2023-05-21 RX ORDER — SODIUM CHLORIDE, SODIUM LACTATE, POTASSIUM CHLORIDE, CALCIUM CHLORIDE 600; 310; 30; 20 MG/100ML; MG/100ML; MG/100ML; MG/100ML
INJECTION, SOLUTION INTRAVENOUS CONTINUOUS
Status: DISCONTINUED | OUTPATIENT
Start: 2023-05-21 | End: 2023-05-21 | Stop reason: HOSPADM

## 2023-05-21 RX ORDER — IBUPROFEN 800 MG/1
800 TABLET ORAL EVERY 8 HOURS PRN
Status: DISCONTINUED | OUTPATIENT
Start: 2023-05-21 | End: 2023-05-23 | Stop reason: HOSPADM

## 2023-05-21 RX ORDER — OXYCODONE HYDROCHLORIDE 5 MG/1
5 TABLET ORAL 2 TIMES DAILY PRN
Status: DISCONTINUED | OUTPATIENT
Start: 2023-05-21 | End: 2023-05-23 | Stop reason: HOSPADM

## 2023-05-21 RX ORDER — ONDANSETRON 4 MG/1
4 TABLET, ORALLY DISINTEGRATING ORAL EVERY 6 HOURS PRN
Status: DISCONTINUED | OUTPATIENT
Start: 2023-05-21 | End: 2023-05-21 | Stop reason: HOSPADM

## 2023-05-21 RX ORDER — ONDANSETRON 2 MG/ML
4 INJECTION INTRAMUSCULAR; INTRAVENOUS EVERY 6 HOURS PRN
Status: DISCONTINUED | OUTPATIENT
Start: 2023-05-21 | End: 2023-05-21 | Stop reason: HOSPADM

## 2023-05-21 RX ORDER — ALUMINA, MAGNESIA, AND SIMETHICONE 2400; 2400; 240 MG/30ML; MG/30ML; MG/30ML
30 SUSPENSION ORAL EVERY 6 HOURS PRN
Status: DISCONTINUED | OUTPATIENT
Start: 2023-05-21 | End: 2023-05-21 | Stop reason: HOSPADM

## 2023-05-21 RX ADMIN — LIDOCAINE HYDROCHLORIDE 20 ML: 10 INJECTION, SOLUTION INFILTRATION; PERINEURAL at 04:45

## 2023-05-21 RX ADMIN — IBUPROFEN 800 MG: 800 TABLET, FILM COATED ORAL at 18:14

## 2023-05-21 RX ADMIN — ACETAMINOPHEN 1000 MG: 500 TABLET, FILM COATED ORAL at 07:54

## 2023-05-21 RX ADMIN — IBUPROFEN 800 MG: 800 TABLET, FILM COATED ORAL at 04:45

## 2023-05-21 RX ADMIN — OXYTOCIN 20 UNITS: 10 INJECTION, SOLUTION INTRAMUSCULAR; INTRAVENOUS at 04:46

## 2023-05-21 RX ADMIN — OXYTOCIN 125 ML/HR: 10 INJECTION, SOLUTION INTRAMUSCULAR; INTRAVENOUS at 05:42

## 2023-05-21 RX ADMIN — OXYCODONE 5 MG: 5 TABLET ORAL at 04:44

## 2023-05-21 ASSESSMENT — COPD QUESTIONNAIRES
IN THE PAST 12 MONTHS DO YOU DO LESS THAN YOU USED TO BECAUSE OF YOUR BREATHING PROBLEMS: DISAGREE/UNSURE
DURING THE PAST 4 WEEKS HOW MUCH DID YOU FEEL SHORT OF BREATH: NONE/LITTLE OF THE TIME
DO YOU EVER COUGH UP ANY MUCUS OR PHLEGM?: NO/ONLY WITH OCCASIONAL COLDS OR INFECTIONS
HAVE YOU SMOKED AT LEAST 100 CIGARETTES IN YOUR ENTIRE LIFE: NO/DON'T KNOW

## 2023-05-21 ASSESSMENT — EDINBURGH POSTNATAL DEPRESSION SCALE (EPDS)
I HAVE LOOKED FORWARD WITH ENJOYMENT TO THINGS: AS MUCH AS I EVER DID
I HAVE BEEN SO UNHAPPY THAT I HAVE HAD DIFFICULTY SLEEPING: NOT AT ALL
I HAVE BEEN ANXIOUS OR WORRIED FOR NO GOOD REASON: YES, SOMETIMES
I HAVE BLAMED MYSELF UNNECESSARILY WHEN THINGS WENT WRONG: NO, NEVER
THE THOUGHT OF HARMING MYSELF HAS OCCURRED TO ME: NEVER
I HAVE FELT SAD OR MISERABLE: NO, NOT AT ALL
THINGS HAVE BEEN GETTING ON TOP OF ME: NO, I HAVE BEEN COPING AS WELL AS EVER
I HAVE BEEN ABLE TO LAUGH AND SEE THE FUNNY SIDE OF THINGS: AS MUCH AS I ALWAYS COULD
I HAVE FELT SCARED OR PANICKY FOR NO GOOD REASON: NO, NOT MUCH
I HAVE BEEN SO UNHAPPY THAT I HAVE BEEN CRYING: NO, NEVER

## 2023-05-21 ASSESSMENT — LIFESTYLE VARIABLES
ALCOHOL_USE: NO
DOES PATIENT WANT TO STOP DRINKING: NO
EVER HAD A DRINK FIRST THING IN THE MORNING TO STEADY YOUR NERVES TO GET RID OF A HANGOVER: NO
EVER_SMOKED: NEVER
HAVE YOU EVER FELT YOU SHOULD CUT DOWN ON YOUR DRINKING: NO
CONSUMPTION TOTAL: INCOMPLETE
HAVE PEOPLE ANNOYED YOU BY CRITICIZING YOUR DRINKING: NO
TOTAL SCORE: 0
TOTAL SCORE: 0
EVER FELT BAD OR GUILTY ABOUT YOUR DRINKING: NO
TOTAL SCORE: 0

## 2023-05-21 ASSESSMENT — PATIENT HEALTH QUESTIONNAIRE - PHQ9
SUM OF ALL RESPONSES TO PHQ9 QUESTIONS 1 AND 2: 0
2. FEELING DOWN, DEPRESSED, IRRITABLE, OR HOPELESS: NOT AT ALL
1. LITTLE INTEREST OR PLEASURE IN DOING THINGS: NOT AT ALL

## 2023-05-21 ASSESSMENT — PAIN DESCRIPTION - PAIN TYPE
TYPE: ACUTE PAIN

## 2023-05-21 NOTE — PROGRESS NOTES
Received report from Kathryn LAMBERT. Educated pt on today's POC. All questions answered at this time. Call light within reach.     0900 Educated MOB and FOB on all things in regards to feeding baby. Latched baby with MOB. Infant sleepy. Helped MOB hand express 1 mL and fed directly to baby.    1200 educated MOB and FOB on paced bottle feeding.     1500 Assisted MOB in latching baby. Baby sleepy but was able to latch and get a few sucks. MOB fed baby 1 mL that she pumped from her noon session. MOB fed infant 10 mL donor breast milk

## 2023-05-21 NOTE — L&D DELIVERY NOTE
Admit Date:   2023      Pregnancy Complications: premature labor  Medical Induction of Labor: no  GBS: unknown, untreated  Anesthesia: none  Gestational Age at delivery: 36.0    Patient  progressed quickly and noted to be C/+1. Felt urge to push and pushed well to +4 station.RT at bedside for assistance if appropriate. Fetal bradycardia encountered and consented patient for episiotomy. Unable to secure lidocaine prior to episiotomy and midline episiotomy was performed. Patient then pushed to deliver deliver viable female infant in MARTINE position at 0329 with coached pushing efforts. Infant placed to maternal abdomen where she was dried and stimulated. A spontaneous cry was noted. Apgar 8, 9      Pitocin infused via IV bolus. After delayed cord clamping, cord was doubly clamped and cut by father. Signs of placenta separation noted and gentle cord traction was completed. Intact placenta was delivered spontaneously at 0337 where 3VC was noted. EBL 300ml. Fundus firm with minimal massage. Inspection of vulva and vagina was completed and midline episiotomy was noted as second degree laceration. This was repaired in usual fashion with 3-0 and 4-0 vicryl. Careful inspection was made to ensure there was NO 3rd degree laceration. Good hemostasis and approximation was achieved. Routine postpartum care was initiated as mother and infant stable. Infant weight is pending    Delivery complications: precipitous birth, terminal bradycardia        Jayjay ODONNELL CNM/ Dr. Christine, Attending

## 2023-05-21 NOTE — DISCHARGE PLANNING
Discharge Planning Assessment Post Partum    Reason for Referral: History of anxiety and depression  Address: [address] [place] [address] [place] [address]  Phone: [phone]  Type of Living Situation: living with FOB  Mom Diagnosis: Pregnancy  Baby Diagnosis: -36 weeks  Primary Language: English    Name of Baby: Ramonita Valdez (: 23)  Father of the Baby: Fam Valdez  Involved in baby’s care? Yes  Contact Information: [phone]    Prenatal Care: Yes  Mom's PCP: Dr. Gimenez  PCP for new baby: Dr. 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  MOB scored a 3 on the EPDS screen.  Discussed with mother and provided a list of counseling and support group resources specializing in post partum depression  Domestic Violence History: No  Drug/ETOH History: No    Resources Provided: post partum support and counseling resources provided  Referrals Made: None     Clearance for Discharge: Infant is cleared to discharge home with parents once medically cleared

## 2023-05-21 NOTE — PROGRESS NOTES
26 yo, , MAYRA , GA 36, GBS unknown     Pt presents to [facility] 3 c/o SROM at 0130 and continues to leak fluid. Pt states she woke up in a pile of fluid. Reports occasional cramping and +FM. Denies VB. Lopes OGNP at bedside. SSE performed, pt is grossly ruptured with clear fluid. SVE /0. Admission orders received.    0240: Report given to Debby LAMBERT.

## 2023-05-21 NOTE — PROGRESS NOTES
0244: Pt oriented to labor room; monitors on x2.  0300: Pt c/o pressure and urge to have a BM.   W/ pt permission, this RN SVE complete.  0301: This Rn called BHARATHI Lopes to bedside for delivery.  0306: BHARATHI Lopes at bedside for delivery.  0329: Delivery of viable female infant; APGARS 8/9.  0440: Pt up to bathroom; voided adequately.  0505: Pt transported to postpartum unit in stable condition, via wheelchair; infant in arms; FOB at side.

## 2023-05-21 NOTE — CONSULTS
Initial Visit:    MITZI, , delivered her baby this morning at 0329 at 36 weeks gestation.  Infant is a late  infant.  There are no known risk factors for breastfeeding.      History of BF: None.  This is MITZI's first baby.    Report of Current Breastfeeding Status: MOB reported infant has made attempts to latch onto the breast to feed, but does not stay awake long enough to latch onto the breast to feed.    Breastfeeding Assistance: Demonstrated positioning, wedging of the breast and angle of latch with baby in the laid back and football hold position at the right breast.  Infant was provided with drops of colostrum and made 2-3 brief attempts to latch onto the breast to feed, but then fell asleep after placing her mouth on MOB's breast.    Three step feeding plan initiated due to sub-optimal latch. This plan consists of: 1) offering infant the breast first at every feed with latch attempts not to exceed more than 10 minutes in length; 2) supplementation with donor breast milk per hospital supplementation guidelines (MOB was provided with a copy of these guidelines along with instructions on use); and pumping (followed by 2-3 minutes of hand expression at each breast immediately afterwards) as instructed.    RN, David Luis, provided infant with donor breast milk and taught FOB how to perform paced bottle feeding.     set MOB with a hospital grade double electric breast pump.  She was provided with verbal/written information on pump assembly, pump operation, and cleaning of pump parts.  Fit of 25 mm flanges appeared to fit each breast appropriately.  Pump settings reviewed and were: 80 CPM down to 60 after 2 minutes/suction set to comfort at 30%/pumps for 15 minutes.    Demonstrated hand expression at the right breast.  Approximately 2 clear colored drops of colostrum was expelled from the breast.    Provided education on the difference between a hospital grade breast pump and a personal breast pump  [Dear  ___] : Dear  [unfilled], [Consult Letter:] : I had the pleasure of evaluating your patient, [unfilled]. in growing and protecting her milk supply.  MOB stated she has a [employer]ra personal breast pump for home use, but is open to renting a hospital grade breast pump at discharge.    Provided education on: skin to skin, frequency of breastfeeds, hunger cues, and milk production.    Plan: Continue to follow three step feeding plan as described above.    MOB was provided with the following hand-outs:  [facility] LPI  Your Late  Infant  CDC Milk Storage Guidelines  Hospital Grade Breast Pump Rentals  [facility] Breastfeeding Resource Sheet    MOB verbalized understanding of all information provided to her and denied having any lactation questions and/or concerns at this time.  She was encouraged to call for lactation support as needed.    [Please see my note below.] : Please see my note below. [Consult Closing:] : Thank you very much for allowing me to participate in the care of this patient.  If you have any questions, please do not hesitate to contact me. [Sincerely,] : Sincerely, [FreeTextEntry3] : Jason Brunner M.D.\par Surgery of the Hand & Upper Extremity\par Orthopaedic Surgery\par Chief, Hand Service, [facility] [place]\par Director, Hand Service, [facility]\par  of Orthopedic Surgery, [employer] at [facility] \par [facility]\par \par Email: [email or web address] Office Phone: [phone]\par \par

## 2023-05-21 NOTE — PROGRESS NOTES
Pt arrived to room 311 from L&D via wheelchair transport. Transferred to bed independently. Bedside report received from L&D RN. Lochia light without clots expressed, fundus firm midline, 2nd bag pitocin infusing at 125ml/hr. Pt and SO oriented to room and unit, pain management options, mariam care and pad changes, updated on plan of care and safety precautions. Questions answered and pt verbalizes understanding. Siderails up x2, bed level down, call light within reach, no further questions or concerns at this time.

## 2023-05-21 NOTE — ED PROVIDER NOTES
Emergency Obstetric Consultation     Date of Service  2023    Reason for Consultation  No chief complaint on file.      History of Presenting Illness  27 y.o. female who presented 2023 with Reason for consult: rupture of membranesContractions: Onset was less than 1 hour ago.    Frequency is approximately 30 seconds.  Perceived severity is mild.    Fetal activity: Perceived fetal activity is normal.    Prenatal complications: no prenatal complications    .    Review of Systems  Review of Systems   All other systems reviewed and are negative.      Obstetric History    OB History    Para Term  AB Living   1 0 0 0 0 0   SAB IAB Ectopic Molar Multiple Live Births   0 0 0 0 0 0      # Outcome Date GA Lbr Yusuf/2nd Weight Sex Delivery Anes PTL Lv   1 Current                Gynecologic History  Patient's last menstrual period was 2022 (exact date).    Medical History  Past Medical History:   Diagnosis Date    Active medical problems: none     Anxiety     Depression     IUD (intrauterine device) in place        Surgical History   has no past surgical history on file.    Family History  family history includes Heart Disease in her father; No Known Problems in her mother.    Social History   reports that she has never smoked. She has never used smokeless tobacco. She reports current alcohol use. She reports that she does not use drugs.    Medications  Medications Prior to Admission   Medication Sig Dispense Refill Last Dose    Misc. Devices (BREAST PUMP) Misc For breastfeeding difficulty or milk storage 1 Each 0     Prenatal MV-Min-Fe Fum-FA-DHA (PRENATAL 1 PO) Take  by mouth.          Allergies  No Known Allergies    Physical Exam  Maternal Exam:  Uterine Assessment: Contraction strength is moderate.  Contraction duration is 40 seconds. Contraction frequency is regular.   Abdomen: Patient reports no abdominal tenderness. Fetal presentation: vertex  Introitus: Normal vulva. Normal vagina.   Amniotic fluid character: clear.  Pelvis: adequate for delivery.    Cervix: Cervix evaluated by digital exam.    7/100/0Baseline rate: 150.   Variability: moderate (6-25 bpm).    Fetal State Assessment: Category I - tracings are normal.  Genitourinary:     General: Normal vulva.         Laboratory               No results for input(s): NTPROBNP in the last 72 hours.         Assessment & Plan  Assessment:  Active phase labor.   Membrane status: SROM.   Fetal well-being: normal.   1. 28 yo  with IUP at 36 weeks  2. Cat I FHR tracing  3. PPROM    Plan:  1. Admit to L & D          ALAN Jha

## 2023-05-21 NOTE — H&P
Admission History and Physical      Barb Valdez is a 27 y.o. female  at 36w0d who presents for leaking fluid    Subjective:   unknown  For CTXS.   positive - cramping in lower abdomen Feels pain   positive for LOF  negative for vaginal bleeding.   positive for fetal movement    She reports she woke up to fluid in her bed around 1 hour prior to evaluation.     ROS: Pertinent items are noted in HPI.    Past Medical History:   Diagnosis Date    Active medical problems: none     Anxiety     Depression     IUD (intrauterine device) in place      No past surgical history on file.  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   Tobacco Use    Smoking status: Never    Smokeless tobacco: Never   Vaping Use    Vaping Use: Never used   Substance and Sexual Activity    Alcohol use: Yes     Alcohol/week: 0.0 oz     Comment: occ 1 every few months    Drug use: No    Sexual activity: Yes     Partners: Male     Birth control/protection: Condom   Other Topics Concern    Not on file   Social History Narrative    Not on file     Social Determinants of Health     Financial Resource Strain: Not on file   Food Insecurity: Not on file   Transportation Needs: Not on file   Physical Activity: Not on file   Stress: Not on file   Social Connections: Not on file   Intimate Partner Violence: Not on file   Housing Stability: Not on file     Allergies: Patient has no known allergies.    Current Facility-Administered Medications:     LR infusion, , Intravenous, Continuous, Jayjay Cerrato  PAOLANP    lidocaine (XYLOCAINE) 1%  injection, 20 mL, Subcutaneous, Once PRN, ALAN Jha    terbutaline (BRETHINE) injection 0.25 mg, 0.25 mg, Subcutaneous, Once PRN, ALAN Jha    oxytocin (Pitocin) infusion bolus (for post delivery), 20 Units, Intravenous, Once **FOLLOWED BY** oxytocin (Pitocin) infusion (for post delivery), 125 mL/hr, Intravenous, Continuous, ALAN Jha    oxytocin (PITOCIN) injection 10 Units, 10 Units, Intramuscular, Once PRN, ALAN Jha    ibuprofen (MOTRIN) tablet 800 mg, 800 mg, Oral, Once PRN, ALAN Jha    acetaminophen (TYLENOL) tablet 1,000 mg, 1,000 mg, Oral, Once PRN, ALAN Jha    fentaNYL (SUBLIMAZE) injection 50 mcg, 50 mcg, Intravenous, Q HOUR PRN **OR** fentaNYL (SUBLIMAZE) injection 100 mcg, 100 mcg, Intravenous, Q HOUR PRN, ALAN Jha    penicillin G potassium 5 Million Units in  mL IVPB, 5 Million Units, Intravenous, Once **FOLLOWED BY** penicillin G potassium 2.5 million units in  mL IVPB, 2.5 Million Units, Intravenous, Q4HRS, ALAN Jha    ondansetron (ZOFRAN ODT) dispertab 4 mg, 4 mg, Oral, Q6HRS PRN **OR** ondansetron (ZOFRAN) syringe/vial injection 4 mg, 4 mg, Intravenous, Q6HRS PRN, ALAN Jha    mag hydrox-al hydrox-simeth (MAALOX PLUS ES or MYLANTA DS) suspension 30 mL, 30 mL, Oral, Q6HRS PRN, ALAN Jha    [facility] with [facility] starting at 14 weeks with following problems:  Patient Active Problem List    Diagnosis Date Noted    Labor and delivery indication for care or intervention 05/21/2023    Supervision of normal first pregnancy 12/08/2022       Last US at 20 weeks  2/10/2023 8:45 AM     HISTORY/REASON FOR EXAM:  Evaluate fetal anatomy.     TECHNIQUE/EXAM DESCRIPTION: OB complete ultrasound.     COMPARISON:  None     FINDINGS:  Fetal Lie:  Vertex  LMP:  9/11/2022  Clinical MAYRA by LMP:  6/18/2023     Placenta (Location):   "Posterior  Placenta Previa: No  Placental ndGndrndanddndend:nd nd2nd Amniotic Fluid Volume:  AMANDA = 12.7 cm     Fetal Heart Rate:  140 bpm     Cervical Length:  3.56 cm     No maternal adnexal mass is identified.     Umbilical Artery S/D Ratio(s):  Not applicable     Fetal Anatomy  (Seen or Not Seen)  Lateral Ventricles     Seen  Cisterna Magna        Seen  Cerebellum              Seen  CSP             Seen  Orbits             Seen  Face/Lips                Seen  Cord Insertion         Seen  Placental CI         Seen  4 Chamber Heart     Seen  LVOT               Seen  RVOT              Seen  3 Vessel View     Seen  Stomach       Seen  Kidneys                   Seen  Urinary Bladder      Seen  Spine                       Seen  3 Vessel Cord          Seen  Both Upper Extremities    Seen  Both Lower Extremities    Seen  Diaphragm             Seen  Movement       Seen  Gender:  Likely female     Fetal Biometry  BPD    5.22 cm, 21 weeks 6 days, (53%)  HC    19.07 cm, 21 weeks 2 days, (24%)  AC    16.54 cm, 21 weeks 4 days, (38%)  Femur Length    3.8 cm, 22 weeks 1 day, (53%)  Humerus Length    3.40 cm, 21 weeks 4 days, (33%)  Cerebellum Diameter   2.16 cm     EGA by this US:  21 weeks 5 days  MAYRA by this US: 6/18/2023  MAYRA by 1st US:  6/18/2023     Estimated Fetal Weight:  449 g  EFW Percentile: 47%     Comments:  Size equal dates     IMPRESSION:     Single intrauterine pregnancy of an estimated gestational age of 21 weeks 5 days with an estimated date of delivery of 6/18/2023.     Fetal survey within normal limits.        Objective:      /87   Pulse 85   Temp 36.7 °C (98.1 °F) (Temporal)   Resp 18   Ht 1.626 m (5' 4\")   Wt 62.1 kg (137 lb)   SpO2 98%     General:   no acute distress, alert, cooperative   Skin:   normal   HEENT:  PERRLA   Lungs:   CTA bilateral   Heart:   S1, S2 normal, no murmur, click, rub or gallop, regular rate and rhythm, brisk carotid upstroke without bruits, peripheral pulses very brisk, chest is " clear without rales or wheezing, no pedal edema   Abdomen:   gravid, NT   EFW:  2900g   Pelvis:  adequate, diagnonal conjugate not met   FHT:  155 BPM   Uterine Size: S=D   Presentations: Cephalic   Cervix:     Dilation: 7    Effacement: 100%    Station:  0    Consistency: Soft    Position: Anterior     Lab Review  Lab:   Blood type: A     Recent Results (from the past 5880 hour(s))   PREG CNTR PRENATAL PN    Collection Time: 12/08/22  9:29 AM   Result Value Ref Range    WBC 7.1 4.8 - 10.8 K/uL    RBC 4.53 4.20 - 5.40 M/uL    Hemoglobin 13.5 12.0 - 16.0 g/dL    Hematocrit 40.4 37.0 - 47.0 %    MCV 89.2 81.4 - 97.8 fL    MCH 29.8 27.0 - 33.0 pg    MCHC 33.4 (L) 33.6 - 35.0 g/dL    RDW 40.0 35.9 - 50.0 fL    Platelet Count 312 164 - 446 K/uL    MPV 10.0 9.0 - 12.9 fL    Hepatitis C Antibody Non-Reactive Non-Reactive    Hepatitis B Surface Antigen Non-Reactive Non-Reactive    Rubella IgG Antibody 17.60 IU/mL    Syphilis, Treponemal Qual Non-Reactive Non-Reactive   URINE DRUG SCREEN W/CONF (AR)    Collection Time: 12/08/22  9:29 AM   Result Value Ref Range    Urine Amphetamine-Methamphetam Negative Cutoff 300 ng/mL    Barbiturates Negative Cutoff 200 ng/mL    Benzodiazepines Negative Cutoff 200 ng/mL    Propoxyphene Negative Cutoff 300 ng/mL    Cocaine Metabolite Negative Cutoff 150 ng/mL    Methadone Negative Cutoff 150 ng/mL    Codeine-Morphine Negative Cutoff 300 ng/mL    Phencyclidine -Pcp Negative Cutoff 25 ng/mL    Cannabinoid Metab Negative Cutoff 20 ng/mL    Drug Comment Urine Drugs See Note    Chlamydia/GC, PCR (Urine)    Collection Time: 12/08/22  9:29 AM    Specimen: Genital   Result Value Ref Range    C. trachomatis by PCR Negative Negative    Gc By Dna Probe Negative Negative    Source Urine    URINE CULTURE(NEW)    Collection Time: 12/08/22  9:29 AM    Specimen: Urine   Result Value Ref Range    Significant Indicator POS (POS)     Source UR     Site Clean Catch     Culture Result - (A)     Culture Result  Enterococcus faecalis  10-50,000 cfu/mL   (A)        Susceptibility    Enterococcus faecalis - ALEKSANDR     Daptomycin 1 Sensitive mcg/mL     Nitrofurantoin <=32 Sensitive mcg/mL     Ampicillin <=2 Sensitive mcg/mL     Vancomycin 1 Sensitive mcg/mL     Penicillin 2 Sensitive mcg/mL     Tetracycline >8 Resistant mcg/mL   HIV AG/AB COMBO ASSAY SCREENING    Collection Time: 12/08/22  9:29 AM   Result Value Ref Range    HIV Ag/Ab Combo Assay Non-Reactive Non Reactive   OP Prenatal Panel-Blood Bank    Collection Time: 12/08/22  9:29 AM   Result Value Ref Range    ABO Grouping Only A     Rh Grouping Only POS     Antibody Screen Scrn NEG    NIPT Fetal Aneuploidy Screen w/Microdeletions    Collection Time: 12/08/22  9:31 AM   Result Value Ref Range    Fetal Aneuploidy - Final Results Summary See Notes     Fetal Sex Female     Fetal Fraction 8.8 %    Trisomy 21 Low Risk     Trisomy 18 Low Risk     Trisomy 13 Low Risk     Monosomy X Low Risk     Fetal Aneuploidy - Triploidy Low Risk     22q11.2 Deletion Syndrome Low Risk     Prader-Willi syndrome Low Risk     Cri-du-chat syndrome Low Risk     Angelman syndrome Low Risk     del 1p36 syndrome Low Risk     Gestational Age At Time Of Draw (Weeks) 12 Weeks     Gestational Age at Draw (days) 4 Days     Maternal Weight (pounds) 123 lbs     Number of Fetuses 1     Report Fetal Sex Yes     EER Fetal Aneuploidy W/Microdeletions See Note    AFP MATERNAL SERUM ALPHA-FETOPROTEIN    Collection Time: 01/13/23  3:55 PM   Result Value Ref Range    AFP Value -Eia 35 ng/mL    AFP MOM Value 0.71     Interpretation Screen Neg     M[age] MAYRA 27[age]9 yr    Maternal Weight 124.0 lbs.     Gest. Age on Collection Date 17 wks, 5 days     Gestational Age Based On Ultrasound     Multiple Pregnancy Bruner     Race Nonblack     Insulin Dependent Diabetes No     Smoking No     Family Hx NTD No     Specimen See Note    CBC WITH DIFFERENTIAL    Collection Time: 03/11/23  8:29 AM   Result Value Ref Range     WBC 7.5 4.8 - 10.8 K/uL    RBC 4.19 (L) 4.20 - 5.40 M/uL    Hemoglobin 12.4 12.0 - 16.0 g/dL    Hematocrit 38.8 37.0 - 47.0 %    MCV 92.6 81.4 - 97.8 fL    MCH 29.6 27.0 - 33.0 pg    MCHC 32.0 (L) 33.6 - 35.0 g/dL    RDW 44.6 35.9 - 50.0 fL    Platelet Count 241 164 - 446 K/uL    MPV 10.2 9.0 - 12.9 fL    Neutrophils-Polys 68.00 44.00 - 72.00 %    Lymphocytes 21.20 (L) 22.00 - 41.00 %    Monocytes 6.80 0.00 - 13.40 %    Eosinophils 2.40 0.00 - 6.90 %    Basophils 0.40 0.00 - 1.80 %    Immature Granulocytes 1.20 (H) 0.00 - 0.90 %    Nucleated RBC 0.00 /100 WBC    Neutrophils (Absolute) 5.08 2.00 - 7.15 K/uL    Lymphs (Absolute) 1.58 1.00 - 4.80 K/uL    Monos (Absolute) 0.51 0.00 - 0.85 K/uL    Eos (Absolute) 0.18 0.00 - 0.51 K/uL    Baso (Absolute) 0.03 0.00 - 0.12 K/uL    Immature Granulocytes (abs) 0.09 0.00 - 0.11 K/uL    NRBC (Absolute) 0.00 K/uL   T.PALLIDUM AB JAN (SCREENING)    Collection Time: 23  8:29 AM   Result Value Ref Range    Syphilis, Treponemal Qual Non-Reactive Non-Reactive   GLUCOSE 1HR GESTATIONAL    Collection Time: 23  8:29 AM   Result Value Ref Range    Glucose, Post Dose 139 70 - 139 mg/dL   POCT CoV-2, Flu A/B, RSV by PCR    Collection Time: 23  8:37 AM   Result Value Ref Range    SARS-CoV-2 by PCR Negative Negative, Invalid    Influenza virus A RNA Negative Negative, Invalid    Influenza virus B, PCR Negative Negative, Invalid    RSV, PCR Negative Negative, Invalid          Assessment:   Barb Sullivan José Miguel at 36w0d  Labor status: SROM and Active phase labor.  Obstetrical history significant for   Patient Active Problem List    Diagnosis Date Noted    Labor and delivery indication for care or intervention 2023    Supervision of normal first pregnancy 2022   .      Plan:     1. Admit to L&D  2. GBS unknown start antibiotics per protocol as  and GBS unknown. Rapdi sent  3. Desires epidural for pain relief. May have when desired   4. Plan at this time is  expectant management . Consider pitocin for augmentation if appropriate.       CFeaster BHARATHI/ Dr. Christine Attending

## 2023-05-21 NOTE — CARE PLAN
The patient is Stable - Low risk of patient condition declining or worsening    Shift Goals  Clinical Goals: pain control, light lochia  Patient Goals: bond, breast feed  Family Goals: bond    Progress made toward(s) clinical / shift goals:    Problem: Knowledge Deficit - Postpartum  Goal: Patient will verbalize and demonstrate understanding of self and infant care  Outcome: Progressing     Problem: Psychosocial - Postpartum  Goal: Patient will verbalize and demonstrate effective bonding and parenting behavior  Outcome: Progressing       Patient is not progressing towards the following goals:

## 2023-05-22 PROBLEM — Z34.00 SUPERVISION OF NORMAL FIRST PREGNANCY: Status: RESOLVED | Noted: 2022-12-08 | Resolved: 2023-05-22

## 2023-05-22 LAB
BASOPHILS # BLD AUTO: 0.7 % (ref 0–1.8)
BASOPHILS # BLD: 0.07 K/UL (ref 0–0.12)
EOSINOPHIL # BLD AUTO: 0.43 K/UL (ref 0–0.51)
EOSINOPHIL NFR BLD: 4.2 % (ref 0–6.9)
ERYTHROCYTE [DISTWIDTH] IN BLOOD BY AUTOMATED COUNT: 42.3 FL (ref 35.9–50)
HCT VFR BLD AUTO: 40.1 % (ref 37–47)
HGB BLD-MCNC: 13 G/DL (ref 12–16)
IMM GRANULOCYTES # BLD AUTO: 0.1 K/UL (ref 0–0.11)
IMM GRANULOCYTES NFR BLD AUTO: 1 % (ref 0–0.9)
LYMPHOCYTES # BLD AUTO: 2.21 K/UL (ref 1–4.8)
LYMPHOCYTES NFR BLD: 21.5 % (ref 22–41)
MCH RBC QN AUTO: 29.2 PG (ref 27–33)
MCHC RBC AUTO-ENTMCNC: 32.4 G/DL (ref 32.2–35.5)
MCV RBC AUTO: 90.1 FL (ref 81.4–97.8)
MONOCYTES # BLD AUTO: 0.62 K/UL (ref 0–0.85)
MONOCYTES NFR BLD AUTO: 6 % (ref 0–13.4)
NEUTROPHILS # BLD AUTO: 6.84 K/UL (ref 1.82–7.42)
NEUTROPHILS NFR BLD: 66.6 % (ref 44–72)
NRBC # BLD AUTO: 0 K/UL
NRBC BLD-RTO: 0 /100 WBC (ref 0–0.2)
PLATELET # BLD AUTO: 266 K/UL (ref 164–446)
PMV BLD AUTO: 10.8 FL (ref 9–12.9)
RBC # BLD AUTO: 4.45 M/UL (ref 4.2–5.4)
WBC # BLD AUTO: 10.3 K/UL (ref 4.8–10.8)

## 2023-05-22 PROCEDURE — RXMED WILLOW AMBULATORY MEDICATION CHARGE: Performed by: ADVANCED PRACTICE MIDWIFE

## 2023-05-22 PROCEDURE — 700102 HCHG RX REV CODE 250 W/ 637 OVERRIDE(OP): Performed by: ADVANCED PRACTICE MIDWIFE

## 2023-05-22 PROCEDURE — 36415 COLL VENOUS BLD VENIPUNCTURE: CPT

## 2023-05-22 PROCEDURE — 85025 COMPLETE CBC W/AUTO DIFF WBC: CPT

## 2023-05-22 PROCEDURE — 770002 HCHG ROOM/CARE - OB PRIVATE (112)

## 2023-05-22 PROCEDURE — A9270 NON-COVERED ITEM OR SERVICE: HCPCS | Performed by: ADVANCED PRACTICE MIDWIFE

## 2023-05-22 RX ORDER — PSEUDOEPHEDRINE HCL 30 MG
100 TABLET ORAL 2 TIMES DAILY PRN
Qty: 30 CAPSULE | Refills: 0 | Status: SHIPPED | OUTPATIENT
Start: 2023-05-22 | End: 2023-11-21

## 2023-05-22 RX ORDER — ACETAMINOPHEN 500 MG
1000 TABLET ORAL EVERY 6 HOURS PRN
Qty: 30 TABLET | Refills: 0 | Status: SHIPPED | OUTPATIENT
Start: 2023-05-22 | End: 2023-11-21

## 2023-05-22 RX ORDER — IBUPROFEN 800 MG/1
800 TABLET ORAL EVERY 8 HOURS PRN
Qty: 30 TABLET | Refills: 0 | Status: SHIPPED | OUTPATIENT
Start: 2023-05-22 | End: 2023-11-21

## 2023-05-22 RX ADMIN — ACETAMINOPHEN 1000 MG: 500 TABLET, FILM COATED ORAL at 14:23

## 2023-05-22 RX ADMIN — DOCUSATE SODIUM 100 MG: 100 CAPSULE, LIQUID FILLED ORAL at 20:53

## 2023-05-22 RX ADMIN — IBUPROFEN 800 MG: 800 TABLET, FILM COATED ORAL at 12:49

## 2023-05-22 RX ADMIN — IBUPROFEN 800 MG: 800 TABLET, FILM COATED ORAL at 20:53

## 2023-05-22 RX ADMIN — IBUPROFEN 800 MG: 800 TABLET, FILM COATED ORAL at 03:12

## 2023-05-22 ASSESSMENT — PAIN DESCRIPTION - PAIN TYPE
TYPE: ACUTE PAIN

## 2023-05-22 NOTE — CARE PLAN
The patient is Stable - Low risk of patient condition declining or worsening    Shift Goals  Clinical Goals: Pain management  Patient Goals: bond, breast feed  Family Goals: bond    Progress made toward(s) clinical / shift goals:  Pain is minimal. Patient requests to have cold packs continuously to help with episiotomy pain.    Patient is not progressing towards the following goals:

## 2023-05-22 NOTE — PROGRESS NOTES
1850- Bedside report received. VSS.   2000- Patient assessment completed. Reviewed POC for the night and answered all questions. Call light within reach.

## 2023-05-22 NOTE — PROGRESS NOTES
Report received from Kathryn LAMBERT @ 0700. Assumed care. Patient's awake and alert. Assessment done. She denies pain at this time. Encouraged patient to call and encouraged to ask questions. Will check patient at intervals.

## 2023-05-22 NOTE — DISCHARGE SUMMARY
Discharge Summary:      Barb Valdez    Admit Date:   2023  Discharge Date:  2023     Admitting diagnosis:  Labor and delivery indication for care or intervention [O75.9]  Discharge Diagnosis: Status post vaginal, spontaneous.  Pregnancy Complications: premature labor  Tubal Ligation:  no        History:  Past Medical History:   Diagnosis Date    Active medical problems: none     Anxiety     Depression     IUD (intrauterine device) in place      OB History    Para Term  AB Living   1 1 0 1 0 1   SAB IAB Ectopic Molar Multiple Live Births   0 0 0 0 0 1      # Outcome Date GA Lbr Yusuf/2nd Weight Sex Delivery Anes PTL Lv   1  23 36w0d 01:30 / 00:29 2.84 kg (6 lb 4.2 oz) F Vag-Spont None Y FAM        Patient has no known allergies.  There are no problems to display for this patient.       Hospital Course:   27 y.o. , now para 1, was admitted with  labor, advanced cervical dilation, and SROM. She progressed quickly and delivered vaginally. Patient postpartum course was unremarkable, with progressive advancement in diet , ambulation and toleration of oral analgesia. Patient without complaints today and desires discharge.      Vitals:    23 1000 23 1400 23 1800 23 0600   BP: 128/87 128/88 124/87 113/65   Pulse: 77 86 85 75   Resp: 18 18 18 17   Temp: 36.6 °C (97.8 °F) 37.2 °C (99 °F) 37.1 °C (98.8 °F) 36.6 °C (97.8 °F)   TempSrc: Temporal Temporal Temporal Temporal   SpO2: 95% 94% 94% 95%   Weight:       Height:           Current Facility-Administered Medications   Medication Dose    oxytocin (Pitocin) infusion (for post delivery)  125 mL/hr    lactated ringers infusion      docusate sodium (COLACE) capsule 100 mg  100 mg    ibuprofen (MOTRIN) tablet 800 mg  800 mg    acetaminophen (TYLENOL) tablet 1,000 mg  1,000 mg    oxyCODONE immediate-release (ROXICODONE) tablet 5 mg  5 mg       Exam:  Breast Exam: negative  Abdomen: Abdomen soft,  non-tender. BS normal. No masses,  No organomegaly  Fundus Non Tender: yes  Incision: none  Perineum: midline episiotomy  Extremity: extremities, peripheral pulses and reflexes normal     Labs:  Recent Labs     05/21/23  0237   WBC 9.5   RBC 4.63   HEMOGLOBIN 13.7   HEMATOCRIT 41.3   MCV 89.2   MCH 29.6   MCHC 33.2*   RDW 41.9   PLATELETCT 249   MPV 10.7        Activity:   Discharge to home  Pelvic Rest x 6 weeks    Assessment:  normal postpartum course  Discharge Assessment: No heavy bleeding or foul vaginal discharge      Follow up: [facility] in 5-6 weeks for vaginal     Discharge Meds:   Current Outpatient Medications   Medication Sig Dispense Refill    ibuprofen (MOTRIN) 800 MG Tab Take 1 Tablet by mouth every 8 hours as needed for Moderate Pain. 30 Tablet 0    docusate sodium 100 MG Cap Take 100 mg by mouth 2 times a day as needed for Constipation. 30 Capsule 0    acetaminophen (TYLENOL) 500 MG Tab Take 2 Tablets by mouth every 6 hours as needed for Mild Pain. 30 Tablet 0     I reviewed in detail with patient  all indications that would necessitate emergency care. We reviewed bleeding expectations as well as expected healing with perineal repairs. We discussed birth control options and she is aware that can order this at her 6 week PP appointment. Finally, we reviewed postpartum depression and anxiety. She verbalizes understanding.  I have encouraged pelvic rest and use of condom if she does desire to have intercourse.      ALAN Jha

## 2023-05-22 NOTE — LACTATION NOTE
Lactation follow-up visit:    S: MOB reported she is latching baby independently onto her breasts without difficulty.  She denied pain and tissue damage to her breasts with latch.  MOB also reported infant is able to stay awake a  little longer at the breast during feeds than she did yesterday.      MOB reported she is receiving approximately 1 ml total of expressed breast milk per pumping session.    O: Infant's weight loss to date is 3.7% and she continues to void and stool appropriately.    A/P: Continue to follow three step feeding plan as previously instructed.  MOB stated she will rent hospital grade breast pump for home use.  She was encouraged to take all pump parts home with her.    MOB was informed of the need to follow up with a lactation consultant post discharge who can help her determine when infant is feeding effectively from the breast so that supplementation and pumping can be discontinued.    Provided MOB with additional feeding syringes.    Opportunity provided for questions and all questions answered as verbalized by MOB.    MOB was encouraged to call for lactation support as needed throughout her hospital stay.

## 2023-05-22 NOTE — CARE PLAN
The patient is Stable - Low risk of patient condition declining or worsening    Shift Goals  Clinical Goals: Pain management; ambulate  Patient Goals: To be able to take care of the baby.  Family Goals: bond    Progress made toward(s) clinical / shift goals:      Problem: Pain - Standard  Goal: Alleviation of pain or a reduction in pain to the patient’s comfort goal  Outcome: Progressing  Note: Patient's pain is well controlled by Motrin and Tylenol.     Problem: Psychosocial - Postpartum  Goal: Patient will verbalize and demonstrate effective bonding and parenting behavior  Outcome: Progressing  Note: Patient and FOB is bonding well with infant. They are very independent in performing baby care such as feeding, changing of diaper and etc.        Patient is not progressing towards the following goals:

## 2023-05-23 ENCOUNTER — PHARMACY VISIT (OUTPATIENT)
Dept: PHARMACY | Facility: MEDICAL CENTER | Age: 28
End: 2023-05-23
Payer: COMMERCIAL

## 2023-05-23 VITALS
HEIGHT: 64 IN | TEMPERATURE: 97 F | BODY MASS INDEX: 23.39 KG/M2 | HEART RATE: 82 BPM | OXYGEN SATURATION: 95 % | SYSTOLIC BLOOD PRESSURE: 106 MMHG | WEIGHT: 137 LBS | RESPIRATION RATE: 20 BRPM | DIASTOLIC BLOOD PRESSURE: 65 MMHG

## 2023-05-23 PROCEDURE — 700102 HCHG RX REV CODE 250 W/ 637 OVERRIDE(OP): Performed by: ADVANCED PRACTICE MIDWIFE

## 2023-05-23 PROCEDURE — A9270 NON-COVERED ITEM OR SERVICE: HCPCS | Performed by: ADVANCED PRACTICE MIDWIFE

## 2023-05-23 RX ADMIN — IBUPROFEN 800 MG: 800 TABLET, FILM COATED ORAL at 05:07

## 2023-05-23 RX ADMIN — ACETAMINOPHEN 1000 MG: 500 TABLET, FILM COATED ORAL at 00:05

## 2023-05-23 RX ADMIN — ACETAMINOPHEN 1000 MG: 500 TABLET, FILM COATED ORAL at 07:30

## 2023-05-23 RX ADMIN — DOCUSATE SODIUM 100 MG: 100 CAPSULE, LIQUID FILLED ORAL at 07:31

## 2023-05-23 NOTE — DISCHARGE INSTRUCTIONS
PATIENT DISCHARGE EDUCATION INSTRUCTION SHEET    REASONS TO CALL YOUR OBSTETRICIAN  Persistent fever, shaking, chills (Temperature higher than 100.4) may indicate you have an infection  Heavy bleeding: soaking more than 1 pad per hour; Passing clots an egg-sized clot or bigger may mean you have an postpartum hemorrhage  Foul odor from vagina or bad smelling or discolored discharge or blood  Breast infection (Mastitis symptoms); breast pain, chills, fever, redness or red streaks, may feel flu like symptoms  Urinary pain, burning or frequency  Incision that is not healing, increased redness, swelling, tenderness or pain, or any pus from episiotomy or  site may mean you have an infection  Redness, swelling, warmth, or painful to touch in the calf area of your leg may mean you have a blood clot  Severe or intensified depression, thoughts or feelings of wanting to hurt yourself or someone else   Pain in chest, obstructed breathing or shortness of breath (trouble catching your breath) may mean you are having a postpartum complication. Call your provider immediately   Headache that does not get better, even after taking medicine, a bad headache with vision changes or pain in the upper right area of your belly may mean you have high blood pressure or post birth preeclampsia. Call your provider immediately    HAND WASHING  All family and friends should wash their hands:  Before and after holding the baby  Before feeding the baby  After using the restroom or changing the baby's diaper    WOUND CARE  Ask your physician for additional care instructions. In general:  Episiotomy/Laceration  May use mariam-spray bottle, witch hazel pads and dermaplast spray for comfort  Use mariam-spray bottle after urinating to cleanse perineal area  To prevent burning during urination spray mariam-water bottle on labial area   Pat perineal area dry until episiotomy/laceration is healed  Continue to use mariam-bottle until bleeding stops as  needed  If have a 2nd degree laceration or greater, a Sitz bath can offer relief from soreness, burning, and inflammation   Sitz Bath   Sit in 6 inches of warm water and soak laceration as needed until the laceration heals    VAGINAL CARE AND BLEEDING  Nothing inside vagina for 6 weeks:   No sexual intercourse, tampons or douching  Bleeding may continue for 2-4 weeks. Amount and color may vary  Soaking 1 pad or more in an hour for several hours is considered heavy bleeding  Passing large egg sized blood clots can be concerning  If you feel like you have heavy bleeding or are having increasing amount of blood clots call your Obstetrician immediately  If you begin feeling faint upon standing, feeling sick to your stomach, have clammy skin, a really fast heartbeat, have chills, start feeling confused, dizzy, sleepy or weak, or feeling like you're going to faint call your Obstetrician immediately    HYPERTENSION   Preeclampsia or gestational hypertension are types of high blood pressure that only pregnant women can get. It is important for you to be aware of symptoms to seek early intervention and treatment. If you have any of these symptoms immediately call your Obstetrician    Vision changes or blurred vision   Severe headache or pain that is unrelieved with medication and will not go away  Persistent pain in upper abdomen or shoulder   Increased swelling of face, feet, or hands  Difficulty breathing or shortness of breath at rest  Urinating less than usual    URINATION AND BOWEL MOVEMENTS  Eating more fiber (bran cereal, fruits, and vegetables) and drinking plenty of fluids will help to avoid constipation  Urinary frequency and urgency after childbirth is normal  If you experience any urinary pain, burning or frequency call your provider    BIRTH CONTROL  It is possible to become pregnant at any time after delivery and while breastfeeding  Plan to discuss a method of birth control with your physician at your post  "delivery follow up visit    POSTPARTUM BLUES  During the first few days after birth, you may experience a sense of the \"blues\" which may include impatience, irritability or even crying. These feelings come and go quickly. However, as many as 1 in 10 women experience emotional symptoms known as postpartum depression.     POSTPARTUM DEPRESSION    May start as early as the second or third day after delivery or take several weeks or months to develop. Symptoms of \"blues\" are present, but are more intense: Crying spells; loss of appetite; feelings of hopelessness or loss of control; fear of touching the baby; over concern or no concern at all about the baby; little or no concern about your own appearance/caring for yourself; and/or inability to sleep or excessive sleeping. Contact your Obstetrician if you are experiencing any of these symptoms     PREVENTING SHAKEN BABY  If you are angry or stressed, PUT THE BABY IN THE CRIB, step away, take some deep breaths, and wait until you are calm to care for the baby. DO NOT SHAKE THE BABY. You are not alone, call a supporter for help.  [facility] 24/7 crisis call line ([phone]) or (1-742.122.6554)  You can also text them, text \"ANSWER\" (738321)  "

## 2023-05-23 NOTE — PROGRESS NOTES
An update received from Anne-Marie LAMBERT. Assumed care. Patient's awake and ambulating well. Discussed plan of care. She does have a discharge order today. Medicated for pain. She's bonding well with infant. She feels comfortable with baby care. FOB is at the bedside and very supportive.     @ 1005: Discharge instruction discussed. Emphasized the importance of  screening follow-up test. Questions and concerns have been answered. Baby's ID band matches with MOB. Patient and baby are both doing well. Car seat available.

## 2023-05-23 NOTE — PROGRESS NOTES
Shift Goals  Clinical Goals: vitals stable, fundus firm, voiding without difficulty  Patient Goals: pain managment  Family Goals: support    Progress made toward(s) clinical / shift goals:  Barb is post partum with her first baby, her vital signs are stable, her fundus is firm with scant lochia, she is voiding without difficulty and ambulating ad rafael, she is managing her pain with ordered pain medications and her  is at bedside for support

## 2023-05-23 NOTE — LACTATION NOTE
This note was copied from a baby's chart.  Met with mother for a follow up lactation consultation prior to her discharge today. She has been following a three step feeding plan due to baby's gestational age. She reports that feedings went well overnight, baby was latching without difficulty, she was pumping after each feeding and supplementing with her EBM/Enfamil per the supplemental feeding guidelines handout. She reports that she feels comfortable with latch and she does not need LC assistance at this time. She reports that her pumped volumes have been steady at 1-2mL each time. She continues to use appropriate pump settings, and reports that the pump does not cause discomfort.    She plans on renting an HG pump at discharge, and she has a home pump as well. She plans to follow up with outpatient LC to assist with transitioning baby to exclusively breast feeding.     Plan: Offer the breast to the baby on cue, at least once every 3hrs. If unable to achieve optimal latch after ten minutes of attempts, pump and supplement. Supplement according to Supplemental Feeding Guidelines handout. Rent HG pump at discharge. Follow up with outpatient LC to guide feeding plan as her milk supply increases. [facility] handout provided.

## 2023-05-23 NOTE — DISCHARGE SUMMARY
POSTPARTUM    PROGRESS  NOTE;    PATIENT ID:  NAME:  Barb Valdez  MRN:               [id number]  YOB: 1995    Admission date; 2023  Discharge date; 2023     27 y.o. female  at 36w0d PPD#2 s/p     Subjective: No complaints    Objective:    Vitals:    23 1400 23 1800 23 0600 23 1800   BP: 128/88 124/87 113/65 136/81   Pulse: 86 85 75 79   Resp: 18 18 17 18   Temp: 37.2 °C (99 °F) 37.1 °C (98.8 °F) 36.6 °C (97.8 °F) 36.6 °C (97.9 °F)   TempSrc: Temporal Temporal Temporal Temporal   SpO2: 94% 94% 95% 98%   Weight:       Height:         General: No acute distress, resting comfortably in bed.  HEENT: normocephalic, nontraumatic, PERRLA, EOMI  Cardiovascular: Heart RRR with no murmurs, rubs or gallops. Distal Pulses 2+  Respiratory: symmetric chest expansion, lungs CTA bilaterally with no wheezes rales or rhonci  Abdomen: soft, mildly tender, fundus firm, +BS  Genitourinary: lochia light, denies excessive vaginal bleeding  Musculoskeletal: strength 5/5 in four extremities  Neuro: non focal with no numbness, tingling or changes in sensation    Recent Labs     23  0237 23  1051   WBC 9.5 10.3   RBC 4.63 4.45   HEMOGLOBIN 13.7 13.0   HEMATOCRIT 41.3 40.1   MCV 89.2 90.1   MCH 29.6 29.2   RDW 41.9 42.3   PLATELETCT 249 266   MPV 10.7 10.8   NEUTSPOLYS 58.00 66.60   LYMPHOCYTES 29.20 21.50*   MONOCYTES 8.90 6.00   EOSINOPHILS 2.40 4.20   BASOPHILS 0.40 0.70     No results for input(s): SODIUM, POTASSIUM, CHLORIDE, CO2, GLUCOSE, BUN, CPKTOTAL in the last 72 hours.    Current Meds:   Current Facility-Administered Medications   Medication Dose Frequency Provider Last Rate Last Admin    oxytocin (Pitocin) infusion (for post delivery)  125 mL/hr Continuous ALAN Jha 125 mL/hr at 05/21/23 0700 125 mL/hr at 23 0700    lactated ringers infusion   PRN ALAN Jha        docusate sodium (COLACE) capsule 100 mg  100 mg BID PRN Jayjay  Saqib, OGNP   100 mg at 23    ibuprofen (MOTRIN) tablet 800 mg  800 mg Q8HRS PRN Romeliassia Saqib, OGNP   800 mg at 23 0507    acetaminophen (TYLENOL) tablet 1,000 mg  1,000 mg Q6HRS PRN Carrissia Saqib, OGNP   1,000 mg at 23 0005    oxyCODONE immediate-release (ROXICODONE) tablet 5 mg  5 mg BID PRN Carrissia Saqib, OGNP   5 mg at 23 0444   Last reviewed on 2023  4:03 AM by Lenane Pa R.N.      Assessment:  27 y.o. female  at 36w0d PPD#2 s/p -patient was admitted in active labor on 2023 she went on to deliver via normal spontaneous vaginal delivery with a small second-degree laceration repaired in usual fashion she had a normal postpartum course and was discharged on second day with normal labs and vital signs she will follow-up in 6 weeks with Dr. Hughes.  Patient has been instructed to use Motrin over-the-counter for pain control      Plan:   Routine care  Discharge today      Sebastián Hughes MD

## 2023-05-23 NOTE — CARE PLAN
Problem: Knowledge Deficit - Postpartum  Goal: Patient will verbalize and demonstrate understanding of self and infant care  Outcome: Progressing     Problem: Altered Physiologic Condition  Goal: Patient physiologically stable as evidenced by normal lochia, palpable uterine involution and vitals within normal limits  Outcome: Progressing     Problem: Infection - Postpartum  Goal: Postpartum patient will be free of signs and symptoms of infection  Outcome: Progressing   The patient is Stable - Low risk of patient condition declining or worsening    Shift Goals  Clinical Goals: vitals stable, fundus firm, voiding without difficulty  Patient Goals: pain managment  Family Goals: support    Progress made toward(s) clinical / shift goals:  Barb is post partum with her first baby, her vital signs are stable, her fundus is firm with scant lochia, she is voiding without difficulty and ambulating ad rafael, she is managing her pain with ordered pain medications and her  is at bedside for support    Patient is not progressing towards the following goals:

## 2023-05-26 ENCOUNTER — NON-PROVIDER VISIT (OUTPATIENT)
Dept: OBGYN | Facility: CLINIC | Age: 28
End: 2023-05-26
Payer: COMMERCIAL

## 2023-05-26 NOTE — PROGRESS NOTES
Summary: Barb delivered baby girl at 36.5 weeks, reports a very quick labor and delivery, approximately 2 hours total. Triple feeding initiated due to gestation age of the baby and continued at discharge. Now, feeding every 2-3 hours throughout the day, usually closer to 3 hours. Offering the left breast, baby's preference. Topping off with expressed breastmilk, following guidelines provided by the hospital. Offering same volume when not latching for nighttime feedings. Pumping after or in place of brestfeeding, yielding up to 180mls between both breasts.    Today: Latched baby to the left breast with out assistance, cross cradle. Baby transferred 20mls in 9 minutes. Pumped both breasts with HGP, yielded 50mls on the right and 25mls on the left. Baby content to be dressed and held. Parents will offer bottle after appointment with pediatrician directly after our appointment.   Plan: Feed baby frequently throughout the day, every 1.5-3 hours, up to 4 hours one time at night. When latching, only practice on the left for now. Up to 10 minutes on the breast. Follow with bottle of 50-60mls. Can pump and bottle feed in place of latching as desired. Strongly encouraged to take shifts at night to allow each parent one longer stretch of sleep.   Follow up:   Lactation appointment:  2023  Baby 's Provider appointment: 14 Day Well Child Check   Referrals: Maternal Mental Health Mary Bazzi     Maternal Diagnosis/Problem:  Lactation Problem: Baby difficulty latching   Infant Diagnosis/Problem:   difficulties feeding at the breast     Subjective:     Barb Valdez is a 27 y.o. female here for lactation care. She is here today with  (Fam) and baby, Ramonita .    Concerns:   Maternal: Latch on difficulties , Engorgement, Weight check, Infant feeding evaluation, and Breastfeeding questions   Infant: Sleepy baby    HPI:   Pertinent  history: , 36.5 weeks    Mother does not have a history  of advanced maternal age, GDM, hypertension prior to pregnancy, GHTN, insulin resistance, multiple gestation, PCOS, and thyroid disease      Breast changes in pregnancy: Yes  History of breast surgeries: No    FEEDING HISTORY:    Previous Breastfeeding History: First baby.   Hospital Course: Triple feeding initiated due to gestation age of the baby and continued at discharge.   Currently 5/26/2023: Feeding every 2-3 hours throughout the day, usually closer to 3 hours. Offering the left breast, baby's preference. Topping off with expressed breastmilk, following guidelines provided by the hospital. Offering same volume when not latching for nighttime feedings. Pumping after or in place of brestfeeding, yielding up to 180mls between both breasts.      Both breasts: No / Left side only     Supplement: Supplementation initiated inpatient and Expressed breast milk  Quantity: 40mls  How given/devices: Bottle  Bottle/nipple type: Level 1    Nipple Shield Use: None    Breast Pumping:  Frequency: Every 2-3 hours  Quantity Obtained: 100-180mls   Type of Pump: Platinum and Spectra available   Flange size/type: 25mm  Wearable: No  NO pain with pumping    Maternal ROS:  Constitutional: No fever, chills. Feeling well  Breasts: No soreness of breasts and No soreness of nipples  Psychiatric: Feels exhausted and Feels overwhelmed  Mental Health: No mention of feeling irritable, agitated, angry, overwhelmed, apathetic, appetite changes, exhausted nor having sleep changes outside infant feeds/demands.  Current Outpatient Medications on File Prior to Visit   Medication Sig Dispense Refill    ibuprofen (MOTRIN) 800 MG Tab Take 1 Tablet by mouth every 8 hours as needed for Moderate Pain. 30 Tablet 0    docusate sodium 100 MG Cap Take 1 capsule by mouth 2 times a day as needed for Constipation. 30 Capsule 0    acetaminophen (TYLENOL) 500 MG Tab Take 2 Tablets by mouth every 6 hours as needed for Mild Pain. 30 Tablet 0    Misc. Devices  (BREAST PUMP) [facility] For breastfeeding difficulty or milk storage 1 Each 0    Prenatal MV-Min-Fe Fum-FA-DHA (PRENATAL 1 PO) Take  by mouth.       No current facility-administered medications on file prior to visit.      Past Medical History:   Diagnosis Date    Active medical problems: none     Anxiety     Depression     IUD (intrauterine device) in place        Objective:     Maternal Physical Lactation Exam  General: No acute distress  Breasts: Symmetrical  and Full  Nipples: intact  Psychiatric: Normal mood and affect. Her behavior is normal. Judgment and thought content normal.  Mental Health: Did NOT exhibit sadness, crying, feeling overwhelmed, agitation or hypervigilance.    Assessment/Plan & Lactation Counseling:     Infant Exam on Infant Chart    Infant Weight History:   05/21/2023: 6# 4.2oz  05/26/2023: 5# 10.8oz    Infant Intake at Breast: L  20mls     R  Not Offered     Total: 20mls  Milk Transfer at this feeding:   Ineffective breastfeeding; not able to transfer a full feed from breast r/t     Pumped: Type of Pump: Platinum   Quantity Pumped: L 25mls    R 50mls    Total: 75mls  Initiation of Feeding: Infant initiates  Position of Feeding:    Right: Not Offered  Left: cross cradle  Attachment Achieved: rapidly  Nipple shield: N/A      Suck Pattern at the Breast: Suck burst and normal rest  Suck Pattern on the Bottle: Not Indicated   Behavior Following Observed Feeding: sleeping  Nipple Pain: None     Latch: Mom latches independently  Suckling/Feeding: attaches, baby falls asleep, baby roots, elicits EUFEMIA, and frequent pauses  Sucking strength: Moderate   Sucking Rhythm Coordinated   Compression: WNL    Once latched, baby fell into a mature and fully integrated SSB pattern.    Swallowing No difficulty noted  If functional feeding, it is quiet, rhythmic, coordinated, organized, effeicent safe, satisfying and pleasurable for both parent and baby? No  Milk Supply Available: normal+        MATERNAL PERSONALIZED  BREASTFEEDING PLAN  Discussed concerns and symptoms as listed above in assessment and guidance summarized below. Shared decision making was used between myself and the family for this encounter, home care, and follow up. Topics reviewed included:   4th Trimester: The 12-week period immediately after mom has had the baby. Not everyone has heard of it, but every mother and their  baby will go through it. It is a time of great physical and emotional change as the baby adjusts to being outside the womb, and the family adjusts to new life as parents  During the fourth trimester, one can expect fussiness and crying from the baby and very likely exhaustion for the family. [place] babies are learning to adjust to life outside the womb where it was warm and squishy!  There is a lot of misinformation about babies and their needs, and parents are often encouraged to ignore baby's signals. Bad idea. Babies are “half-baked” at birth and have much to learn with the help of physical and emotional support from caregivers. Taking care of a baby's needs is an investment that pays off with a happier, healthier child and adult.  It can take weeks or even months for your body to feel totally normal again. There is a major hormonal upheaval experienced by moms in the first few weeks after birth, because their bodies are shifting from many pregnancy hormones to a more normal hormonal make-up.  These first three months with baby earth side is a delicate time. Honor it with a mindful dose of support. Mindful Mamma's is an maricel that may help.   Self-Compassion through Relational Support and Interaction.   Be kind to ourself. This is the first step in reducing anxiety and depression. Pay attention to how you are doing.   What do you need? Food, Rest, Sleep, Support, to Laugh/giggle: who will you ask today? They want to help you. We want to help you.   How do you stop your self-blame, or your self-criticism?   Get out of the house each  "day, walk or drive somewhere or ask a friend to drive you,  a \"treat\" at a drive through.   Mood and Anxiety Disorders: Having a new baby can be joyful time for some new moms, but a difficult time for others. For all, it is a time of profound physical and emotional change. Balancing baby, family, partners and friends, work, pets, and preexisting or new life stressors as well as sleep deprivation can be extremely challenging. Untreated depression, sleep exhaustion, and anxiety are each a challenge that must be addressed and in addition can contribute to early cessation of breastfeeding. It is important both for the mental health and physical health of new moms and babies to get on the path to feeling better and if therapeutic support is needed, specific resources are available.   Sleep or Lack of: Human Giver Syndrome (moms) tells us it’s “self-indulgent” to rest and sleep, which makes as much sense as believing it’s weak or self indulgent to breathe. We discussed strategies to manage restorative sleep, although short amounts, significant to the mental health of the mother. The principle follows:  Mom goes to sleep right after 8pm +/- feeding  Partner covers first late evening feeding (10pm/11pm), settles baby,  then goes to bed  Mom covers next feeding 1am /2am, partner sleeps  Next feeding share  Prematurity and LPIs (Late  Infants 36-37 Weeks)The premature or early baby born before 37 weeks of gestation is very prone to be sleepy at the breast, especially in the first several weeks. The most common reason for lack of milk transfer in newborns is simply falling asleep at the breast. It can sometimes be very difficult to determine whether sleepiness is due to insufficient transfer from a tongue tie/oral motor issues, or whether they are really primarily sleepy. If a baby needs to be woken frequently to feed during the day and/or falls asleep bottle feeding, they are NOT ready for prime time at " the breast, so please don't expect them to transfer sufficiently at the breast.  Infants who are sleepy can take 3 and, very occasionally, 4-6 weeks after their due date to be strong enough to transfer at every feeding. One way to know if this is the issue for this baby is if he has 1-2 feeds during the day when he is great at the breast, transfers sufficient volume, and does not need a bottle. This would typically occur after a good nap, or first feed in the am. I would encourage you to feed the baby less often at the breast, so mom can decrease the frequency of triple feeds. Pump and bottle feed several of the feeds. Once the baby demonstrates success with a few feeds at the breast, then gradually increase the # of feeds at the breast. Triple feeding should never last longer than a week, double feeding yes, not triple 24/7. Sustainability is critical.  Hydration: Staying hydrated is important however lack of hydration is usually not a cause of significantly low milk production.  Everyone needs a different amount of water, depending on their activity and diet. A high salt and/or high-protein diet, high physical activity, or very warm weather/sweating will require more fluids. A person eating a diet high in veggies and fruit, with a lack of physical activity will require fewer fluids. There is no magic number for the # of ounces of water each day.The best way to know that you are well hydrated is by looking at your urine.  Urine should look clear to light pale yellow, and you should need to pee at least every 3 to 4 hours unless you have a large bladder capacity.  Herbs and Medications: A galactagogue is an herb or medication taken by a breastfeeding mother to increase her milk supply. We know that for centuries mothers around the world have sought out remedies to increase their milk supply. However, there is limited research on the safety and effectiveness of herbal galactagogues, which makes it hard for us to  endorse them. It is not known if any of these herbs are truly effective, and it is difficult to predict how a specific herbal galactagogue will affect an individual, requiring “trial and error” in many situations. When effective, results are generally seen within 24-72 hours of starting an herbal galactagogue. Many of these herbs are used to decrease high blood sugar. If you are diabetic or have problems with low blood sugar, or thyroid disease you may not be a candidate for herbs. Not all women can increase their low milk supply with a galactagogue due to the many underlying causes of low milk production.  When taking a galactagogue, remember that frequent milk removal is still the most effective way to increase supply.   Feeding:   Infant feeding well given current interval growth, guidelines to follow:  Feed your baby every 1.5-3 hours, more often if baby acts hungry.   Awaken baby for feeding if going over 3 hours in the day.   Until back to birth weight, ONE four hour at night is acceptable if has had 8 prior feedings in 24 hours.    Daily goal: 8-10 feedings per 24 hours.   Supplement:   Supplement with expressed milk  Friday: 50mls  Saturday: 60mls  Sunday: 60-75mls  Pacing the feeding:  A slow flow nipple helps, but how you feed the baby is more important.  How you are  positioning the bottle can compensate for a faster flow nipple.  When bottle-feeding, the baby should control how much is consumed at a feeding.  Holding the baby in an upright position with the bottle horizontal ensures that the baby gets milk only when sucking.  Here is a nice video demonstrating this concept of paced bottle feeding,  https://www.yourestOpolisube.com/watch?v=PaWZN1lJG7X Think baby led, not parent led.  Pacifier Use:  The American Academy of Pediatrics' Position Paper reports: Although we recommend a conservative approach regarding pacifier use, we do not endorse a complete ban on the use of pacifiers, nor do we support an approach  that induces parental guilt concerning their choices about the use of pacifiers. Pacifier use and breastfeeding in term and  newborns- a systematic review and meta-analysis from the  J of Pediatrics Published online 2022. Has found that when pacifiers are used among individuals who have been counseled on the risks, do not interfere with breastfeeding exclusivity or duration.   Positioning Techniques   Pillow used: Lauren Valente  Suggested positions Cross cradle  Fine tune position by making sure your fingers beneath the breast as well as your bra, are out of the way of your baby's chin.  Positioning: See http://globalhealthmedia.org/portfolio-items/positions-for-breastfeeding/?enkulcnbrAD=14154  Latch on Techniques   Aim is an asymmetric deep latch.  First make yourself comfortable. Sit with knees bent (unless lying down), feet on a stool if needed. You will support your baby, and pillows will be used to support YOU. You want your baby's belly facing your breast/body (belly to belly). If your baby is extremely fussy and crying, do skin-to-skin for a while until he or she calms down, then try (or try again).   Fine tune latch:  By holding your baby more securely at the breast, assisting your baby to stay attached by:  Support your baby with your hand behind the shoulder blades (NOT THE HEAD).  1. Align your baby's NOSE with your nipple  2. Your baby's chin should be the first part of his or her body to touch your breast  3. Tickle the baby's upper lip or nose with your nipple  4. When your baby's mouth is WIDE OPEN, swiftly bring your baby's mouth over your nipple/areola.  Steps 2 and 3 could be slightly reversed order or essentially happening at the same time.  Bringing your baby to your breast, not breast to the baby  Your baby's cheek to touch breast securely, nose tipped back  Hold your baby firmly in place so when your baby forgets to suck and picks it back up again your baby is in  "the correct spot. You will be extinguishing behavior and replacing it with a deeper latch to stimulate suck and provide satisfaction at the breast.  Your baby needs as much breast as deep in the mouth as possible to allow your nipples to heal and for you more importantly to maximize efficiency at the breast  If that doesn’t help, you should make an appointment with me to re-evaluate.   Latch is asymmetrical, leading with the chin, getting the baby below the breast, as if offering a large \"deli courtney sandwich\".  Here is a video from PATRICIO on the asymmetric latch       https://www.[employer].com/watch?v=0I-BFl5Ed34  Pumping Guidelines:  Both breasts 8x every 24 hours  Every 2-3 hours during the day  One 5-6 hour stretch at night.   Bra    Consider a hands free bra - [employer] Wishes is recommended.  Type of Pump:  Platinum and Spectra  [employer] [place] Settings http://www.Secure-NOK.[employer]/healthcare-professionals/videos/ameda-platinum-with-hygienikit-video   Speed: Start at 80 then turn down to 60 after 1.5-2 minutes when you see milk in the flange channel  Suction: To comfort, goal of  30-50%. NEVER to discomfort.   [employer] Settings  Press letdown button when first starting         Cycle: 70 / Vacuum: L1 - L 5 (To comfort)  Once milk lets down, press letdown button again  Cycle: 54 / Vacuum: L1 - L12 (To comfort)  To check the hours on the Spectra  https://P[employer] Call.[employer]/how-to-check-the-hours-on-your-spectra/  How long will vary woman to woman, typically 8-15 minutes, or 1-2 minutes after flow slows  Flange Fit: Freely moving nipple in the tunnel with some movement of the areola.  Today's evaluation indicates appropriate flange  Caution with Breast Massage: The word “Massage” means different things in the breastfeeding world. It is described and interpreted in so many different ways and unfortunately potentially harmful. The hands can be safe on a breast and  gentle to help in many ways but they also can be damaging when " used in the wrong way.  Lymphatic drainage massage is appropriate,  open hand , lightly stroking only, and can be highly effective. The massage advice to knead , massage deeply , vibrate with marketed tools is  most concerning. Be gentle with this gland to not increase inflammation.   Storage (Acceptable guidelines for healthy term babies)  10 hours at room temp including your pieces, may rinse but not mandatory  8 days refrigerator, don't need  to refrigerate right away if using fresh pumped milk at the next feeding  Freezer 1 year  Deep freezer 2 years  American Academy or Pediatrics has said you may mix different temperature milks.   If baby drinks breast milk from a fresh or refrigerated bottle of breast milk,  you may return the unused portion to the refrigerator and use once at next feeding.   Breast Care: Engorgement    Breast rest - No Massage, don't overfeed of over pump, down regulate production if needed  Advil 800mg every 8 hours for 48 hours with food  Ice - 10 minutes  after feeding then every 30 minutes or as desired for repeating the ice. Don't put the ice directly on the skin.  May add Tylenol 1000mg every 8 hours for 48 hours in between the Advil dosing if needed for pain.  Answers to FAQs: https://www.infantrisk.com/category/breastfeeding  Alcohol & Breastfeeding: What's your time-to-zero?  Cough & Cold Medications while Breastfeeding  Vitamin D Supplementation and Breastfeeding  Antidepressant Use While Breastfeeding: What should I know?  Breastfeeding, Caffeine, and Energy Drinks  Connect with other mothers:  Facebook:   [place] Breastfeeds: https://www.Chatous.com/[place].breastfeeds/  Well-Nourished Babies (Private group for questions and support): https://www.facebook.com/groups/045408724578079/  Breastfeeding [place] including opportunity to weight your baby and do before and after weights WEIGHT CHECKS  Tuesdays 10am - 11am. [facility], [address], 3rd floor conference  room  Check your baby's weight, do a feeding and see how your baby is growing, visit with other mothers, plan on a walk or coffee date after group.  Please download Growth: Baby and Child for Apple or Child Growth Tracker for [employer] if you would like to  document and follow your baby's growth curve.  Due to space limitations - limit strollers please (New c/section moms please use your stroller).  We would love to have dads stay, but moms won't breastfeed if there are men in the room, sorry.  The room is generally scheduled for another event following group.  Please take all diapers with you   ONLINE SUPPORT GROUPS  Postpartum Support International (PSI) support groups are conducted using a naon-ve-ufcd support model and are not intended for those experiencing a mental health crisis. Groups are 90 minutes (1.5 hours) in length. The first ~30 minutes is providing information, education, and establishing group guidelines. The next ~60 minutes is “talk time,” in which group members share and talk with each other. Group members must be present for the group guidelines before joining in the discussion or “talk time.”       In Conclusion:   Managing breastfeeding and milk supply is very dynamic,can be a complex and intimate journey, and is not one size fits all. When obstacles present themselves, it takes confidence, persistence and support. The rights of the child include optimal nutrition and mothers need help to make informed decisions. You  and your baby have been screened for biological, psychological, and social risk factors that might interfere with achieving your goals.  Support is critical. We want the family thriving not just tolerating life. You are now the focus of our Breastfeeding Medicine team; we are here to support your decisions and vision.     Follow up requires close monitoring in this time sensitive window of opportunity to establish milk supply and facilitate the learning of  breastfeeding.     Please call [phone] our voicemail line or the front office at [phone] to scheduled your next appointment.  Family is encouraged to e-mail or mychart us to update how the plan is working.    A total of 60 minutes, including infant assessment time,  was spent preparing to see the patient, obtaining and reviewing separately obtained history, performing a medically appropriate examination and evaluation, counseling and educating the family, referring and communicating with other health care professionals, documenting clinical information in the electronic health record, independently interpreting weighted feeds and infant growth results, communicating these results to the family and care coordination as detailed in the above note.       Michelle Anthony

## 2023-05-31 ENCOUNTER — TELEPHONE (OUTPATIENT)
Dept: OBGYN | Facility: CLINIC | Age: 28
End: 2023-05-31
Payer: COMMERCIAL

## 2023-05-31 NOTE — TELEPHONE ENCOUNTER
Contacted patient to inform her of the completed FMLA. A finished copy was scanned into patients chart. Patient will be picking up originals at the .

## 2023-06-02 ENCOUNTER — NON-PROVIDER VISIT (OUTPATIENT)
Dept: OBGYN | Facility: CLINIC | Age: 28
End: 2023-06-02
Payer: COMMERCIAL

## 2023-06-02 NOTE — PROGRESS NOTES
Summary: Barb has been feeding baby every 2-3 hours throughout the day and night. Latching to the left breast 3-4x daily. Offering 10-30mls after, reports baby is content and sleepy. If not breastfeeding, offering 60-70mls. Pumping 8x daily, yielding up to 50oz every 24 hours.     Today: Latched baby to the left breast with out assistance, cross cradle, difficulty sustaining. Implemented Ameda 24mm nipple shield. Transferred 28mls in 11 minutes. Latched to the right breast, football hold with the nipple shield, transferred 10mls in 8 minutes. Baby content to be dressed and placed in car seat.   Plan: Feed baby frequently throughout the day, every 1.5-3 hours, no need to wake baby for  nighttime feedings. When latching, can offer one or both breasts, up to 10 minutes on each side. Offer 20-30mls after the breast, following baby's cues. If not breastfeeding, offer 60-75mls. Will decrease pumping to 7x every 24 hours to decrease supply.   Follow up:   Lactation appointment:  2023  Baby 's Provider appointment: 14 Day Well Child Check   Referrals: None Today     Maternal Diagnosis/Problem:  Lactation Problem: Baby difficulty latching   Oversupply   Infant Diagnosis/Problem:   difficulties feeding at the breast     Subjective:     Barb Valdez is a 27 y.o. female here for lactation care. She is here today with  (Fam) and baby, Ramonita .    Concerns: Weight check and feeding evaluation     HPI:   Pertinent  history: , 36.5 weeks    Mother does not have a history of advanced maternal age, GDM, hypertension prior to pregnancy, GHTN, insulin resistance, multiple gestation, PCOS, and thyroid disease      Breast changes in pregnancy: Yes  History of breast surgeries: No    FEEDING HISTORY:    Previous Breastfeeding History: First baby.   Hospital Course: Triple feeding initiated due to gestation age of the baby and continued at discharge.   Prior to consultation on 2023: Feeding  every 2-3 hours throughout the day, usually closer to 3 hours. Offering the left breast, baby's preference. Topping off with expressed breastmilk, following guidelines provided by the hospital. Offering same volume when not latching for nighttime feedings. Pumping after or in place of brestfeeding, yielding up to 180mls between both breasts.    Currently 6/2/2023: Feeding baby every 2-3 hours throughout the day and night. Latching to the left breast 3-4x daily. Offering 10-30mls after, reports baby is content and sleepy. If not breastfeeding, offering 60-70mls. Pumping 8x daily, yielding up to 50oz every 24 hours.     Both breasts: No / Left side only     Supplement: Supplementation initiated inpatient and Expressed breast milk  Quantity: 10-60mls  How given/devices: Bottle  Bottle/nipple type: Level 1    Nipple Shield Use: None    Breast Pumping:  Frequency: 8x  Quantity Obtained: 150-250mls  Type of Pump: Platinum and Spectra available   Flange size/type: 25mm  Wearable: No  NO pain with pumping    Maternal ROS:  Constitutional: No fever, chills. Feeling well  Breasts: No soreness of breasts and No soreness of nipples  Psychiatric: Feels exhausted and Feels overwhelmed  Mental Health: No mention of feeling irritable, agitated, angry, overwhelmed, apathetic, appetite changes, exhausted nor having sleep changes outside infant feeds/demands.  Current Outpatient Medications on File Prior to Visit   Medication Sig Dispense Refill    ibuprofen (MOTRIN) 800 MG Tab Take 1 Tablet by mouth every 8 hours as needed for Moderate Pain. 30 Tablet 0    docusate sodium 100 MG Cap Take 1 capsule by mouth 2 times a day as needed for Constipation. 30 Capsule 0    acetaminophen (TYLENOL) 500 MG Tab Take 2 Tablets by mouth every 6 hours as needed for Mild Pain. 30 Tablet 0    Misc. Devices (BREAST PUMP) Misc For breastfeeding difficulty or milk storage 1 Each 0    Prenatal MV-Min-Fe Fum-FA-DHA (PRENATAL 1 PO) Take  by mouth.       No  current facility-administered medications on file prior to visit.      Past Medical History:   Diagnosis Date    Active medical problems: none     Anxiety     Depression     IUD (intrauterine device) in place        Objective:     Maternal Physical Lactation Exam  General: No acute distress  Breasts: Symmetrical  and Full  Nipples: intact  Psychiatric: Normal mood and affect. Her behavior is normal. Judgment and thought content normal.  Mental Health: Did NOT exhibit sadness, crying, feeling overwhelmed, agitation or hypervigilance.    Assessment/Plan & Lactation Counseling:     Infant Exam on Infant Chart    Infant Weight History:   05/21/2023: 6# 4.2oz  05/26/2023: 5# 10.8oz  06/02/2023: 6# 5.2oz    Infant Intake at Breast: L  28mls     R  10mls     Total: 38mls  Milk Transfer at this feeding:   Ineffective breastfeeding; not able to transfer a full feed from breast r/t, significant improvement     Pumped: Type of Pump: Platinum   Quantity Pumped: L 60mls    R 45mls    Total: 105mls  Initiation of Feeding: Infant initiates  Position of Feeding:    Right: Football   Left: cross cradle  Attachment Achieved: rapidly  Nipple shield: 24mm Ameda   Introduced today, 6/2/2023  Latch Achieved? Yes    Suck Pattern at the Breast: Suck burst and normal rest  Suck Pattern on the Bottle: Not Indicated   Behavior Following Observed Feeding: sleeping  Nipple Pain: None     Latch: Mom latches independently  Suckling/Feeding: attaches, baby falls asleep, baby roots, elicits EUFEMIA, and frequent pauses  Sucking strength: Moderate   Sucking Rhythm Coordinated   Compression: WNL    Once latched, baby fell into a mature and fully integrated SSB pattern.    Swallowing No difficulty noted  If functional feeding, it is quiet, rhythmic, coordinated, organized, effeicent safe, satisfying and pleasurable for both parent and baby? No  Milk Supply Available: Oversupply         MATERNAL PERSONALIZED BREASTFEEDING PLAN  Discussed concerns and  symptoms as listed above in assessment and guidance summarized below. Shared decision making was used between myself and the family for this encounter, home care, and follow up. Topics reviewed included:   Feeding:   Infant feeding well given current interval growth, guidelines to follow:  Feed your baby every 1.5-3 hours, more often if baby acts hungry.   Awaken baby for feeding if going over 3 hours in the day.   No need to wake baby for nighttime feedings.   Daily goal: 8-10 feedings per 24 hours.   Supplement:   Supplement with expressed milk  Offer 60-75mls when not latching  Offer 20-30mls when latching   Pumping Guidelines:  Both breasts 7x every 24 hours  Every 3 hours during the day  Up to 6 hours at night  Bra    Consider a hands free bra - Simple Wishes is recommended.  Type of Pump:  Platinum and Spectra  Ameda [place] Settings [email or web address].Tagent/healthcare-professionals/videos/ameda-platinum-[email or web address]-hygienikit-video   Speed: Start at 80 then turn down to 60 after 1.5-2 minutes when you see milk in the flange channel  Suction: To comfort, goal of  30-50%. NEVER to discomfort.   Spectra Settings  Press letdown button when first starting         Cycle: 70 / Vacuum: L1 - L 5 (To comfort)  Once milk lets down, press letdown button again  Cycle: 54 / Vacuum: L1 - L12 (To comfort)  To check the hours on the Spectra  [email or web address]/how-to-check-the-hours-[email or web address]-your-[email or web address]/  How long will vary woman to woman, typically 8-15 minutes, or 1-2 minutes after flow slows  Flange Fit: Freely moving nipple in the tunnel with some movement of the areola.  Today's evaluation indicates appropriate flange  Caution with Breast Massage: The word “Massage” means different things in the breastfeeding world. It is described and interpreted in so many different ways and unfortunately potentially harmful. The hands can be safe on a breast and  gentle to help in many ways but they also can be damaging when used in the  wrong way.  Lymphatic drainage massage is appropriate,  open hand , lightly stroking only, and can be highly effective. The massage advice to knead , massage deeply , vibrate with marketed tools is  most concerning. Be gentle with this gland to not increase inflammation.   Storage (Acceptable guidelines for healthy term babies)  10 hours at room temp including your pieces, may rinse but not mandatory  8 days refrigerator, don't need  to refrigerate right away if using fresh pumped milk at the next feeding  Freezer 1 year  Deep freezer 2 years  American Academy or Pediatrics has said you may mix different temperature milks.   If baby drinks breast milk from a fresh or refrigerated bottle of breast milk,  you may return the unused portion to the refrigerator and use once at next feeding.   Answers to FAQs: [email or web address].BuildOut/category/breastfeeding  Alcohol & Breastfeeding: What's your time-to-zero?  Cough & Cold Medications while Breastfeeding  Vitamin D Supplementation and Breastfeeding  Antidepressant Use While Breastfeeding: What should I know?  Breastfeeding, Caffeine, and Energy Drinks  Connect with other mothers:  Facebook:   [place] [facility]: [email or web address]  Well-Nourished Babies (Private group for questions and support): [email or web address].[email or web address]/[email or web address]  Breastfeeding [place] including opportunity to weight your baby and do before and after weights WEIGHT CHECKS  Tuesdays 10am - 11am. Women's Health at [address] [place] [address], [facility] [address] [place] [address], 3rd floor conference room  Check your baby's weight, do a feeding and see how your baby is growing, visit with other mothers, plan on a walk or coffee date after group.  Please download Growth: Baby and Child for Apple or Child Growth Tracker for [place] if you would like to  document and follow your baby's growth curve.  Due to space limitations - limit strollers please (New c/section moms please use your stroller).  We would  love to have dads stay, but moms won't breastfeed if there are men in the room, sorry.  The room is generally scheduled for another event following group.  Please take all diapers with you   ONLINE SUPPORT GROUPS  [employer] (PSI) support groups are conducted using a [id number]-mv-szyy support model and are not intended for those experiencing a mental health crisis. Groups are 90 minutes (1.5 hours) in length. The first ~30 minutes is providing information, education, and establishing group guidelines. The next ~60 minutes is “talk time,” in which group members share and talk with each other. Group members must be present for the group guidelines before joining in the discussion or “talk time.”       In Conclusion:   Managing breastfeeding and milk supply is very dynamic,can be a complex and intimate journey, and is not one size fits all. When obstacles present themselves, it takes confidence, persistence and support. The rights of the child include optimal nutrition and mothers need help to make informed decisions. You  and your baby have been screened for biological, psychological, and social risk factors that might interfere with achieving your goals.  Support is critical. We want the family thriving not just tolerating life. You are now the focus of our Breastfeeding Medicine team; we are here to support your decisions and vision.     Follow up requires close monitoring in this time sensitive window of opportunity to establish milk supply and facilitate the learning of  breastfeeding.    Please call [phone] our voicemail line or the front office at [phone] to scheduled your next appointment.  Family is encouraged to e-mail or mychart us to update how the plan is working.    A total of 75 minutes, including infant assessment time,  was spent preparing to see the patient, obtaining and reviewing separately obtained history, performing a medically appropriate examination and evaluation, counseling  and educating the family, referring and communicating with other health care professionals, documenting clinical information in the electronic health record, independently interpreting weighted feeds and infant growth results, communicating these results to the family and care coordination as detailed in the above note.       Michelle Anthony

## 2023-06-06 ENCOUNTER — TELEMEDICINE (OUTPATIENT)
Dept: OBGYN | Facility: CLINIC | Age: 28
End: 2023-06-06
Payer: COMMERCIAL

## 2023-06-06 DIAGNOSIS — N64.3 HYPERLACTATION: ICD-10-CM

## 2023-06-06 PROCEDURE — 99214 OFFICE O/P EST MOD 30 MIN: CPT | Mod: 95 | Performed by: NURSE PRACTITIONER

## 2023-06-06 NOTE — PROGRESS NOTES
Telemedicine: New Patient   This evaluation was conducted via Zoom using secure and encrypted videoconferencing technology. The patient was in their home in the [facility].    The patient's identity was confirmed and verbal consent was obtained for this virtual visit.          Summary: Initial difficulty with breastfeeding her 36.0 week old daughter, home triple feeding, then started a NS day 12 and found a much easier latch. Feeding every 2 hours in the day, every 3 hours at night. Mom has more milk than baby needs, by about 48oz now, pumping 200ml, 7x/day and storing it.   Plan: Will continue on the nipple shield introducing bare breast on occasion to evaluate, but not rushing the transition off the nipple shield. Will actively work on decreasing supply with decreasing duration of pumping from 10-15 minutes to 8-12 minutes and decrease as she can, balancing mom's comfort. Focusing on dropping another pump and decreasing that time duration by an additional 1-2 minutes. Discussed the use of sudafed 30mg, repeating once to facilitate decreasing supply if interested.  Follow up:   Lactation appointment:  23 noon  Baby 's Provider appointment: 2 Month Well Child Check   Referrals: None    Maternal Diagnosis/Problem:  Hyperlactation   Infant Diagnosis/Problem:   difficulties feeding at the breast     Subjective:     Barb Valdez is a 27 y.o. female here for lactation care.     Concerns:   Maternal: Latch on difficulties , Oversupply , and Breastfeeding questions     HPI:   Pertinent  history: . LPI 36.0 weeks    Mother does not have a history of advanced maternal age, GDM, hypertension prior to pregnancy, GHTN, insulin resistance, multiple gestation, PCOS, thyroid disease, auto immune disease , placenta encapsulation, and breast surgery    Breast changes in pregnancy: Yes  History of breast surgeries: No    FEEDING HISTORY:    Previous Breastfeeding History: First baby.   [facility]  Course: Triple feeding initiated due to gestation age of the baby and continued at discharge.   Prior to consultation on 5/26/2023: Feeding every 2-3 hours throughout the day, usually closer to 3 hours. Offering the left breast, baby's preference. Topping off with expressed breastmilk, following guidelines provided by the hospital. Offering same volume when not latching for nighttime feedings. Pumping after or in place of brestfeeding, yielding up to 180mls between both breasts.    Prior to consultation on 6/2/2023: Feeding baby every 2-3 hours throughout the day and night. Latching to the left breast 3-4x daily. Offering 10-30mls after, reports baby is content and sleepy. If not breastfeeding, offering 60-70mls. Pumping 8x daily, yielding up to 50oz every 24 hours.   Currently 6/6/2023: Feeding every 2 hours in the day, every 3 hours at night. Mom has more milk than baby needs, by about 48oz now, pumping 200ml, 7x/day and storing it.      Both breasts: Yes, 10 minutes each side    Supplement: None     Nipple Shield Use: 24 mm and recommended by lactation outpatient , when started 6/2/23    Breast Pumping:   Frequency: 7x/day  Quantity Obtained: 200ml  Type of Pump: Spectra  Flange size/type: 24mm  NO pain with pumping    Maternal ROS:  Constitutional: No fever, chills. Feeling well  Breasts: No soreness of breasts and No soreness of nipples  Psychiatric: Managing ok  Mental Health: No mention of feeling irritable, agitated, angry, overwhelmed, apathetic, appetite changes, exhausted nor having sleep changes outside infant feeds/demands.  Current Outpatient Medications on File Prior to Visit   Medication Sig Dispense Refill    ibuprofen (MOTRIN) 800 MG Tab Take 1 Tablet by mouth every 8 hours as needed for Moderate Pain. 30 Tablet 0    docusate sodium 100 MG Cap Take 1 capsule by mouth 2 times a day as needed for Constipation. 30 Capsule 0    acetaminophen (TYLENOL) 500 MG Tab Take 2 Tablets by mouth every 6 hours as  needed for Mild Pain. 30 Tablet 0    Misc. Devices (BREAST PUMP) Misc For breastfeeding difficulty or milk storage 1 Each 0    Prenatal MV-Min-Fe Fum-FA-DHA (PRENATAL 1 PO) Take  by mouth.       No current facility-administered medications on file prior to visit.      Past Medical History:   Diagnosis Date    Active medical problems: none     Anxiety     Depression     IUD (intrauterine device) in place        Objective:     Maternal Physical Lactation Exam  General: No acute distress  Breasts: Plugged Duct - no evidence and Mastitis  - no S/S  Nipples: intact  Psychiatric: Normal mood and affect. Her behavior is normal. Judgment and thought content normal.  Mental Health: Did NOT exhibit sadness, crying, feeling overwhelmed, agitation or hypervigilance.      Assessment/Plan & Lactation Counseling:       Infant Weight History:     2023: 6#9.8oz  Milk Supply Available: oversupply      MATERNAL PERSONALIZED BREASTFEEDING PLAN  Discussed concerns and symptoms as listed above in assessment and guidance summarized below. Shared decision making was used between myself and the family for this encounter, home care, and follow up. Topics reviewed included:   Prematurity and LPIs (Late  Infants 36-37 Weeks)The premature or early baby born before 37 weeks of gestation is very prone to be sleepy at the breast, especially in the first several weeks. The most common reason for lack of milk transfer in newborns is simply falling asleep at the breast. It can sometimes be very difficult to determine whether sleepiness is due to insufficient transfer from a tongue tie/oral motor issues, or whether they are really primarily sleepy. If a baby needs to be woken frequently to feed during the day and/or falls asleep bottle feeding, they are NOT ready for prime time at the breast, so please don't expect them to transfer sufficiently at the breast.  Infants who are sleepy can take 3 and, very occasionally, 4-6 weeks after their  due date to be strong enough to transfer at every feeding. One way to know if this is the issue for this baby is if he has 1-2 feeds during the day when he is great at the breast, transfers sufficient volume, and does not need a bottle. This would typically occur after a good nap, or first feed in the am. I would encourage you to feed the baby less often at the breast, so mom can decrease the frequency of triple feeds. Pump and bottle feed several of the feeds. Once the baby demonstrates success with a few feeds at the breast, then gradually increase the # of feeds at the breast. Triple feeding should never last longer than a week, double feeding yes, not triple 24/7. Sustainability is critical.  Hyperlactation (Oversupply) This is a high rate of milk production often causing breast discomfort and or compelling moms to express beyond what the baby is taking.There is no defined clinical criteria. Infant can present with stopping to suckle/letting go, milk spraying in the baby's face, baby coughing or choking or breast refusal, struggle with initial letdown with gasping, fussiness, rapid weight gain (1# a week), excessive gas and refusing the second breast or breast with larger supply.   Causes could be:   Mother induced with pumping  Clare phenomenon breasts don't respond to feedback of fullness  Large storage capacity  Management  NS, 10 minutes each side Every 2-3 in the day, 3-4 at night  Follow up weight highly suggested while deliberately decreasing supply  Medications - sudafed 30mg once and may repeat  Milk may be donated if desired    Milk Supply is dependent on glandular tissue development, hormonal influences, how many times the baby removes milk and how well the breasts are emptied in a 24 hour period. This is a biological reality that we can NOT work around. If, for any reason, your baby is not latching, or you are not able to nurse, then it is important for you to remove the milk instead by pumping or  hand expression.  There's no magic trick, tea, food, drink, cookie or supplement that will increase your milk supply. One  must  effectively remove milk to continue to make and maximize milk. In the early days and weeks that can be 8+ times in 24 hours. For older babies, on average 6-7 + times in 24 hours.    Feeding:   Infant feeding well given current interval growth, guidelines to follow:  Feed your baby every 1.5-3 hours, more often if baby acts hungry.   Awaken baby for feeding if going over 3 hours in the day.   Daily goal: 8-12 feedings per 24 hours.    Given infants weight you may allow baby to go longer at night but that generally means shorter durations in the day.  Supplement:   No supplement is needed  Nipple shield (NS): We prefer the 24mm Medela.   Before applying, roll the shield in on itself (like a sombrero, and allow breast to be pulled  in to the shield tip).   The latch is very different from the bare breast, bring the baby to you and let them find the nipple shield and they will manage it, tuck them close once they find it, cheek against breast.   Once you and your baby are familiar with the NS, you may be able to just put it on the breast and latch the baby without any preparation.  Pumping Guidelines:  Both breasts   Pump 7 times in 24 hours looking to decrease duration and then frequency over time.   Type of Pump:  Spectra  Spectra Settings  Press letdown button when first starting         Cycle: 70 / Vacuum: L1 - L 5 (To comfort)  Once milk lets down, press letdown button again  Cycle: 54 / Vacuum: L1 - L12 (To comfort)  How long will vary woman to woman, typically 8-15 minutes  Flange Fit: Freely moving nipple in the tunnel with some movement of the areola.  Caution with Breast Massage: The word “Massage” means different things in the breastfeeding world. It is described and interpreted in so many different ways and unfortunately potentially harmful. The hands can be safe on a breast and   gentle to help in many ways but they also can be damaging when used in the wrong way.  Lymphatic drainage massage is appropriate,  open hand , lightly stroking only, and can be highly effective. The massage advice to knead , massage deeply , vibrate with marketed tools is  most concerning. Be gentle with this gland to not increase inflammation.   Breast Care  Plugs (a colloquial term for microscopic ductal inflammation and narrowing)  Inflammatory or infectious mastitis, breastpain including engorgement  or vasospasm  Breast rest - No Massage, don't overfeed of over pump, down regulate production if needed  Advil 800mg every 8 hours for 48 hours with food  Ice - 10 minutes  after feeding then every 30 minutes or as desired for repeating the ice. Don't put the ice directly on the skin.  May add Tylenol 1000mg every 8 hours for 48 hours in between the Advil dosing if needed for pain.  Recommended resources Physicians guide to breastfeeding, must up to date and accurate information available on breastfeeding  https://physicianguidetobreastfeeding.org/  Connect with other mothers:  Thank you for coming:)  Facebook:   [place] Breastfeeds: [email or web address] [email or web address]/[email or web address].[email or web address]/  Well-Nourished Babies (Private group for questions and support): https://www.facebook.com/groups/827352093778386/  Breastfeeding [place] including opportunity to weight your baby and do before and after weights WEIGHT CHECKS  Tuesdays 10am - 11am. Women's Health at [address] [place] [address], [facility] [address] [place] [address], 3rd floor conference room  Check your baby's weight, do a feeding and see how your baby is growing, visit with other mothers, plan on a walk or coffee date after group.  Please download Growth: Baby and Child for Apple or Child Growth Tracker for Koko[employer] if you would like to  document and follow your baby's growth curve.  Due to space limitations - limit strollers please (New c/section moms please use your stroller).  We would love to have dads stay,  but moms won't breastfeed if there are men in the room, sorry.  The room is generally scheduled for another event following group.  Please take all diapers with you     Follow up     Please call [phone] our voicemail line or the front office at [phone] to scheduled your next appointment.  Family is encouraged to e-mail or mychart us to update how the plan is working.    A total of 30 minutes was spent preparing to see the patient, obtaining and reviewing separately obtained history, performing a medically appropriate examination and evaluation, counseling and educating the family, documenting clinical information in the electronic health record, independently interpreting weighted feeds and infant growth results, communicating these results to the family and care coordination as detailed in the above note.       ROBB Darby.

## 2023-06-08 ENCOUNTER — OFFICE VISIT (OUTPATIENT)
Dept: OBGYN | Facility: CLINIC | Age: 28
End: 2023-06-08
Payer: COMMERCIAL

## 2023-06-08 DIAGNOSIS — N64.3 HYPERLACTATION: ICD-10-CM

## 2023-06-08 DIAGNOSIS — O92.70 LACTATION PROBLEM: ICD-10-CM

## 2023-06-08 PROCEDURE — 99215 OFFICE O/P EST HI 40 MIN: CPT | Performed by: NURSE PRACTITIONER

## 2023-06-08 NOTE — PROGRESS NOTES
Summary: Barb has been feeding every 2-3 hours during the day, average 3 hours at night. Latching 3-4x daily, both sides with the nipple shield. Offers 30mls after the breast. If not breastfeeding offering 60mls. Pumping 5-6x every 24 hours, yielding an average of 200mls at each session.   Today: Latched to the left breast, cross cradle with the nipple shield, transferred 56mls in 17 minutes. Latched to the right breast, football hold with the nipple shield, transferred 20mls in 9 minutes. Baby content to be dressed and placed in car seat.   Plan: Continue with frequent feedings during the day, every 2-3 hours. When latching no need to offer bottle unless baby is showing hunger cues then offer 20-30mls. If not breastfeeding offer 60-75mls. Discussed 75mls when leading up to bedtime and/or if baby sleeps longer stretch at night. Pump after every other breastfeeding session and in place of breastfeeding as desired.   Follow up:   Lactation appointment: As needed and Group Encouraged  Baby 's Provider appointment: 2 Month Well Child Check   Referrals: None    Maternal Diagnosis/Problem:  Lactation Problem: Hyperlactation and baby not latching for all feedings  Infant Diagnosis/Problem:  At risk for breastfeeding difficulties    Subjective:     Barb Valdez is a 27 y.o. female here for lactation care. She is here today with  (Fam) and baby, Ramonita .    Concerns: Weight Check and Feeding Evaluation with Nipple Shield       HPI:   Pertinent  history:  at 36 weeks    Mother does not have a history of advanced maternal age, GDM, hypertension prior to pregnancy, GHTN, insulin resistance, multiple gestation, PCOS, thyroid disease, auto immune disease , placenta encapsulation, and breast surgery    Breast changes in pregnancy: Yes  History of breast surgeries: No    FEEDING HISTORY:    Previous Breastfeeding History: First baby.   Hospital Course: Triple feeding initiated due to gestation age of the  baby and continued at discharge.   Prior to consultation on 5/26/2023: Feeding every 2-3 hours throughout the day, usually closer to 3 hours. Offering the left breast, baby's preference. Topping off with expressed breastmilk, following guidelines provided by the hospital. Offering same volume when not latching for nighttime feedings. Pumping after or in place of brestfeeding, yielding up to 180mls between both breasts.    Prior to consultation on 6/2/2023: Feeding baby every 2-3 hours throughout the day and night. Latching to the left breast 3-4x daily. Offering 10-30mls after, reports baby is content and sleepy. If not breastfeeding, offering 60-70mls. Pumping 8x daily, yielding up to 50oz every 24 hours.   Prior to consultation on 6/6/2023: Feeding every 2 hours in the day, every 3 hours at night. Mom has more milk than baby needs, by about 48oz now, pumping 200ml, 7x/day and storing it.    Currently 6/8/2023: Feeding every 2-3 hours during the day, average 3 hours at night. Latching 3-4x daily, both sides with the nipple shield. Offers 30mls after the breast. If not breastfeeding offering 60mls. Pumping 5-6x every 24 hours, yielding an average of 200mls at each session.     Both breasts: Yes when latching     Supplement: Supplementation initiated inpatient and Expressed breast milk  Quantity: 30-60mls  How given/devices: Bottle    Nipple Shield Use: 24 mm    Breast Pumping:  Frequency: 5-6x  Quantity Obtained: 150-200mls each session  Type of Pump: Spectra  Flange size/type: 24mm  Wearable: No  NO pain with pumping    Maternal ROS:  Constitutional: No fever, chills. Feeling well  Breasts: No soreness of breasts and No soreness of nipples  Psychiatric: Managing ok  Mental Health: No mention of feeling irritable, agitated, angry, overwhelmed, apathetic, appetite changes, exhausted nor having sleep changes outside infant feeds/demands.  Current Outpatient Medications on File Prior to Visit   Medication Sig Dispense  Refill    ibuprofen (MOTRIN) 800 MG Tab Take 1 Tablet by mouth every 8 hours as needed for Moderate Pain. 30 Tablet 0    docusate sodium 100 MG Cap Take 1 capsule by mouth 2 times a day as needed for Constipation. 30 Capsule 0    acetaminophen (TYLENOL) 500 MG Tab Take 2 Tablets by mouth every 6 hours as needed for Mild Pain. 30 Tablet 0    Misc. Devices (BREAST PUMP) Misc For breastfeeding difficulty or milk storage 1 Each 0    Prenatal MV-Min-Fe Fum-FA-DHA (PRENATAL 1 PO) Take  by mouth.       No current facility-administered medications on file prior to visit.      Past Medical History:   Diagnosis Date    Active medical problems: none     Anxiety     Depression     IUD (intrauterine device) in place        Objective:     Maternal Physical Lactation Exam  General: No acute distress  Breasts: Symmetrical  and Full  Nipples: intact  Psychiatric: Normal mood and affect. Her behavior is normal. Judgment and thought content normal.  Mental Health: Did NOT exhibit sadness, crying, feeling overwhelmed, agitation or hypervigilance.    Assessment/Plan & Lactation Counseling:     Infant Exam on Infant Chart    Infant Weight History:   05/21/2023: 6# 4.2oz  05/26/2023: 5# 10.8oz  06/02/2023: 6# 5.2oz  06/06/2023: 6# 9.8oz  06/08/2023: 6# 12.8oz    Infant Intake at Breast: L  56mls     R  20mls    Total: 75mls  Milk Transfer at this feeding:   Effective breastfeeding     Pumped: Not indicated   Initiation of Feeding: Infant initiates  Position of Feeding:    Right: football  Left: cross cradle  Attachment Achieved: rapidly with nipple shield   Nipple shield:  Size: 24mm       Introduced 6/2/2023  Latch achieved? Yes  Suck Pattern at the Breast: Suck burst and normal rest  Suck Pattern on the Bottle: Not Indicated     Behavior Following Observed Feeding: content  Nipple Pain: None     Latch: Mom latches independently  Suckling/Feeding: attaches, audible swallows, baby falls asleep, baby fed effectively, baby roots, elicits  EUFEMIA, intermittent swallows, and rhythmic  Sucking strength: Moderate   Sucking Rhythm Coordinated   Compression: WNL    Once latched, baby fell into a mature and fully integrated SSB pattern.    Swallowing No difficulty noted  If functional feeding, it is quiet, rhythmic, coordinated, organized, effeicent safe, satisfying and pleasurable for both parent and baby? No, significant improvement   Milk Supply Available: oversupply      INFANT BREASTFEEDING PLAN  Discussed with family present detailed plan for establishing/maintaining family specific goals with breastfeeding available on Mom’s My Chart   Infant specific:     MATERNAL PERSONALIZED BREASTFEEDING PLAN  Discussed concerns and symptoms as listed above in assessment and guidance summarized below. Shared decision making was used between myself and the family for this encounter, home care, and follow up. Topics reviewed included:   Feeding:   Infant feeding well given current interval growth, guidelines to follow:  Feed your baby every 1.5-3 hours, more often if baby acts hungry.   No need to wake for nighttime feedings.  Supplement:   Supplement with expressed milk  Routine supplement after breastfeeding is not needed. If still showing hunger cues offer 20-30mls.  If not breastfeeding offer 60-75mls. Discussed offering larger volumes leading up to bedtime and/or if baby goes longer between feedings at night.   Nipple shield (NS): Continue to use.   Pumping Guidelines:  Both breasts   If latching pump after every other feeding  In place of latching   Type of Pump:  Spectra  Spectra Settings  Press letdown button when first starting         Cycle: 70 / Vacuum: L1 - L 5 (To comfort)  Once milk lets down, press letdown button again  Cycle: 54 / Vacuum: L1 - L12 (To comfort)  To check the hours on the Spectra  https://AtHoc.RotaBan/how-to-check-the-hours-on-your-spectra/  How long will vary woman to woman, typically 8-15 minutes, or 1-2 minutes after flow  slows  Flange Fit: Freely moving nipple in the tunnel with some movement of the areola.  Storage (Acceptable guidelines for healthy term babies)  10 hours at room temp including your pieces, may rinse but not mandatory  8 days refrigerator, don't need  to refrigerate right away if using fresh pumped milk at the next feeding  Freezer 1 year  Deep freezer 2 years  American Academy or Pediatrics has said you may mix different temperature milks.   If baby drinks breast milk from a fresh or refrigerated bottle of breast milk,  you may return the unused portion to the refrigerator and use once at next feeding.   Answers to FAQs: https://www.infantrisk.com/category/breastfeeding  Alcohol & Breastfeeding: What's your time-to-zero?  Cough & Cold Medications while Breastfeeding  Vitamin D Supplementation and Breastfeeding  Antidepressant Use While Breastfeeding: What should I know?  Breastfeeding, Caffeine, and Energy Drinks  Connect with other mothers:  Facebook:   [place] Breastfeeds: https://www.MOLI.Voucherlink/[place].breastfeeds/  Well-Nourished Babies (Private group for questions and support): https://www.MOLI.com/groups/807813596271024/  Breastfeeding [place] including opportunity to weight your baby and do before and after weights WEIGHT CHECKS  Tuesdays 10am - 11am. Women's Health at [address] [place] [address], [facility] [address] [place] [address], 3rd floor conference room  Check your baby's weight, do a feeding and see how your baby is growing, visit with other mothers, plan on a walk or coffee date after group.  Please download Growth: Baby and Child for Apple or Child Growth Tracker for [employer] if you would like to  document and follow your baby's growth curve.  Due to space limitations - limit strollers please (New c/section moms please use your stroller).  We would love to have dads stay, but moms won't breastfeed if there are men in the room, sorry.  The room is generally scheduled for another event following group.  Please take all diapers with  you   ONLINE SUPPORT GROUPS  Postpartum [employer] (PSI) support groups are conducted using a [id number] support model and are not intended for those experiencing a mental health crisis. Groups are 90 minutes (1.5 hours) in length. The first ~30 minutes is providing information, education, and establishing group guidelines. The next ~60 minutes is “talk time,” in which group members share and talk with each other. Group members must be present for the group guidelines before joining in the discussion or “talk time.”     Follow up    Please call [phone] our voicemail line or the front office at [phone] to scheduled your next appointment.  Family is encouraged to e-mail or mychart us to update how the plan is working.    A total of 50 minutes, including infant assessment time,  was spent preparing to see the patient, obtaining and reviewing separately obtained history, performing a medically appropriate examination and evaluation, counseling and educating the family, referring and communicating with other health care professionals, documenting clinical information in the electronic health record, independently interpreting weighted feeds and infant growth results, communicating these results to the family and care coordination as detailed in the above note.       Michelle Anthony

## 2023-06-16 ENCOUNTER — OFFICE VISIT (OUTPATIENT)
Dept: OBGYN | Facility: CLINIC | Age: 28
End: 2023-06-16
Payer: COMMERCIAL

## 2023-06-16 DIAGNOSIS — O92.70 LACTATION PROBLEM: ICD-10-CM

## 2023-06-16 PROCEDURE — 99215 OFFICE O/P EST HI 40 MIN: CPT | Performed by: NURSE PRACTITIONER

## 2023-06-16 NOTE — PROGRESS NOTES
Summary: Barb has been feeding every 2-3 hours during the day, average 3-4 hours at night. Was latching regularly until 3 days ago when baby had a slow weight gain and 2mls transferred at group. Since then offering 60-70mls in the bottle with each feeding.   Today: Latched to the left breast, cross cradle with the nipple shield, transferred 68mls in 10 minutes. Attempted to latched to the right breast, football hold with the nipple shield, baby not interested. Baby content to be dressed and placed in car seat.   Plan: Continue with frequent feedings during the day, every 2-3 hours. Latch for some feedings, recommended every other feeding. When latching no need to offer bottle unless baby is showing hunger cues then offer 20-30mls. If not breastfeeding offer 70-75mls. Pump after most breastfeeding session and in place of breastfeeding. Continue with 5-6x daily.    Follow up:   Lactation appointment: As needed and Group Encouraged  Baby 's Provider appointment: 2 Month Well Child Check   Referrals: None    Maternal Diagnosis/Problem:  Lactation Problem: Hyperlactation and baby not latching for all feedings  Infant Diagnosis/Problem:  At risk for breastfeeding difficulties    Subjective:     Barb Valdez is a 27 y.o. female here for lactation care. She is here today with  (Fam) and baby, Ramonita .    Concerns: Weight Check and Feeding Evaluation with Nipple Shield     HPI:   Pertinent  history:  at 36 weeks    Mother does not have a history of advanced maternal age, GDM, hypertension prior to pregnancy, GHTN, insulin resistance, multiple gestation, PCOS, thyroid disease, auto immune disease , placenta encapsulation, and breast surgery    Breast changes in pregnancy: Yes  History of breast surgeries: No    FEEDING HISTORY:    Previous Breastfeeding History: First baby.   Hospital Course: Triple feeding initiated due to gestation age of the baby and continued at discharge.   Prior to consultation  on 5/26/2023: Feeding every 2-3 hours throughout the day, usually closer to 3 hours. Offering the left breast, baby's preference. Topping off with expressed breastmilk, following guidelines provided by the hospital. Offering same volume when not latching for nighttime feedings. Pumping after or in place of brestfeeding, yielding up to 180mls between both breasts.    Prior to consultation on 6/2/2023: Feeding baby every 2-3 hours throughout the day and night. Latching to the left breast 3-4x daily. Offering 10-30mls after, reports baby is content and sleepy. If not breastfeeding, offering 60-70mls. Pumping 8x daily, yielding up to 50oz every 24 hours.   Prior to consultation on 6/6/2023: Feeding every 2 hours in the day, every 3 hours at night. Mom has more milk than baby needs, by about 48oz now, pumping 200ml, 7x/day and storing it.    Prior to consultation on 6/8/2023: Feeding every 2-3 hours during the day, average 3 hours at night. Latching 3-4x daily, both sides with the nipple shield. Offers 30mls after the breast. If not breastfeeding offering 60mls. Pumping 5-6x every 24 hours, yielding an average of 200mls at each session.   Currently 6/16/2023: Feeding every 2-3 hours during the day, average 3-4 hours at night. Was latching regularly until 3 days ago when baby had a slow weight gain and 2mls transferred at group. Since then offering 60-70mls in the bottle with each feeding.      Both breasts: Yes when latching     Supplement: Supplementation initiated inpatient and Expressed breast milk  Quantity: 30-60mls  How given/devices: Bottle    Nipple Shield Use: 24 mm    Breast Pumping:  Frequency: 5-6x  Quantity Obtained: 150-280mls each session  Type of Pump: Spectra  Flange size/type: 24mm  Wearable: No  NO pain with pumping    Maternal ROS:  Constitutional: No fever, chills. Feeling well  Breasts: No soreness of breasts and No soreness of nipples  Psychiatric: Managing ok  Mental Health: No mention of feeling  irritable, agitated, angry, overwhelmed, apathetic, appetite changes, exhausted nor having sleep changes outside infant feeds/demands.  Current Outpatient Medications on File Prior to Visit   Medication Sig Dispense Refill    ibuprofen (MOTRIN) 800 MG Tab Take 1 Tablet by mouth every 8 hours as needed for Moderate Pain. 30 Tablet 0    docusate sodium 100 MG Cap Take 1 capsule by mouth 2 times a day as needed for Constipation. 30 Capsule 0    acetaminophen (TYLENOL) 500 MG Tab Take 2 Tablets by mouth every 6 hours as needed for Mild Pain. 30 Tablet 0    Misc. Devices (BREAST PUMP) Misc For breastfeeding difficulty or milk storage 1 Each 0    Prenatal MV-Min-Fe Fum-FA-DHA (PRENATAL 1 PO) Take  by mouth.       No current facility-administered medications on file prior to visit.      Past Medical History:   Diagnosis Date    Active medical problems: none     Anxiety     Depression     IUD (intrauterine device) in place        Objective:     Maternal Physical Lactation Exam  General: No acute distress  Breasts: Symmetrical  and Full  Nipples: intact  Psychiatric: Normal mood and affect. Her behavior is normal. Judgment and thought content normal.  Mental Health: Did NOT exhibit sadness, crying, feeling overwhelmed, agitation or hypervigilance.    Assessment/Plan & Lactation Counseling:     Infant Exam on Infant Chart    Infant Weight History:   05/21/2023: 6# 4.2oz  05/26/2023: 5# 10.8oz  06/02/2023: 6# 5.2oz  06/06/2023: 6# 9.8oz  06/08/2023: 6# 12.8oz  06/13/2023: 6# 14.2oz  06/16/2023: 7# 0.2oz    Infant Intake at Breast: L  68mls     R  Not Interested    Total: 68mls  Milk Transfer at this feeding:   Effective breastfeeding     Pumped: Not indicated   Initiation of Feeding: Infant initiates  Position of Feeding:    Right: football attempted   Left: cross cradle  Attachment Achieved: rapidly with nipple shield   Nipple shield:  Size: 24mm       Introduced 6/2/2023  Latch achieved? Yes  Suck Pattern at the Breast: Suck  burst and normal rest  Suck Pattern on the Bottle: Not Indicated     Behavior Following Observed Feeding: content  Nipple Pain: None     Latch: Mom latches independently  Suckling/Feeding: attaches, audible swallows, baby falls asleep, baby fed effectively, baby roots, elicits EUFEMIA, intermittent swallows, and rhythmic  Sucking strength: Moderate   Sucking Rhythm Coordinated   Compression: WNL    Once latched, baby fell into a mature and fully integrated SSB pattern.    Swallowing No difficulty noted  If functional feeding, it is quiet, rhythmic, coordinated, organized, effeicent safe, satisfying and pleasurable for both parent and baby? No, significant improvement   Milk Supply Available: oversupply      INFANT BREASTFEEDING PLAN  Discussed with family present detailed plan for establishing/maintaining family specific goals with breastfeeding available on Mom’s My Chart   Infant specific:     MATERNAL PERSONALIZED BREASTFEEDING PLAN  Discussed concerns and symptoms as listed above in assessment and guidance summarized below. Shared decision making was used between myself and the family for this encounter, home care, and follow up. Topics reviewed included:   Feeding:   Infant feeding well given current interval growth, guidelines to follow:  Feed your baby every 1.5-3 hours, more often if baby acts hungry.   No need to wake for nighttime feedings.  Supplement:   Supplement with expressed milk  Routine supplement after breastfeeding is not needed. If still showing hunger cues offer 20-30mls.  If not breastfeeding offer 70-75mls.   Nipple shield (NS): Continue to use.   Pumping Guidelines:  Both breasts 5-6x daily   After or in place of latching   Type of Pump:  Spectra  Spectra Settings  Press letdown button when first starting         Cycle: 70 / Vacuum: L1 - L 5 (To comfort)  Once milk lets down, press letdown button again  Cycle: 54 / Vacuum: L1 - L12 (To comfort)  To check the hours on the  Spectra  [email or web address]://[email or web address].it.[employer]/how-to-check-the-hours-on-your-spectra/  How long will vary woman to woman, typically 8-15 minutes, or 1-2 minutes after flow slows  Flange Fit: Freely moving nipple in the tunnel with some movement of the areola.  Connect with other mothers:  Facebook:   [place] [facility]: [email or web address]  Well-Nourished Babies (Private group for questions and support): [email or web address]  Breastfeeding [place] including opportunity to weight your baby and do before and after weights WEIGHT CHECKS  Tuesdays 10am - 11am. [facility] at [address] [place] [address], [address] [place] [address], 3rd floor conference room  Check your baby's weight, do a feeding and see how your baby is growing, visit with other mothers, plan on a walk or coffee date after group.  Please download Growth: Baby and Child for Apple or Child Growth Tracker for [employer] if you would like to  document and follow your baby's growth curve.  Due to space limitations - limit strollers please (New c/section moms please use your stroller).  We would love to have dads stay, but moms won't breastfeed if there are men in the room, sorry.  The room is generally scheduled for another event following group.  Please take all diapers with you   ONLINE SUPPORT GROUPS  [employer] ([employer]) support groups are conducted using a ogyg-gc-jikh support model and are not intended for those experiencing a mental health crisis. Groups are 90 minutes (1.5 hours) in length. The first ~30 minutes is providing information, education, and establishing group guidelines. The next ~60 minutes is “talk time,” in which group members share and talk with each other. Group members must be present for the group guidelines before joining in the discussion or “talk time.”     Follow up    Please call [phone] our voicemail line or the front office at [phone] to scheduled your next appointment.  Family is  encouraged to e-mail or mychart us to update how the plan is working.    A total of 50 minutes, including infant assessment time,  was spent preparing to see the patient, obtaining and reviewing separately obtained history, performing a medically appropriate examination and evaluation, counseling and educating the family, referring and communicating with other health care professionals, documenting clinical information in the electronic health record, independently interpreting weighted feeds and infant growth results, communicating these results to the family and care coordination as detailed in the above note.       Michelle Anthony

## 2023-07-10 ENCOUNTER — POST PARTUM (OUTPATIENT)
Dept: OBGYN | Facility: CLINIC | Age: 28
End: 2023-07-10
Payer: COMMERCIAL

## 2023-07-10 VITALS — WEIGHT: 123.6 LBS | BODY MASS INDEX: 21.22 KG/M2 | DIASTOLIC BLOOD PRESSURE: 83 MMHG | SYSTOLIC BLOOD PRESSURE: 105 MMHG

## 2023-07-10 NOTE — PROGRESS NOTES
Chief complaint: Postpartum visit    Barb Valdez is a 27 y.o. female who presents for her Postpartum Exam      HPI Comments: Pt presents for postpartum exam s/p vaginal, spontaneous on 3 5/21/2023. Patient is without complaints.  Has not had a cycle yet  Baby doing well.  Breastfeeding.  No depression/anxiety.  Not sexually active.  Lochia resolved.  Desires IUD for contraception.    Review of Systems:   Pertinent positives documented in HPI and all other systems reviewed & are negative    Last pap: 2021, up-to-date    Physical exam:   Vital measurements:  /83   Wt 123 lb 9.6 oz   Body mass index is 21.22 kg/m². (Goal BMI>18 <25)  Nursing note and vitals reviewed.  Gen: She is oriented to person, place, and time. She appears well-developed and well-nourished. No distress.   Abdomen: soft, nontender, nondistended, no rebound/guarding  Extremities: nontender, no clubbing, cyanosis or edema  Pelvic: Normal external female genitalia, well-healed perineum from delivery, cervix is normal, uterus approximately 8-10 weeks in size non tender, adnexa bilaterally normal with no dominant masses.  Episiotomy site healing well.    A/P: Postpartum status post vaginal, spontaneous  Doing well without complaints  Continue prenatal vitamins if breast feeding  May resume normal activities including exercise, tampons, and intercourse  Contraception IUD, Mirena.  Will need to schedule insertion as soon as possible  Follow up in February 2024 for annual exam or sooner as needed

## 2023-08-11 ENCOUNTER — GYNECOLOGY VISIT (OUTPATIENT)
Dept: OBGYN | Facility: CLINIC | Age: 28
End: 2023-08-11
Payer: COMMERCIAL

## 2023-08-11 VITALS — BODY MASS INDEX: 21.11 KG/M2 | WEIGHT: 123 LBS | DIASTOLIC BLOOD PRESSURE: 76 MMHG | SYSTOLIC BLOOD PRESSURE: 132 MMHG

## 2023-08-11 DIAGNOSIS — Z30.430 ENCOUNTER FOR IUD INSERTION: Primary | ICD-10-CM

## 2023-08-11 LAB
POCT INT CON NEG: NEGATIVE
POCT INT CON POS: POSITIVE
POCT URINE PREGNANCY TEST: NEGATIVE

## 2023-08-11 PROCEDURE — 81025 URINE PREGNANCY TEST: CPT | Performed by: OBSTETRICS & GYNECOLOGY

## 2023-08-11 PROCEDURE — 3078F DIAST BP <80 MM HG: CPT | Performed by: OBSTETRICS & GYNECOLOGY

## 2023-08-11 PROCEDURE — 58300 INSERT INTRAUTERINE DEVICE: CPT | Performed by: OBSTETRICS & GYNECOLOGY

## 2023-08-11 PROCEDURE — 3075F SYST BP GE 130 - 139MM HG: CPT | Performed by: OBSTETRICS & GYNECOLOGY

## 2023-08-11 NOTE — PROCEDURES
Procedure note; Mirena IUD insertion    Informed consent obtained, consent signed-risks discussed include; risk of pain bleeding infection perforation of the uterus possible pregnancy possible irregular menstrual cycles including increased bleeding or amenorrhea. If IUD perforates the uterus, the patient will require laparoscopy to retrieve the IUD.IUD does not protect against STDs or ectopic pregnancy.    Pregnancy test negative    Bimanual exam reveals axial to anteverted uterus nontender    Vaginal speculum placed    Betadine prep to cervix and vagina    Single-toothed tenaculum placed on the anterior lip of the cervix    Uterus sounded-8.5 cm and anteverted    Mirena IUD inserted without difficulty or complications    Strings trimmed    Patient tolerated procedure well    Followup in 2 weeks for IUD check up

## 2023-09-13 ENCOUNTER — APPOINTMENT (OUTPATIENT)
Dept: OBGYN | Facility: CLINIC | Age: 28
End: 2023-09-13
Payer: COMMERCIAL

## 2023-09-14 ENCOUNTER — GYNECOLOGY VISIT (OUTPATIENT)
Dept: OBGYN | Facility: CLINIC | Age: 28
End: 2023-09-14
Payer: COMMERCIAL

## 2023-09-14 VITALS — WEIGHT: 120 LBS | BODY MASS INDEX: 20.6 KG/M2 | DIASTOLIC BLOOD PRESSURE: 70 MMHG | SYSTOLIC BLOOD PRESSURE: 113 MMHG

## 2023-09-14 DIAGNOSIS — Z97.5 IUD (INTRAUTERINE DEVICE) IN PLACE: ICD-10-CM

## 2023-09-14 PROCEDURE — 99212 OFFICE O/P EST SF 10 MIN: CPT | Performed by: PHYSICIAN ASSISTANT

## 2023-09-14 PROCEDURE — 3074F SYST BP LT 130 MM HG: CPT | Performed by: PHYSICIAN ASSISTANT

## 2023-09-14 PROCEDURE — 3078F DIAST BP <80 MM HG: CPT | Performed by: PHYSICIAN ASSISTANT

## 2023-09-14 NOTE — NON-PROVIDER
GYN PROBLEM VISIT    CC:  Mirena IUD string check        HPI: Patient is a 28 y.o.  with hx  2023. Mirena IUD was inserted 2023. Pt reported bleeding 1 week after IUD insertion x5 days but denies any bleeding since. She had a Mirena IUD in the past without complications. Pt has resumed sexual activity. Lacerations sustained during delivery are well healed and all sutures have dissolved.     Pt is breastfeeding without supplemental formula. She has returned to work and regularly pumps. States that her baby is doing well, growing normally, and sleeping through the night. Pt reports adequate sleep and hydration. She is interested in trying to conceive again in 1 year.        ROS:   General: denies fever / chills  HEENT: denies sore throat:  CV: denies chest pain:  Repiratory: denies shortness of breath  GI: denies abdominal pain  : denies dysuria:    PFSH:  I personally reviewed the past medical and surgical histories.     Social History     Tobacco Use    Smoking status: Never    Smokeless tobacco: Never   Vaping Use    Vaping Use: Never used   Substance Use Topics    Alcohol use: Yes     Alcohol/week: 0.0 oz     Comment: occ 1 every few months    Drug use: No        Social History     Substance and Sexual Activity   Sexual Activity Yes    Partners: Male    Birth control/protection: Condom        ALLERGIES / REACTIONS:  No Known Allergies                        PHYSICAL EXAMINATION:  Vital Signs:   /70   Wt 120 lb   BMI 20.60 kg/m²     Gen: appears well, NAD  Respiratory: normal effort  Abdomen: Soft, non-tender.  Pelvic Exam:    Vulva: normal.    Urethra: normal.   Vagina: normal, no lesions.    Cervix: IUD strings x2 visualized. Closed cervix with parous os.   Uterus: normal size, shape and contour.    left adnexa: non-tender, no masses.   right adnexa: non-tender, no masses.   Perineum: normal.  Chaperone Present: yes    ASSESSMENT AND PLAN:  28 y.o.      IUD check  - Confirmation  of IUD placement with string visualization  - Return precautions given for heavy abnormal vaginal bleeding or severe abd cramping  - Advised to remove IUD 2-3 months prior to desired conception     Follow up for annual and PAP next year     Mary Puente, Student

## 2023-09-14 NOTE — PROGRESS NOTES
Pt seen by me and PA student for string check. Please see note per ALICE Jacobo.  Mia Cast P.A.-C.

## 2023-10-02 ENCOUNTER — IMMUNIZATION (OUTPATIENT)
Dept: OCCUPATIONAL MEDICINE | Facility: CLINIC | Age: 28
End: 2023-10-02

## 2023-10-02 DIAGNOSIS — Z23 NEED FOR VACCINATION: Primary | ICD-10-CM

## 2023-10-02 PROCEDURE — 90686 IIV4 VACC NO PRSV 0.5 ML IM: CPT | Performed by: PREVENTIVE MEDICINE

## 2023-11-21 ENCOUNTER — OFFICE VISIT (OUTPATIENT)
Dept: URGENT CARE | Facility: CLINIC | Age: 28
End: 2023-11-21
Payer: COMMERCIAL

## 2023-11-21 VITALS
RESPIRATION RATE: 14 BRPM | TEMPERATURE: 96.4 F | DIASTOLIC BLOOD PRESSURE: 78 MMHG | HEIGHT: 64 IN | HEART RATE: 97 BPM | SYSTOLIC BLOOD PRESSURE: 120 MMHG | WEIGHT: 120 LBS | BODY MASS INDEX: 20.49 KG/M2 | OXYGEN SATURATION: 98 %

## 2023-11-21 DIAGNOSIS — R68.89 FLU-LIKE SYMPTOMS: ICD-10-CM

## 2023-11-21 DIAGNOSIS — N61.0 MASTITIS: ICD-10-CM

## 2023-11-21 PROCEDURE — 0241U POCT CEPHEID COV-2, FLU A/B, RSV - PCR: CPT | Performed by: PHYSICIAN ASSISTANT

## 2023-11-21 PROCEDURE — 3078F DIAST BP <80 MM HG: CPT | Performed by: PHYSICIAN ASSISTANT

## 2023-11-21 PROCEDURE — 99213 OFFICE O/P EST LOW 20 MIN: CPT | Performed by: PHYSICIAN ASSISTANT

## 2023-11-21 PROCEDURE — 3074F SYST BP LT 130 MM HG: CPT | Performed by: PHYSICIAN ASSISTANT

## 2023-11-21 RX ORDER — CEPHALEXIN 500 MG/1
500 CAPSULE ORAL 4 TIMES DAILY
Qty: 28 CAPSULE | Refills: 0 | Status: SHIPPED | OUTPATIENT
Start: 2023-11-21 | End: 2023-11-28

## 2023-11-21 ASSESSMENT — ENCOUNTER SYMPTOMS
CHILLS: 1
EYE DISCHARGE: 0
FEVER: 1
EYE PAIN: 0
SHORTNESS OF BREATH: 0
SORE THROAT: 0
DIAPHORESIS: 0
COUGH: 0
EYE REDNESS: 0
DIARRHEA: 0
CONSTIPATION: 0
WHEEZING: 0
NAUSEA: 0
DIZZINESS: 0
SINUS PAIN: 0
ABDOMINAL PAIN: 0
HEADACHES: 0
VOMITING: 0

## 2023-11-22 NOTE — PROGRESS NOTES
"  Subjective:     Barb Valdez  is a 28 y.o. female who presents for Fever, Body Aches, and Breast Problem (Possible mastitis  )       She presents today's with fever, body aches and generalized fatigue that has been ongoing over the past 2 days.  She has been using Tylenol for her fevers, did have a recorded fever shortly after taking a Tylenol dose of 100.2.  Notes that her daughter did have RSV last week.  This time she denies sinus congestion, no ear pain, no sore throat.  No chest pain or shortness breath, no nausea vomiting, no dumping, no diarrhea.    She also has complaints of left-sided breast pain.  She currently is breast-feeding.  Denies any breast drainage or discharge but there is tenderness.     Review of Systems   Constitutional:  Positive for chills, fever and malaise/fatigue. Negative for diaphoresis.   HENT:  Negative for congestion, ear discharge, sinus pain and sore throat.    Eyes:  Negative for pain, discharge and redness.   Respiratory:  Negative for cough, shortness of breath and wheezing.    Cardiovascular:  Negative for chest pain.   Gastrointestinal:  Negative for abdominal pain, constipation, diarrhea, nausea and vomiting.   Skin:         Left-sided breast discomfort   Neurological:  Negative for dizziness and headaches.      No Known Allergies  Past Medical History:   Diagnosis Date    Active medical problems: none     Anxiety     Depression     IUD (intrauterine device) in place         Objective:   /78 (BP Location: Left arm, Patient Position: Sitting, BP Cuff Size: Adult)   Pulse 97   Temp (!) 35.8 °C (96.4 °F) (Temporal)   Resp 14   Ht 1.626 m (5' 4\")   Wt 54.4 kg (120 lb)   SpO2 98%   Breastfeeding Yes   BMI 20.60 kg/m²   Physical Exam  Vitals and nursing note reviewed. Exam conducted with a chaperone present.   Constitutional:       General: She is not in acute distress.     Appearance: Normal appearance. She is not ill-appearing, toxic-appearing or diaphoretic. "   HENT:      Head: Normocephalic.      Right Ear: Tympanic membrane, ear canal and external ear normal. There is no impacted cerumen.      Left Ear: Tympanic membrane, ear canal and external ear normal. There is no impacted cerumen.      Nose: No congestion or rhinorrhea.      Mouth/Throat:      Mouth: Mucous membranes are moist.      Pharynx: No oropharyngeal exudate or posterior oropharyngeal erythema.   Eyes:      General:         Right eye: No discharge.         Left eye: No discharge.      Conjunctiva/sclera: Conjunctivae normal.   Cardiovascular:      Rate and Rhythm: Normal rate and regular rhythm.   Pulmonary:      Effort: Pulmonary effort is normal. No respiratory distress.      Breath sounds: Normal breath sounds. No stridor. No wheezing or rhonchi.   Musculoskeletal:      Cervical back: Neck supple.   Lymphadenopathy:      Cervical: No cervical adenopathy.   Skin:            Comments: Localized skin erythema present over the above marked region.  Tenderness to palpation, firmness present over the above marked region.  No induration to suggest abscess.  No nipple discharge or abnormalities.   Neurological:      General: No focal deficit present.      Mental Status: She is alert and oriented to person, place, and time.   Psychiatric:         Mood and Affect: Mood normal.         Behavior: Behavior normal.         Thought Content: Thought content normal.         Judgment: Judgment normal.           Diagnostic testing:    Cephid COVID/Influenza/RSV -all negative, notified via [facility] message    Assessment/Plan:     Encounter Diagnoses   Name Primary?    Mastitis     Flu-like symptoms           Plan for care for today's complaint includes  concerning the patient on Keflex for mastitis of the left breast.  No evidence of abscess on today's examination.  No evidence of abnormal nipple discharge on exam today.  Discussed with the patient today that she needs to continue to breast-feed out of the left breast but  limit pumping from the left breast.  Signs and symptoms of worsening disease were discussed.  COVID/influenza/RSV testing were negative today.  Continue to monitor symptoms and return to urgent care or follow-up with primary care provider if symptoms remain ongoing.  Follow-up in the emergency department if symptoms become severe, ER precautions discussed in office today..  Prescription for Keflex provided.    See AVS Instructions below for written guidance provided to patient on after-visit management and care in addition to our verbal discussion during the visit.    Please note that this dictation was created using voice recognition software. I have attempted to correct all errors, but there may be sound-alike, spelling, grammar and possibly content errors that I did not discover before finalizing the note.    Gideon Prajapati PA-C

## 2023-12-21 ENCOUNTER — HOSPITAL ENCOUNTER (OUTPATIENT)
Dept: LAB | Facility: MEDICAL CENTER | Age: 28
End: 2023-12-21
Attending: NURSE PRACTITIONER
Payer: COMMERCIAL

## 2023-12-21 LAB
ALBUMIN SERPL BCP-MCNC: 4.5 G/DL (ref 3.2–4.9)
ALBUMIN/GLOB SERPL: 1.7 G/DL
ALP SERPL-CCNC: 104 U/L (ref 30–99)
ALT SERPL-CCNC: 12 U/L (ref 2–50)
ANION GAP SERPL CALC-SCNC: 10 MMOL/L (ref 7–16)
AST SERPL-CCNC: 13 U/L (ref 12–45)
BASOPHILS # BLD AUTO: 0.9 % (ref 0–1.8)
BASOPHILS # BLD: 0.05 K/UL (ref 0–0.12)
BILIRUB SERPL-MCNC: 0.3 MG/DL (ref 0.1–1.5)
BUN SERPL-MCNC: 12 MG/DL (ref 8–22)
CALCIUM ALBUM COR SERPL-MCNC: 9.3 MG/DL (ref 8.5–10.5)
CALCIUM SERPL-MCNC: 9.7 MG/DL (ref 8.5–10.5)
CHLORIDE SERPL-SCNC: 106 MMOL/L (ref 96–112)
CO2 SERPL-SCNC: 26 MMOL/L (ref 20–33)
CREAT SERPL-MCNC: 0.59 MG/DL (ref 0.5–1.4)
EOSINOPHIL # BLD AUTO: 0.2 K/UL (ref 0–0.51)
EOSINOPHIL NFR BLD: 3.6 % (ref 0–6.9)
ERYTHROCYTE [DISTWIDTH] IN BLOOD BY AUTOMATED COUNT: 39.8 FL (ref 35.9–50)
EST. AVERAGE GLUCOSE BLD GHB EST-MCNC: 103 MG/DL
GFR SERPLBLD CREATININE-BSD FMLA CKD-EPI: 126 ML/MIN/1.73 M 2
GLOBULIN SER CALC-MCNC: 2.6 G/DL (ref 1.9–3.5)
GLUCOSE SERPL-MCNC: 99 MG/DL (ref 65–99)
HBA1C MFR BLD: 5.2 % (ref 4–5.6)
HCT VFR BLD AUTO: 45.8 % (ref 37–47)
HGB BLD-MCNC: 14.8 G/DL (ref 12–16)
IMM GRANULOCYTES # BLD AUTO: 0.01 K/UL (ref 0–0.11)
IMM GRANULOCYTES NFR BLD AUTO: 0.2 % (ref 0–0.9)
LYMPHOCYTES # BLD AUTO: 1.88 K/UL (ref 1–4.8)
LYMPHOCYTES NFR BLD: 34.1 % (ref 22–41)
MCH RBC QN AUTO: 29.1 PG (ref 27–33)
MCHC RBC AUTO-ENTMCNC: 32.3 G/DL (ref 32.2–35.5)
MCV RBC AUTO: 90.2 FL (ref 81.4–97.8)
MONOCYTES # BLD AUTO: 0.41 K/UL (ref 0–0.85)
MONOCYTES NFR BLD AUTO: 7.4 % (ref 0–13.4)
NEUTROPHILS # BLD AUTO: 2.97 K/UL (ref 1.82–7.42)
NEUTROPHILS NFR BLD: 53.8 % (ref 44–72)
NRBC # BLD AUTO: 0 K/UL
NRBC BLD-RTO: 0 /100 WBC (ref 0–0.2)
PLATELET # BLD AUTO: 301 K/UL (ref 164–446)
PMV BLD AUTO: 9.5 FL (ref 9–12.9)
POTASSIUM SERPL-SCNC: 4 MMOL/L (ref 3.6–5.5)
PROT SERPL-MCNC: 7.1 G/DL (ref 6–8.2)
RBC # BLD AUTO: 5.08 M/UL (ref 4.2–5.4)
SODIUM SERPL-SCNC: 142 MMOL/L (ref 135–145)
T3 SERPL-MCNC: 120 NG/DL (ref 60–181)
T4 FREE SERPL-MCNC: 1.57 NG/DL (ref 0.93–1.7)
TSH SERPL DL<=0.005 MIU/L-ACNC: 1.05 UIU/ML (ref 0.38–5.33)
WBC # BLD AUTO: 5.5 K/UL (ref 4.8–10.8)

## 2023-12-21 PROCEDURE — 84439 ASSAY OF FREE THYROXINE: CPT

## 2023-12-21 PROCEDURE — 84480 ASSAY TRIIODOTHYRONINE (T3): CPT

## 2023-12-21 PROCEDURE — 82652 VIT D 1 25-DIHYDROXY: CPT

## 2023-12-21 PROCEDURE — 36415 COLL VENOUS BLD VENIPUNCTURE: CPT

## 2023-12-21 PROCEDURE — 85025 COMPLETE CBC W/AUTO DIFF WBC: CPT

## 2023-12-21 PROCEDURE — 84443 ASSAY THYROID STIM HORMONE: CPT

## 2023-12-21 PROCEDURE — 83036 HEMOGLOBIN GLYCOSYLATED A1C: CPT

## 2023-12-21 PROCEDURE — 80053 COMPREHEN METABOLIC PANEL: CPT

## 2023-12-22 LAB — 1,25(OH)2D3 SERPL-MCNC: 80.8 PG/ML (ref 19.9–79.3)

## 2023-12-28 ENCOUNTER — OFFICE VISIT (OUTPATIENT)
Dept: URGENT CARE | Facility: CLINIC | Age: 28
End: 2023-12-28
Payer: COMMERCIAL

## 2023-12-28 VITALS
TEMPERATURE: 98.7 F | WEIGHT: 125 LBS | HEART RATE: 82 BPM | SYSTOLIC BLOOD PRESSURE: 98 MMHG | HEIGHT: 64 IN | DIASTOLIC BLOOD PRESSURE: 60 MMHG | RESPIRATION RATE: 16 BRPM | BODY MASS INDEX: 21.34 KG/M2 | OXYGEN SATURATION: 98 %

## 2023-12-28 DIAGNOSIS — R42 DIZZINESS: ICD-10-CM

## 2023-12-28 LAB
APPEARANCE UR: CLEAR
BILIRUB UR STRIP-MCNC: NEGATIVE MG/DL
COLOR UR AUTO: YELLOW
GLUCOSE BLD-MCNC: 85 MG/DL (ref 65–99)
GLUCOSE UR STRIP.AUTO-MCNC: NEGATIVE MG/DL
KETONES UR STRIP.AUTO-MCNC: 15 MG/DL
LEUKOCYTE ESTERASE UR QL STRIP.AUTO: NORMAL
NITRITE UR QL STRIP.AUTO: NEGATIVE
PH UR STRIP.AUTO: 5.5 [PH] (ref 5–8)
POCT INT CON NEG: NEGATIVE
POCT INT CON POS: POSITIVE
POCT URINE PREGNANCY TEST: NEGATIVE
PROT UR QL STRIP: NEGATIVE MG/DL
RBC UR QL AUTO: NORMAL
SP GR UR STRIP.AUTO: <=1.005
UROBILINOGEN UR STRIP-MCNC: 0.2 MG/DL

## 2023-12-28 PROCEDURE — 3078F DIAST BP <80 MM HG: CPT

## 2023-12-28 PROCEDURE — 81025 URINE PREGNANCY TEST: CPT

## 2023-12-28 PROCEDURE — 3074F SYST BP LT 130 MM HG: CPT

## 2023-12-28 PROCEDURE — 99213 OFFICE O/P EST LOW 20 MIN: CPT

## 2023-12-28 PROCEDURE — 81002 URINALYSIS NONAUTO W/O SCOPE: CPT

## 2023-12-28 PROCEDURE — 82962 GLUCOSE BLOOD TEST: CPT

## 2023-12-28 PROCEDURE — 93000 ELECTROCARDIOGRAM COMPLETE: CPT

## 2023-12-28 RX ORDER — FLUOXETINE HCL 20 MG
CAPSULE ORAL
COMMUNITY
Start: 2023-12-21 | End: 2024-02-09

## 2023-12-28 ASSESSMENT — ENCOUNTER SYMPTOMS
DIZZINESS: 1
SPEECH CHANGE: 0
DOUBLE VISION: 0
ABDOMINAL PAIN: 0
EYE PAIN: 0
FLANK PAIN: 0
FEVER: 0
VOMITING: 0
COUGH: 0
NAUSEA: 1
SORE THROAT: 0
SHORTNESS OF BREATH: 0
CHILLS: 0
STRIDOR: 0
PHOTOPHOBIA: 0
WEAKNESS: 0
PALPITATIONS: 0
BLOOD IN STOOL: 0
MYALGIAS: 0
SPUTUM PRODUCTION: 0
WHEEZING: 0
LOSS OF CONSCIOUSNESS: 0
SENSORY CHANGE: 0
FOCAL WEAKNESS: 0
DIARRHEA: 0
TINGLING: 0
NECK PAIN: 0
HEADACHES: 0

## 2023-12-28 ASSESSMENT — FIBROSIS 4 INDEX: FIB4 SCORE: 0.35

## 2023-12-28 NOTE — LETTER
[facility]  [address]  [place] [address]     December 28, 2023    Patient: Barb Valdez   YOB: 1995   Date of Visit: 12/28/2023       To Whom It May Concern:    Barb Valdez was seen and treated in our department on 12/28/2023.     Please excuse Barb from 12/28/23.         Sincerely,     ROBB Jones.

## 2023-12-29 ENCOUNTER — APPOINTMENT (OUTPATIENT)
Dept: RADIOLOGY | Facility: MEDICAL CENTER | Age: 28
End: 2023-12-29
Attending: EMERGENCY MEDICINE
Payer: COMMERCIAL

## 2023-12-29 ENCOUNTER — HOSPITAL ENCOUNTER (EMERGENCY)
Facility: MEDICAL CENTER | Age: 28
End: 2023-12-29
Attending: EMERGENCY MEDICINE
Payer: COMMERCIAL

## 2023-12-29 VITALS
HEART RATE: 98 BPM | RESPIRATION RATE: 20 BRPM | DIASTOLIC BLOOD PRESSURE: 69 MMHG | BODY MASS INDEX: 21.34 KG/M2 | WEIGHT: 125 LBS | SYSTOLIC BLOOD PRESSURE: 113 MMHG | HEIGHT: 64 IN | TEMPERATURE: 97.7 F | OXYGEN SATURATION: 94 %

## 2023-12-29 DIAGNOSIS — R00.2 PALPITATIONS: ICD-10-CM

## 2023-12-29 LAB
ALBUMIN SERPL BCP-MCNC: 4.3 G/DL (ref 3.2–4.9)
ALBUMIN/GLOB SERPL: 1.7 G/DL
ALP SERPL-CCNC: 98 U/L (ref 30–99)
ALT SERPL-CCNC: 10 U/L (ref 2–50)
ANION GAP SERPL CALC-SCNC: 11 MMOL/L (ref 7–16)
AST SERPL-CCNC: 10 U/L (ref 12–45)
BASOPHILS # BLD AUTO: 0.7 % (ref 0–1.8)
BASOPHILS # BLD: 0.05 K/UL (ref 0–0.12)
BILIRUB SERPL-MCNC: 0.3 MG/DL (ref 0.1–1.5)
BUN SERPL-MCNC: 12 MG/DL (ref 8–22)
CALCIUM ALBUM COR SERPL-MCNC: 8.8 MG/DL (ref 8.5–10.5)
CALCIUM SERPL-MCNC: 9 MG/DL (ref 8.5–10.5)
CHLORIDE SERPL-SCNC: 107 MMOL/L (ref 96–112)
CO2 SERPL-SCNC: 23 MMOL/L (ref 20–33)
CREAT SERPL-MCNC: 0.59 MG/DL (ref 0.5–1.4)
D DIMER PPP IA.FEU-MCNC: <0.27 UG/ML (FEU) (ref 0–0.5)
EKG IMPRESSION: NORMAL
EOSINOPHIL # BLD AUTO: 0.07 K/UL (ref 0–0.51)
EOSINOPHIL NFR BLD: 1 % (ref 0–6.9)
ERYTHROCYTE [DISTWIDTH] IN BLOOD BY AUTOMATED COUNT: 37.6 FL (ref 35.9–50)
EST. AVERAGE GLUCOSE BLD GHB EST-MCNC: 97 MG/DL
GFR SERPLBLD CREATININE-BSD FMLA CKD-EPI: 126 ML/MIN/1.73 M 2
GLOBULIN SER CALC-MCNC: 2.5 G/DL (ref 1.9–3.5)
GLUCOSE SERPL-MCNC: 146 MG/DL (ref 65–99)
HBA1C MFR BLD: 5 % (ref 4–5.6)
HCG SERPL QL: NEGATIVE
HCT VFR BLD AUTO: 43.7 % (ref 37–47)
HGB BLD-MCNC: 14.6 G/DL (ref 12–16)
IMM GRANULOCYTES # BLD AUTO: 0.02 K/UL (ref 0–0.11)
IMM GRANULOCYTES NFR BLD AUTO: 0.3 % (ref 0–0.9)
LYMPHOCYTES # BLD AUTO: 1.45 K/UL (ref 1–4.8)
LYMPHOCYTES NFR BLD: 20.5 % (ref 22–41)
MCH RBC QN AUTO: 29.6 PG (ref 27–33)
MCHC RBC AUTO-ENTMCNC: 33.4 G/DL (ref 32.2–35.5)
MCV RBC AUTO: 88.6 FL (ref 81.4–97.8)
MONOCYTES # BLD AUTO: 0.47 K/UL (ref 0–0.85)
MONOCYTES NFR BLD AUTO: 6.6 % (ref 0–13.4)
NEUTROPHILS # BLD AUTO: 5.02 K/UL (ref 1.82–7.42)
NEUTROPHILS NFR BLD: 70.9 % (ref 44–72)
NRBC # BLD AUTO: 0 K/UL
NRBC BLD-RTO: 0 /100 WBC (ref 0–0.2)
PLATELET # BLD AUTO: 283 K/UL (ref 164–446)
PMV BLD AUTO: 9.5 FL (ref 9–12.9)
POTASSIUM SERPL-SCNC: 4 MMOL/L (ref 3.6–5.5)
PROT SERPL-MCNC: 6.8 G/DL (ref 6–8.2)
RBC # BLD AUTO: 4.93 M/UL (ref 4.2–5.4)
SODIUM SERPL-SCNC: 141 MMOL/L (ref 135–145)
TSH SERPL DL<=0.005 MIU/L-ACNC: 0.85 UIU/ML (ref 0.38–5.33)
WBC # BLD AUTO: 7.1 K/UL (ref 4.8–10.8)

## 2023-12-29 PROCEDURE — 83036 HEMOGLOBIN GLYCOSYLATED A1C: CPT

## 2023-12-29 PROCEDURE — 71045 X-RAY EXAM CHEST 1 VIEW: CPT

## 2023-12-29 PROCEDURE — 80053 COMPREHEN METABOLIC PANEL: CPT

## 2023-12-29 PROCEDURE — 99284 EMERGENCY DEPT VISIT MOD MDM: CPT

## 2023-12-29 PROCEDURE — 36415 COLL VENOUS BLD VENIPUNCTURE: CPT

## 2023-12-29 PROCEDURE — 84703 CHORIONIC GONADOTROPIN ASSAY: CPT

## 2023-12-29 PROCEDURE — 700105 HCHG RX REV CODE 258: Performed by: EMERGENCY MEDICINE

## 2023-12-29 PROCEDURE — 93005 ELECTROCARDIOGRAM TRACING: CPT | Performed by: EMERGENCY MEDICINE

## 2023-12-29 PROCEDURE — 85379 FIBRIN DEGRADATION QUANT: CPT

## 2023-12-29 PROCEDURE — 84443 ASSAY THYROID STIM HORMONE: CPT

## 2023-12-29 PROCEDURE — 85025 COMPLETE CBC W/AUTO DIFF WBC: CPT

## 2023-12-29 RX ORDER — SODIUM CHLORIDE, SODIUM LACTATE, POTASSIUM CHLORIDE, CALCIUM CHLORIDE 600; 310; 30; 20 MG/100ML; MG/100ML; MG/100ML; MG/100ML
1000 INJECTION, SOLUTION INTRAVENOUS ONCE
Status: COMPLETED | OUTPATIENT
Start: 2023-12-29 | End: 2023-12-29

## 2023-12-29 RX ADMIN — SODIUM CHLORIDE, POTASSIUM CHLORIDE, SODIUM LACTATE AND CALCIUM CHLORIDE 1000 ML: 600; 310; 30; 20 INJECTION, SOLUTION INTRAVENOUS at 13:04

## 2023-12-29 ASSESSMENT — FIBROSIS 4 INDEX: FIB4 SCORE: 0.35

## 2023-12-29 NOTE — PROGRESS NOTES
Subjective     Barb Valdez is a 28 y.o. female who presents with dizziness and lightheadedness x3 weeks.     HPI:   Barb is a 29yo female presenting today for dizziness x3 weeks. Reports associated lightheadedness. The dizziness is aggravated by position changes. Denies bleeding. No bloody stools. Reports recent new medication of Prozac. Denies headache. Denies head or neck trauma. No diplopia, mild visual blurriness associated with the dizziness. No hearing changes or ear pain. Denies speech changes. No vomiting or syncope. Denies fevers, no numbness/tingling or sensation loss. Denies chest pain or shortness of breath. No urinary symptoms. Recent blood work including CBC and CMP 2 months ago without abnormalities. No recent diet changes. She is breastfeeding.     Review of Systems   Constitutional:  Negative for chills, fever and malaise/fatigue.   HENT:  Negative for sore throat.    Eyes:  Negative for double vision, photophobia and pain.   Respiratory:  Negative for cough, sputum production, shortness of breath, wheezing and stridor.    Cardiovascular:  Negative for chest pain, palpitations and leg swelling.   Gastrointestinal:  Positive for nausea. Negative for abdominal pain, blood in stool, diarrhea, melena and vomiting.   Genitourinary:  Negative for dysuria, flank pain, frequency, hematuria and urgency.   Musculoskeletal:  Negative for myalgias and neck pain.   Neurological:  Positive for dizziness. Negative for tingling, sensory change, speech change, focal weakness, loss of consciousness, weakness and headaches.     Past Medical History:   Diagnosis Date    Active medical problems: none     Anxiety     Depression     IUD (intrauterine device) in place       History reviewed. No pertinent surgical history.     Medications, Allergies, and current problem list reviewed today in Epic.      Objective     BP 98/60 (BP Location: Left arm, Patient Position: Standing, BP Cuff Size: Adult)   Pulse 82   Temp  "37.1 °C (98.7 °F) (Temporal)   Resp 16   Ht 1.626 m (5' 4\")   Wt 56.7 kg (125 lb)   SpO2 98%   BMI 21.46 kg/m²      Physical Exam  Vitals reviewed.   Constitutional:       General: She is not in acute distress.  HENT:      Right Ear: Tympanic membrane, ear canal and external ear normal.      Left Ear: Tympanic membrane, ear canal and external ear normal.      Nose: Nose normal.      Mouth/Throat:      Mouth: Mucous membranes are moist.      Pharynx: No oropharyngeal exudate or posterior oropharyngeal erythema.   Eyes:      Extraocular Movements: Extraocular movements intact.      Conjunctiva/sclera: Conjunctivae normal.      Pupils: Pupils are equal, round, and reactive to light.   Cardiovascular:      Rate and Rhythm: Normal rate and regular rhythm.      Pulses: Normal pulses.      Heart sounds: Normal heart sounds.   Pulmonary:      Effort: Pulmonary effort is normal. No respiratory distress.      Breath sounds: Normal breath sounds. No stridor. No wheezing, rhonchi or rales.   Abdominal:      General: Abdomen is flat.   Musculoskeletal:      Cervical back: Normal range of motion and neck supple. No rigidity or tenderness.   Lymphadenopathy:      Cervical: No cervical adenopathy.   Skin:     General: Skin is warm and dry.   Neurological:      Mental Status: She is alert. Mental status is at baseline.   Psychiatric:         Mood and Affect: Mood normal.         Behavior: Behavior normal.         Thought Content: Thought content normal.       Results for orders placed or performed in visit on 12/28/23   POCT Urinalysis   Result Value Ref Range    POC Color yellow Negative    POC Appearance clear Negative    POC Glucose negative Negative mg/dL    POC Bilirubin negative Negative mg/dL    POC Ketones 15 Negative mg/dL    POC Specific Gravity <=1.005 <1.005 - >1.030    POC Blood trace - intact Negative    POC Urine PH 5.5 5.0 - 8.0    POC Protein negative Negative mg/dL    POC Urobiligen 0.2 Negative (0.2) mg/dL    " POC Nitrites negative Negative    POC Leukocyte Esterase neagtive Negative   POCT Pregnancy   Result Value Ref Range    POC Urine Pregnancy Test Negative     Internal Control Positive Positive     Internal Control Negative Negative    POCT Glucose   Result Value Ref Range    Glucose - Accu-Ck 85 65 - 99 mg/dL      EKG: NSR rate: 71, normal axis, borderline QT prolongation, no evidence of ischemia or hypertrophy.     Assessment & Plan     1. Dizziness   - POCT Urinalysis  - POCT Pregnancy  - EKG - Clinic Performed  - POCT Glucose  - Referral to Neurology  - Orthostatic vital signs     MDM/Comments:   Patient has stable vital signs and is non-toxic appearing with benign examination findings.   Neurological examination without abnormality. Absence of alarm features/red flags that warrant transfer to emergency department at this time. Recent blood work without evidence of anemia or electrolyte abnormality. In office orthostatic vital signs normal, glucose within normal limits. Urinalysis without evidence of infection.   EKG with borderline QT prolongation, possibly related to side effect of Prozac. Patient advised to follow up ASAP with prescribing provider of Prozac to discuss dose. Advised to take Prozac every other day until she is able to follow up with her provider. Will avoid treatment of dizziness with Meclizine and Zofran at this time due to further QT prolongation effects. Patient verbalizes understanding of plan of care.   Referral to neurology placed for further evaluation.     Illness progression and alarm symptoms discussed with patient, emphasizing low threshold for returning to clinic/emergency department for worsening symptoms. Patient is agreeable to the plan and verbalizes understanding, and will follow up if warranted.       Differential diagnosis, natural history, supportive care, and indications for immediate follow-up discussed.      Follow-up as needed if symptoms worsen or fail to improve to PCP,  Urgent care or Emergency Room.             Electronically signed by BLAS Crawley

## 2023-12-29 NOTE — ED TRIAGE NOTES
"Chief Complaint   Patient presents with    Palpitations     BIBA from home. Pt states she had palpitation this morning and feeling of she's going to pass out.   She went to Urgent care yesterday for dizziness and she was told she has borderline prolong QT interval     /61   Pulse 98   Temp 36.1 °C (97 °F) (Temporal)   Resp 20   Ht 1.626 m (5' 4\")   Wt 56.7 kg (125 lb)   SpO2 98%   BMI 21.46 kg/m²     "

## 2023-12-29 NOTE — ED PROVIDER NOTES
ED Provider Note    CHIEF COMPLAINT  Chief Complaint   Patient presents with    Palpitations     BIBA from home. Pt states she had palpitation this morning and feeling of she's going to pass out.   She went to Urgent care yesterday for dizziness and she was told she has borderline prolong QT interval       EXTERNAL RECORDS REVIEWED  Reviewed recent extensive laboratory studies performed last week    HPI/ROS  LIMITATION TO HISTORY   None  OUTSIDE HISTORIAN(S):  Patient's  provided additional history    Barb Valdez is a 28 y.o. female who presents for evaluation of palpitations episodes of dizziness.  The patient is a very pleasant otherwise healthy 28-year-old female.  She is trained as a nurse.  She has been having some depressive symptoms and recently started on Prozac in conjunction with her psychiatrist.  She had symptoms preceding the initiation of that medication last week however.  She reports ever since she started the Prozac she has been feeling palpitations more significantly.  She denies any chest pain no high fever or productive cough no leg swelling.  She has no known history of any cardiopulmonary process.  She actively denies strokelike symptoms such as numbness weakness tingling to the arms legs or face.  No severe headache.  She has not actually lost postural tone or passed out    PAST MEDICAL HISTORY   has a past medical history of Active medical problems: none, Anxiety, Depression, and IUD (intrauterine device) in place.    SURGICAL HISTORY  patient denies any surgical history    FAMILY HISTORY  Family History   Problem Relation Age of Onset    No Known Problems Mother     Heart Disease Father         50       SOCIAL HISTORY  Social History     Tobacco Use    Smoking status: Never    Smokeless tobacco: Never   Vaping Use    Vaping Use: Never used   Substance and Sexual Activity    Alcohol use: Yes     Alcohol/week: 0.0 oz     Comment: occ 1 every few months    Drug use: No    Sexual  "activity: Yes     Partners: Male     Birth control/protection: Condom       CURRENT MEDICATIONS  Home Medications       Reviewed by Precious Choudhury R.N. (Registered Nurse) on 12/29/23 at 1237  Med List Status: Partial     Medication Last Dose Status   Misc. Devices (BREAST PUMP) Misc  Active   Prenatal MV-Min-Fe Fum-FA-DHA (PRENATAL 1 PO)  Active   PROZAC 20 MG Cap  Active                    ALLERGIES  No Known Allergies    PHYSICAL EXAM  VITAL SIGNS: /61   Pulse 98   Temp 36.1 °C (97 °F) (Temporal)   Resp 20   Ht 1.626 m (5' 4\")   Wt 56.7 kg (125 lb)   SpO2 98%   BMI 21.46 kg/m²    Pulse ox interpretation: I interpret this pulse ox as normal.  Constitutional: Alert and oriented x 3, no acute distress  HEENT: Atraumatic normocephalic, pupils are equal round reactive to light extraocular movements are intact. The nares is clear, external ears are normal, mouth shows moist mucous membranes normal dentition for age  Neck: Supple, no JVD no tracheal deviation  Cardiovascular: Mild tachycardia no murmur rub or gallop 2+ pulses peripherally x4  Thorax & Lungs: No respiratory distress, no wheezes rales or rhonchi, No chest tenderness.   GI: Soft nontender nondistended positive bowel sounds, no peritoneal signs  Skin: Warm dry no acute rash or lesion  Musculoskeletal: Moving all extremities with full range and 5 of 5 strength no acute  deformity  Neurologic: Cranial nerves III through XII are grossly intact no sensory deficit no cerebellar dysfunction   Psychiatric: Appropriate affect for situation at this time          DIAGNOSTIC STUDIES / PROCEDURES    LABS  Results for orders placed or performed during the hospital encounter of 12/29/23   HCG QUAL SERUM   Result Value Ref Range    Beta-Hcg Qualitative Serum Negative Negative   CBC WITH DIFFERENTIAL   Result Value Ref Range    WBC 7.1 4.8 - 10.8 K/uL    RBC 4.93 4.20 - 5.40 M/uL    Hemoglobin 14.6 12.0 - 16.0 g/dL    Hematocrit 43.7 37.0 - 47.0 %    MCV 88.6 " 81.4 - 97.8 fL    MCH 29.6 27.0 - 33.0 pg    MCHC 33.4 32.2 - 35.5 g/dL    RDW 37.6 35.9 - 50.0 fL    Platelet Count 283 164 - 446 K/uL    MPV 9.5 9.0 - 12.9 fL    Neutrophils-Polys 70.90 44.00 - 72.00 %    Lymphocytes 20.50 (L) 22.00 - 41.00 %    Monocytes 6.60 0.00 - 13.40 %    Eosinophils 1.00 0.00 - 6.90 %    Basophils 0.70 0.00 - 1.80 %    Immature Granulocytes 0.30 0.00 - 0.90 %    Nucleated RBC 0.00 0.00 - 0.20 /100 WBC    Neutrophils (Absolute) 5.02 1.82 - 7.42 K/uL    Lymphs (Absolute) 1.45 1.00 - 4.80 K/uL    Monos (Absolute) 0.47 0.00 - 0.85 K/uL    Eos (Absolute) 0.07 0.00 - 0.51 K/uL    Baso (Absolute) 0.05 0.00 - 0.12 K/uL    Immature Granulocytes (abs) 0.02 0.00 - 0.11 K/uL    NRBC (Absolute) 0.00 K/uL   Comp Metabolic Panel   Result Value Ref Range    Sodium 141 135 - 145 mmol/L    Potassium 4.0 3.6 - 5.5 mmol/L    Chloride 107 96 - 112 mmol/L    Co2 23 20 - 33 mmol/L    Anion Gap 11.0 7.0 - 16.0    Glucose 146 (H) 65 - 99 mg/dL    Bun 12 8 - 22 mg/dL    Creatinine 0.59 0.50 - 1.40 mg/dL    Calcium 9.0 8.5 - 10.5 mg/dL    Correct Calcium 8.8 8.5 - 10.5 mg/dL    AST(SGOT) 10 (L) 12 - 45 U/L    ALT(SGPT) 10 2 - 50 U/L    Alkaline Phosphatase 98 30 - 99 U/L    Total Bilirubin 0.3 0.1 - 1.5 mg/dL    Albumin 4.3 3.2 - 4.9 g/dL    Total Protein 6.8 6.0 - 8.2 g/dL    Globulin 2.5 1.9 - 3.5 g/dL    A-G Ratio 1.7 g/dL   D-DIMER   Result Value Ref Range    D-Dimer <0.27 0.00 - 0.50 ug/mL (FEU)   HEMOGLOBIN A1C   Result Value Ref Range    Glycohemoglobin 5.0 4.0 - 5.6 %    Est Avg Glucose 97 mg/dL   TSH WITH REFLEX TO FT4   Result Value Ref Range    TSH 0.850 0.380 - 5.330 uIU/mL   ESTIMATED GFR   Result Value Ref Range    GFR (CKD-EPI) 126 >60 mL/min/1.73 m 2   EKG   Result Value Ref Range    Report       [facility] Emergency Dept[facility]    Test Date:  2023-12-29  Pt Name:    ALLYSON MOCK                    Department: ER  MRN:        [id number]                      Room:        20  Gender:      Female                       Technician: [id number]  :        [id number]                   Requested By:ER TRIAGE PROTOCOL  Order #:    [id number]                    Reading MD: LIEN TYSON MD    Measurements  Intervals                                Axis  Rate:       98                           P:          56  MO:         180                          QRS:        83  QRSD:       101                          T:          -20  QT:         379  QTc:        484    Interpretive Statements  Sinus rhythm  Probable left atrial enlargement  Low voltage, precordial leads  Borderline prolonged QT interval  No previous ECG available for comparison  Electronically Signed On 2023 14:18:24 PST by LIEN TYSON MD        EKG interpretation by me rate 98 sinus rhythm QT interval is slightly prolonged corrected at 484 no evidence of torsades.  No evidence of arrhythmia heart block or ischemia  RADIOLOGY  I have independently interpreted the diagnostic imaging associated with this visit and am waiting the final reading from the radiologist.   My preliminary interpretation is as follows: No evidence of acute heart failure  Radiologist interpretation:   DX-CHEST-PORTABLE (1 VIEW)   Final Result      No acute cardiopulmonary process.          COURSE & MEDICAL DECISION MAKING    ED Observation Status? Yes; I am placing the patient in to an observation status due to a diagnostic uncertainty as well as therapeutic intensity. Patient placed in observation status at 1237 PM, 2023.     Observation plan is as follows: Establish an IV, perform extensive blood test perform serial vital signs monitor on telemetry for over 2 hours administer IV fluids    Upon Reevaluation, the patient's condition has: Improved; and will be discharged.    Patient discharged from ED Observation status at 2:30 PM (Time)  (Date).     INITIAL ASSESSMENT, COURSE AND PLAN  Care Narrative:     This is a very pleasant 28-year-old otherwise healthy female  who presents here with subjective palpitations.  I reviewed her extensive laboratory studies including her CBC and metabolic panel and hemoglobin A1c and thyroid studies from earlier in the month.  Here she had mild tachycardia but no evidence of tacky dysrhythmia.  There is no high fever hypoxia or to suggest infectious process.  D-dimer testing was negative and there is no evidence of pregnancy on serological exam.  Thyroid studies were normal as well.  Patient was given IV fluids and heart rate did come down around 10 bpm from around 10 5-95.  Her QT interval is slightly prolonged but we have no old EKG to compare.  Is unclear whether or not this is her underlying rhythm or could be related to the recent initiation of Prozac.  I counseled the patient a lot of her symptoms could be related to Prozac however we cannot definitively prove that.  I counseled her to follow-up with her psychiatrist as they may want to consider either discontinuing or weaning her off Prozac.  We will also provide referral to [facility] cardiovascular as the patient may very well require echocardiogram tilt table test or other cardiopulmonary workup    HYDRATION: Based on the patient's presentation of Tachycardia the patient was given IV fluids. IV Hydration was used because oral hydration was not adequate alone. Upon recheck following hydration, the patient was improved.      ADDITIONAL PROBLEM LIST    DISPOSITION AND DISCUSSIONS  I have discussed management of the patient with the following physicians and ADÁN's:  none    Discussion of management with other QHP or appropriate source(s): None     Escalation of care considered, and ultimately not performed:acute inpatient care management, however at this time, the patient is most appropriate for outpatient management    Barriers to care at this time, including but not limited to: none.     Decision tools and prescription drugs considered including, but not limited to: Divide syncope  rules.    FINAL DIAGNOSIS  1. Palpitations  REFERRAL TO CARDIOLOGY               Electronically signed by: Devendra Chadwick M.D., 12/29/2023 12:57 PM

## 2024-02-06 SDOH — ECONOMIC STABILITY: HOUSING INSECURITY
IN THE LAST 12 MONTHS, WAS THERE A TIME WHEN YOU DID NOT HAVE A STEADY PLACE TO SLEEP OR SLEPT IN A SHELTER (INCLUDING NOW)?: NO

## 2024-02-06 SDOH — HEALTH STABILITY: PHYSICAL HEALTH: ON AVERAGE, HOW MANY MINUTES DO YOU ENGAGE IN EXERCISE AT THIS LEVEL?: 60 MIN

## 2024-02-06 SDOH — ECONOMIC STABILITY: HOUSING INSECURITY: IN THE LAST 12 MONTHS, HOW MANY PLACES HAVE YOU LIVED?: 1

## 2024-02-06 SDOH — HEALTH STABILITY: PHYSICAL HEALTH: ON AVERAGE, HOW MANY DAYS PER WEEK DO YOU ENGAGE IN MODERATE TO STRENUOUS EXERCISE (LIKE A BRISK WALK)?: 2 DAYS

## 2024-02-06 SDOH — HEALTH STABILITY: MENTAL HEALTH
STRESS IS WHEN SOMEONE FEELS TENSE, NERVOUS, ANXIOUS, OR CAN'T SLEEP AT NIGHT BECAUSE THEIR MIND IS TROUBLED. HOW STRESSED ARE YOU?: TO SOME EXTENT

## 2024-02-06 SDOH — ECONOMIC STABILITY: FOOD INSECURITY: WITHIN THE PAST 12 MONTHS, THE FOOD YOU BOUGHT JUST DIDN'T LAST AND YOU DIDN'T HAVE MONEY TO GET MORE.: NEVER TRUE

## 2024-02-06 SDOH — ECONOMIC STABILITY: INCOME INSECURITY: IN THE LAST 12 MONTHS, WAS THERE A TIME WHEN YOU WERE NOT ABLE TO PAY THE MORTGAGE OR RENT ON TIME?: NO

## 2024-02-06 SDOH — ECONOMIC STABILITY: TRANSPORTATION INSECURITY
IN THE PAST 12 MONTHS, HAS LACK OF RELIABLE TRANSPORTATION KEPT YOU FROM MEDICAL APPOINTMENTS, MEETINGS, WORK OR FROM GETTING THINGS NEEDED FOR DAILY LIVING?: NO

## 2024-02-06 SDOH — ECONOMIC STABILITY: TRANSPORTATION INSECURITY
IN THE PAST 12 MONTHS, HAS THE LACK OF TRANSPORTATION KEPT YOU FROM MEDICAL APPOINTMENTS OR FROM GETTING MEDICATIONS?: NO

## 2024-02-06 SDOH — ECONOMIC STABILITY: FOOD INSECURITY: WITHIN THE PAST 12 MONTHS, YOU WORRIED THAT YOUR FOOD WOULD RUN OUT BEFORE YOU GOT MONEY TO BUY MORE.: NEVER TRUE

## 2024-02-06 SDOH — ECONOMIC STABILITY: INCOME INSECURITY: HOW HARD IS IT FOR YOU TO PAY FOR THE VERY BASICS LIKE FOOD, HOUSING, MEDICAL CARE, AND HEATING?: NOT HARD AT ALL

## 2024-02-06 SDOH — ECONOMIC STABILITY: TRANSPORTATION INSECURITY
IN THE PAST 12 MONTHS, HAS LACK OF TRANSPORTATION KEPT YOU FROM MEETINGS, WORK, OR FROM GETTING THINGS NEEDED FOR DAILY LIVING?: NO

## 2024-02-06 ASSESSMENT — SOCIAL DETERMINANTS OF HEALTH (SDOH)
HOW OFTEN DO YOU HAVE SIX OR MORE DRINKS ON ONE OCCASION: NEVER
HOW HARD IS IT FOR YOU TO PAY FOR THE VERY BASICS LIKE FOOD, HOUSING, MEDICAL CARE, AND HEATING?: NOT HARD AT ALL
IN A TYPICAL WEEK, HOW MANY TIMES DO YOU TALK ON THE PHONE WITH FAMILY, FRIENDS, OR NEIGHBORS?: MORE THAN THREE TIMES A WEEK
DO YOU BELONG TO ANY CLUBS OR ORGANIZATIONS SUCH AS CHURCH GROUPS UNIONS, FRATERNAL OR ATHLETIC GROUPS, OR SCHOOL GROUPS?: NO
HOW OFTEN DO YOU ATTEND CHURCH OR RELIGIOUS SERVICES?: NEVER
IN A TYPICAL WEEK, HOW MANY TIMES DO YOU TALK ON THE PHONE WITH FAMILY, FRIENDS, OR NEIGHBORS?: MORE THAN THREE TIMES A WEEK
HOW OFTEN DO YOU ATTENT MEETINGS OF THE CLUB OR ORGANIZATION YOU BELONG TO?: NEVER
HOW OFTEN DO YOU HAVE A DRINK CONTAINING ALCOHOL: MONTHLY OR LESS
HOW OFTEN DO YOU ATTEND CHURCH OR RELIGIOUS SERVICES?: NEVER
HOW MANY DRINKS CONTAINING ALCOHOL DO YOU HAVE ON A TYPICAL DAY WHEN YOU ARE DRINKING: 1 OR 2
WITHIN THE PAST 12 MONTHS, YOU WORRIED THAT YOUR FOOD WOULD RUN OUT BEFORE YOU GOT THE MONEY TO BUY MORE: NEVER TRUE
HOW OFTEN DO YOU ATTENT MEETINGS OF THE CLUB OR ORGANIZATION YOU BELONG TO?: NEVER
HOW OFTEN DO YOU GET TOGETHER WITH FRIENDS OR RELATIVES?: ONCE A WEEK
HOW OFTEN DO YOU GET TOGETHER WITH FRIENDS OR RELATIVES?: ONCE A WEEK
DO YOU BELONG TO ANY CLUBS OR ORGANIZATIONS SUCH AS CHURCH GROUPS UNIONS, FRATERNAL OR ATHLETIC GROUPS, OR SCHOOL GROUPS?: NO

## 2024-02-06 ASSESSMENT — LIFESTYLE VARIABLES
HOW OFTEN DO YOU HAVE SIX OR MORE DRINKS ON ONE OCCASION: NEVER
HOW MANY STANDARD DRINKS CONTAINING ALCOHOL DO YOU HAVE ON A TYPICAL DAY: 1 OR 2
AUDIT-C TOTAL SCORE: 1
HOW OFTEN DO YOU HAVE A DRINK CONTAINING ALCOHOL: MONTHLY OR LESS
SKIP TO QUESTIONS 9-10: 1

## 2024-02-09 ENCOUNTER — OFFICE VISIT (OUTPATIENT)
Dept: MEDICAL GROUP | Facility: MEDICAL CENTER | Age: 29
End: 2024-02-09
Payer: COMMERCIAL

## 2024-02-09 VITALS
OXYGEN SATURATION: 94 % | WEIGHT: 112 LBS | SYSTOLIC BLOOD PRESSURE: 98 MMHG | HEART RATE: 78 BPM | HEIGHT: 64 IN | TEMPERATURE: 97.1 F | DIASTOLIC BLOOD PRESSURE: 56 MMHG | BODY MASS INDEX: 19.12 KG/M2

## 2024-02-09 DIAGNOSIS — R00.2 PALPITATIONS: ICD-10-CM

## 2024-02-09 DIAGNOSIS — R94.31 PROLONGED Q-T INTERVAL ON ECG: ICD-10-CM

## 2024-02-09 DIAGNOSIS — F41.1 GAD (GENERALIZED ANXIETY DISORDER): ICD-10-CM

## 2024-02-09 DIAGNOSIS — F33.0 MILD EPISODE OF RECURRENT MAJOR DEPRESSIVE DISORDER (HCC): ICD-10-CM

## 2024-02-09 PROCEDURE — 99214 OFFICE O/P EST MOD 30 MIN: CPT

## 2024-02-09 PROCEDURE — 3078F DIAST BP <80 MM HG: CPT

## 2024-02-09 PROCEDURE — 3074F SYST BP LT 130 MM HG: CPT

## 2024-02-09 RX ORDER — LORAZEPAM 0.5 MG/1
TABLET ORAL
COMMUNITY
Start: 2023-12-29

## 2024-02-09 RX ORDER — ESCITALOPRAM OXALATE 10 MG/1
10 TABLET, FILM COATED ORAL DAILY
COMMUNITY
Start: 2024-01-01

## 2024-02-09 ASSESSMENT — ENCOUNTER SYMPTOMS
ORTHOPNEA: 0
PALPITATIONS: 0
SHORTNESS OF BREATH: 0
FEVER: 0
CHILLS: 0
COUGH: 0

## 2024-02-09 ASSESSMENT — PATIENT HEALTH QUESTIONNAIRE - PHQ9
6. FEELING BAD ABOUT YOURSELF - OR THAT YOU ARE A FAILURE OR HAVE LET YOURSELF OR YOUR FAMILY DOWN: NOT AL ALL
SUM OF ALL RESPONSES TO PHQ9 QUESTIONS 1 AND 2: 0
CLINICAL INTERPRETATION OF PHQ2 SCORE: 0
5. POOR APPETITE OR OVEREATING: NOT AT ALL
1. LITTLE INTEREST OR PLEASURE IN DOING THINGS: NOT AT ALL
SUM OF ALL RESPONSES TO PHQ QUESTIONS 1-9: 1
2. FEELING DOWN, DEPRESSED, IRRITABLE, OR HOPELESS: NOT AT ALL
3. TROUBLE FALLING OR STAYING ASLEEP OR SLEEPING TOO MUCH: NOT AT ALL
4. FEELING TIRED OR HAVING LITTLE ENERGY: SEVERAL DAYS
8. MOVING OR SPEAKING SO SLOWLY THAT OTHER PEOPLE COULD HAVE NOTICED. OR THE OPPOSITE, BEING SO FIGETY OR RESTLESS THAT YOU HAVE BEEN MOVING AROUND A LOT MORE THAN USUAL: NOT AT ALL
7. TROUBLE CONCENTRATING ON THINGS, SUCH AS READING THE NEWSPAPER OR WATCHING TELEVISION: NOT AT ALL
9. THOUGHTS THAT YOU WOULD BE BETTER OFF DEAD, OR OF HURTING YOURSELF: NOT AT ALL

## 2024-02-09 ASSESSMENT — FIBROSIS 4 INDEX: FIB4 SCORE: 0.31

## 2024-02-09 NOTE — PROGRESS NOTES
"Subjective:     CC: Establish Care      HPI:   Barb is a 28 y.o. female who presents today for:    Yvon/ depression: Lexapro is working well. She is going to therapy. Has not needed ativan PHQ-9 score of 1. Symptoms started after the birth of her daughter. She is feeling a lot better seeing a therapist.     Prolonged QT/ palpitations: she was seen at  on 12/28 for dizziness and feeling like she was going to pass out after starting Prozac, she was found to have a prolonged QT interverbal. She was referred to the ER, she had a normal work up except the EKG changes. She was referred to cardiology, she is seeing cardiology next week. She stopped Prozac after these symptoms occurred and switched to Lexapro. No recurrently syncope/ palpitations since she stopped the Prozac.     Allergies: Patient has no known allergies.     Medications:   Current Outpatient Medications:     LORazepam (ATIVAN) 0.5 MG Tab, TAKE 0.5-1 ORAL TABLET AS NEEDED (6-8) FOR SEVERE PANIC, Disp: , Rfl:     LEXAPRO 10 MG Tab, , Disp: , Rfl:     Misc. Devices (BREAST PUMP) Misc, For breastfeeding difficulty or milk storage, Disp: 1 Each, Rfl: 0    Prenatal MV-Min-Fe Fum-FA-DHA (PRENATAL 1 PO), Take  by mouth., Disp: , Rfl:       ROS:  Review of Systems   Constitutional:  Negative for chills and fever.   Respiratory:  Negative for cough and shortness of breath.    Cardiovascular:  Negative for chest pain, palpitations, orthopnea and leg swelling.       Objective:     Exam:  BP 98/56   Pulse 78   Temp 36.2 °C (97.1 °F) (Temporal)   Ht 1.626 m (5' 4\")   Wt 50.8 kg (112 lb)   SpO2 94%   BMI 19.22 kg/m²  Body mass index is 19.22 kg/m².    Physical Exam  Constitutional:       Appearance: Normal appearance.   Eyes:      Pupils: Pupils are equal, round, and reactive to light.   Cardiovascular:      Rate and Rhythm: Normal rate and regular rhythm.      Pulses: Normal pulses.      Heart sounds: Normal heart sounds.   Pulmonary:      Effort: Pulmonary " effort is normal.      Breath sounds: Normal breath sounds.   Abdominal:      General: Bowel sounds are normal.      Palpations: Abdomen is soft.   Neurological:      Mental Status: She is alert and oriented to person, place, and time.   Psychiatric:         Mood and Affect: Mood normal.         Behavior: Behavior normal.           Assessment & Plan:     Barb cates 28 y.o. female with the following -     1. PHYLLIS (generalized anxiety disorder)  2. Mild episode of recurrent major depressive disorder (HCC)  Chronic, stable  Doing well, follow up with therapist  - Continue as need LORazepam (ATIVAN) 0.5 MG Tab; TAKE 0.5-1 ORAL TABLET AS NEEDED (6-8) FOR SEVERE PANIC  - Continue LEXAPRO 10 MG Tab    3. Prolonged Q-T interval on ECG  4. Palpitations  Acute, uncomplicated  Likely due to Prozac, since symptoms have resolve since discontinuing medication. Follow up with cardiology for further evaluation.         Anticipatory guidance included the following: Patient counseled about skin care, diet, supplements, smoking, drugs/alcohol use, safe sex and exercise.     Return if symptoms worsen or fail to improve.    Please note that this dictation was created using voice recognition software. I have made every reasonable attempt to correct obvious errors, but I expect that there are errors of grammar and possibly content that I did not discover before finalizing the note.

## 2024-02-12 ENCOUNTER — OFFICE VISIT (OUTPATIENT)
Dept: CARDIOLOGY | Facility: MEDICAL CENTER | Age: 29
End: 2024-02-12
Attending: EMERGENCY MEDICINE
Payer: COMMERCIAL

## 2024-02-12 VITALS
OXYGEN SATURATION: 97 % | HEIGHT: 64 IN | DIASTOLIC BLOOD PRESSURE: 62 MMHG | SYSTOLIC BLOOD PRESSURE: 98 MMHG | RESPIRATION RATE: 16 BRPM | WEIGHT: 112 LBS | BODY MASS INDEX: 19.12 KG/M2 | HEART RATE: 88 BPM

## 2024-02-12 DIAGNOSIS — R00.2 PALPITATIONS: ICD-10-CM

## 2024-02-12 PROCEDURE — 3078F DIAST BP <80 MM HG: CPT | Performed by: INTERNAL MEDICINE

## 2024-02-12 PROCEDURE — 99203 OFFICE O/P NEW LOW 30 MIN: CPT | Performed by: INTERNAL MEDICINE

## 2024-02-12 PROCEDURE — 99211 OFF/OP EST MAY X REQ PHY/QHP: CPT | Performed by: INTERNAL MEDICINE

## 2024-02-12 PROCEDURE — 3074F SYST BP LT 130 MM HG: CPT | Performed by: INTERNAL MEDICINE

## 2024-02-12 PROCEDURE — 93005 ELECTROCARDIOGRAM TRACING: CPT | Performed by: INTERNAL MEDICINE

## 2024-02-12 ASSESSMENT — FIBROSIS 4 INDEX: FIB4 SCORE: 0.31

## 2024-02-12 NOTE — PATIENT INSTRUCTIONS
Follow up as needed  Will check a lipid panel.  Continue current medications  Call with questions.

## 2024-02-12 NOTE — ASSESSMENT & PLAN NOTE
Clinically, she is doing excellent not having further episodes of palpitations is likely was a side effect of being started on Prozac therapy.  She is currently still taking Lexapro therapy and not having any further symptoms and her overall EKG has largely returned to normal in terms of her QT interval.  No changes are recommended to her medical therapy.  I would not recommend any further cardiovascular workup.  Of note there is a strong family history of coronary artery disease and I have recommended that she undergo repeat lipid panel

## 2024-02-12 NOTE — PROGRESS NOTES
[facility]    PatientName:Barb Sullivan FurrDate: 2024  :1995    28 y.o.PCP:SASHA Delgado  MRN:[id number]          Problems and Plans    Palpitations  Clinically, she is doing excellent not having further episodes of palpitations is likely was a side effect of being started on Prozac therapy.  She is currently still taking Lexapro therapy and not having any further symptoms and her overall EKG has largely returned to normal in terms of her QT interval.  No changes are recommended to her medical therapy.  I would not recommend any further cardiovascular workup.  Of note there is a strong family history of coronary artery disease and I have recommended that she undergo repeat lipid panel    Return if symptoms worsen or fail to improve.      Encounter    Reason for Visit / Chief Complaint: Palpitations    HPI    28-year-old female with known history of anxiety/depression presents in consultation for evaluation of palpitations.    She been her usual state of health until 2023 when she ultimately was brought in by ambulance to the emergency department for evaluation for palpitations.  She had recently started Prozac therapy as part of her evaluation for ongoing treatment of her anxiety and depression.  Ultimately she had a negative workup in the emergency department subsequently discharged.  She was transitioned over to Lexapro by her primary care provider and ultimately per recorded documentation seen improvement in overall symptoms.    Currently, she notes she is feeling well and notes that her symptoms have improved since stopping Prozac and being transitioned over to Lexapro.  Her other clinical concern is that when she is utilizing her breast pump she occasionally has some paresthesias but are not present when she is breast-feeding her infant.    Prior cardiovascular workup consists of an EKG performed on sinus rhythm with borderline prolonged QT  interval.  Past Medical History  Past Medical History:   Diagnosis Date    Active medical problems: none     Anxiety     Depression     IUD (intrauterine device) in place      Past Surgical History  History reviewed. No pertinent surgical history.  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   Other Topics Concern    Not on file   Social History Narrative    Not on file     Social Determinants of Health     Financial Resource Strain: Low Risk  (2/6/2024)    Overall Financial Resource Strain (CARDIA)     Difficulty of Paying Living Expenses: Not hard at all   Food Insecurity: No Food Insecurity (2/6/2024)    Hunger Vital Sign     Worried About Running Out of Food in the Last Year: Never true     Ran Out of Food in the Last Year: Never true   Transportation Needs: No Transportation Needs (2/6/2024)    PRAPARE - Transportation     Lack of Transportation (Medical): No     Lack of Transportation (Non-Medical): No   Physical Activity: Insufficiently Active (2/6/2024)    Exercise Vital Sign     Days of Exercise per Week: 2 days     Minutes of Exercise per Session: 60 min   Stress: Stress Concern Present (2/6/2024)    [place] [employer] - Occupational Stress Questionnaire     Feeling of Stress : To some extent   Social Connections: Moderately Isolated (2/6/2024)    Social Connection and Isolation Panel [NHANES]     Frequency of Communication with Friends and  "Family: More than three times a week     Frequency of Social Gatherings with Friends and Family: Once a week     Attends Jew Services: Never     Active Member of Clubs or Organizations: No     Attends Club or Organization Meetings: Never     Marital Status:    Intimate Partner Violence: Not on file   Housing Stability: Low Risk  (2/6/2024)    Housing Stability Vital Sign     Unable to Pay for Housing in the Last Year: No     Number of Places Lived in the Last Year: 1     Unstable Housing in the Last Year: No     Past Family History  Family History   Problem Relation Age of Onset    Hyperlipidemia Mother     Heart Disease Father         50    No Known Problems Daughter      Medication(s)    Current Outpatient Medications:     LORazepam, TAKE 0.5-1 ORAL TABLET AS NEEDED (6-8) FOR SEVERE PANIC, PRN    Lexapro, 10 mg, Oral, DAILY, Taking    Prenatal MV-Min-Fe Fum-FA-DHA (PRENATAL 1 PO), Take  by mouth., Taking  Allergies  Patient has no known allergies.    Review of Systems    A comprehensive 10 system review was conducted and is negative except as noted above in the HPI or here.      Vital Signs  BP 98/62 (BP Location: Left arm, Patient Position: Sitting, BP Cuff Size: Adult)   Pulse 88   Resp 16   Ht 1.626 m (5' 4\")   Wt 50.8 kg (112 lb)   SpO2 97%   BMI 19.22 kg/m²     Physical Exam  Vitals and nursing note reviewed.   Constitutional:       Appearance: Normal appearance.   HENT:      Head: Normocephalic and atraumatic.      Nose: Nose normal.      Mouth/Throat:      Mouth: Mucous membranes are moist.      Pharynx: Oropharynx is clear.   Eyes:      Pupils: Pupils are equal, round, and reactive to light.   Cardiovascular:      Rate and Rhythm: Normal rate and regular rhythm.      Heart sounds: No murmur heard.     No friction rub. No gallop.   Pulmonary:      Effort: Pulmonary effort is normal.      Breath sounds: Normal breath sounds.   Abdominal:      General: Bowel sounds are normal.      " Palpations: Abdomen is soft.   Musculoskeletal:         General: Normal range of motion.      Cervical back: Normal range of motion and neck supple.   Skin:     General: Skin is warm and dry.   Neurological:      General: No focal deficit present.      Mental Status: She is alert and oriented to person, place, and time. Mental status is at baseline.   Psychiatric:         Mood and Affect: Mood normal.         Behavior: Behavior normal.         Thought Content: Thought content normal.         Judgment: Judgment normal.         Lab Results   Component Value Date/Time    TSHULTRASEN 0.850 12/29/2023 1255        Lab Results   Component Value Date/Time    FREET4 1.57 12/21/2023 0713        Lab Results   Component Value Date/Time    HBA1C 5.0 12/29/2023 12:55 PM       Lab Results   Component Value Date/Time    CHOLSTRLTOT 191 03/12/2021 10:13 AM     (H) 03/12/2021 10:13 AM    HDL 44 03/12/2021 10:13 AM    TRIGLYCERIDE 61 03/12/2021 10:13 AM         Lab Results   Component Value Date/Time    SODIUM 141 12/29/2023 12:55 PM    POTASSIUM 4.0 12/29/2023 12:55 PM    CHLORIDE 107 12/29/2023 12:55 PM    CO2 23 12/29/2023 12:55 PM    GLUCOSE 146 (H) 12/29/2023 12:55 PM    BUN 12 12/29/2023 12:55 PM    CREATININE 0.59 12/29/2023 12:55 PM       Lab Results   Component Value Date/Time    ALKPHOSPHAT 98 12/29/2023 12:55 PM    ASTSGOT 10 (L) 12/29/2023 12:55 PM    ALTSGPT 10 12/29/2023 12:55 PM    TBILIRUBIN 0.3 12/29/2023 12:55 PM         Imaging  EKG demonstrates normal sinus rhythm with normal intervals.  I reviewed interpret his EKG.  Findings are discussed with the patient    Total patient time was estimated to be 30 minutes consisting of chart review, direct patient interaction, medication renewal, plan development and overall communication with the cardiovascular team.        Electronically signed by:   Andrez Gallegos DO, MPH  [facility]    Portions of this note were completed using  voice recognition software (Dragon Naturally speaking software) . Occasional transcription errors may have escaped proof reading. I have made every reasonable attempt to correct obvious errors, but I expect that there are errors of grammar and possibly content that I did not discover before finalizing the note.

## 2024-02-14 LAB — EKG IMPRESSION: NORMAL

## 2024-02-14 PROCEDURE — 93010 ELECTROCARDIOGRAM REPORT: CPT | Performed by: INTERNAL MEDICINE

## 2024-03-15 ENCOUNTER — OFFICE VISIT (OUTPATIENT)
Dept: URGENT CARE | Facility: CLINIC | Age: 29
End: 2024-03-15
Payer: COMMERCIAL

## 2024-03-15 VITALS
HEIGHT: 64 IN | TEMPERATURE: 97.7 F | SYSTOLIC BLOOD PRESSURE: 110 MMHG | WEIGHT: 107 LBS | RESPIRATION RATE: 16 BRPM | OXYGEN SATURATION: 97 % | BODY MASS INDEX: 18.27 KG/M2 | DIASTOLIC BLOOD PRESSURE: 58 MMHG | HEART RATE: 75 BPM

## 2024-03-15 DIAGNOSIS — R05.1 ACUTE COUGH: ICD-10-CM

## 2024-03-15 PROCEDURE — 3078F DIAST BP <80 MM HG: CPT | Performed by: REGISTERED NURSE

## 2024-03-15 PROCEDURE — 3074F SYST BP LT 130 MM HG: CPT | Performed by: REGISTERED NURSE

## 2024-03-15 PROCEDURE — 99213 OFFICE O/P EST LOW 20 MIN: CPT | Performed by: REGISTERED NURSE

## 2024-03-15 RX ORDER — BENZONATATE 100 MG/1
100 CAPSULE ORAL 3 TIMES DAILY PRN
Qty: 30 CAPSULE | Refills: 0 | Status: SHIPPED | OUTPATIENT
Start: 2024-03-15 | End: 2024-03-25

## 2024-03-15 RX ORDER — AMOXICILLIN AND CLAVULANATE POTASSIUM 875; 125 MG/1; MG/1
1 TABLET, FILM COATED ORAL 2 TIMES DAILY
Qty: 14 TABLET | Refills: 0 | Status: SHIPPED | OUTPATIENT
Start: 2024-03-15 | End: 2024-03-22

## 2024-03-15 ASSESSMENT — ENCOUNTER SYMPTOMS
CHILLS: 0
COUGH: 1
SHORTNESS OF BREATH: 0
SORE THROAT: 0
ABDOMINAL PAIN: 0
SPUTUM PRODUCTION: 1
DIZZINESS: 0
FEVER: 0

## 2024-03-15 ASSESSMENT — FIBROSIS 4 INDEX: FIB4 SCORE: 0.31

## 2024-03-15 NOTE — PROGRESS NOTES
"Subjective:   Barb Valdez is a 28 y.o. female who presents for Cough (X 8 days, productive cough.)    HPI  8 days of worsening cough that is productive with thick mucous. Also congestion, runny nose, and copious PND. No lung hx. No hx of sinus infections. Using OTC medications with minimal relief. No recent antibiotic. No recent hospitalizations. Denies pregnancy but is breast-feeding. Immunizations current.     Review of Systems   Constitutional:  Negative for chills and fever.   HENT:  Positive for congestion. Negative for sore throat.    Respiratory:  Positive for cough and sputum production. Negative for shortness of breath.    Cardiovascular:  Negative for chest pain.   Gastrointestinal:  Negative for abdominal pain.   Skin:  Negative for rash.   Neurological:  Negative for dizziness.       Medications, Allergies, and current problem list reviewed today in Epic.     Objective:     /58   Pulse 75   Temp 36.5 °C (97.7 °F) (Temporal)   Resp 16   Ht 1.626 m (5' 4\")   Wt 48.5 kg (107 lb)   SpO2 97%     Physical Exam  Vitals and nursing note reviewed.   Constitutional:       Appearance: Normal appearance. She is not ill-appearing or toxic-appearing.   HENT:      Right Ear: Tympanic membrane normal.      Left Ear: Tympanic membrane normal.      Nose: Congestion present.      Mouth/Throat:      Mouth: Mucous membranes are moist.   Eyes:      Pupils: Pupils are equal, round, and reactive to light.   Cardiovascular:      Rate and Rhythm: Normal rate and regular rhythm.   Pulmonary:      Effort: Pulmonary effort is normal. No respiratory distress.      Breath sounds: Normal breath sounds. No wheezing or rales.      Comments: Loose congested cough  Musculoskeletal:         General: Normal range of motion.      Cervical back: Normal range of motion.   Skin:     General: Skin is warm and dry.      Capillary Refill: Capillary refill takes less than 2 seconds.   Neurological:      General: No focal deficit " present.      Mental Status: She is alert and oriented to person, place, and time.      Cranial Nerves: No cranial nerve deficit.   Psychiatric:         Mood and Affect: Mood normal.         Assessment/Plan:     I personally reviewed prior external notes and test results pertinent to today's visit as well as additional imaging and testing completed in clinic today.     1. Acute cough  benzonatate (TESSALON) 100 MG Cap    amoxicillin-clavulanate (AUGMENTIN) 875-125 MG Tab        Persistent and worsening cough that is productive with thick mucus x 8 days now.  Symptoms not responding to OTC medications.  No chronic heart or lung disease.  Thankfully vital signs are reassuring.  She does appear well and nontoxic no increased work of breathing, does have congestion and rhinorrhea, no adventitious lung sounds but does cough consistently throughout exam.  Overall unremarkable exam.  Given length of time for cough as well as mucus production will start on Augmentin for bacterial lung infection since she is currently breast-feeding, will also provide benzonatate, discussed pulmonary toilet, lactation safe OTC medications.  Increase fluids.    Shared decision-making was utilized with patient for treatment plan. Differential Diagnosis, natural history, and supportive care discussed.     Medication discussed included indication for use and the potential benefits and side effects. Education was provided regarding the aforementioned assessments. All of the patient's questions were answered to their satisfaction at the time of discharge. Patient was encouraged to monitor symptoms closely. Those signs and symptoms which would warrant concern and mandate seeking a higher level of service through the emergency department discussed at length. Patient stated agreement and understanding of this plan of care.     Please note that this dictation was created using voice recognition software. I have made every reasonable attempt to correct  obvious errors, but I expect that there are errors of grammar and possibly content that I did not discover before finalizing the note.    This note was electronically signed by SASHA Virk

## 2024-04-18 ENCOUNTER — APPOINTMENT (OUTPATIENT)
Dept: NEUROLOGY | Facility: MEDICAL CENTER | Age: 29
End: 2024-04-18
Attending: PSYCHIATRY & NEUROLOGY
Payer: COMMERCIAL

## 2024-04-23 ENCOUNTER — OFFICE VISIT (OUTPATIENT)
Dept: URGENT CARE | Facility: CLINIC | Age: 29
End: 2024-04-23
Payer: COMMERCIAL

## 2024-04-23 VITALS
HEART RATE: 75 BPM | OXYGEN SATURATION: 96 % | DIASTOLIC BLOOD PRESSURE: 60 MMHG | TEMPERATURE: 97.6 F | RESPIRATION RATE: 18 BRPM | SYSTOLIC BLOOD PRESSURE: 102 MMHG

## 2024-04-23 DIAGNOSIS — J02.9 SORE THROAT: ICD-10-CM

## 2024-04-23 LAB — S PYO DNA SPEC NAA+PROBE: NOT DETECTED

## 2024-04-23 PROCEDURE — 99213 OFFICE O/P EST LOW 20 MIN: CPT

## 2024-04-23 PROCEDURE — 3074F SYST BP LT 130 MM HG: CPT

## 2024-04-23 PROCEDURE — 3078F DIAST BP <80 MM HG: CPT

## 2024-04-23 PROCEDURE — 87651 STREP A DNA AMP PROBE: CPT

## 2024-04-23 PROCEDURE — 1125F AMNT PAIN NOTED PAIN PRSNT: CPT

## 2024-04-23 ASSESSMENT — ENCOUNTER SYMPTOMS
SORE THROAT: 1
FEVER: 0
NAUSEA: 0
VOMITING: 0
COUGH: 0
ABDOMINAL PAIN: 0
SHORTNESS OF BREATH: 0

## 2024-04-23 ASSESSMENT — PAIN SCALES - GENERAL: PAINLEVEL: 2=MINIMAL-SLIGHT

## 2024-04-23 NOTE — LETTER
[facility]  [address] [address]     April 23, 2024    Patient: Barb Valdez   YOB: 1995   Date of Visit: 4/23/2024       To Whom It May Concern:    Barb Valdez was seen and treated in our department on 4/23/2024.     Sincerely,     ROBB Moses.

## 2024-04-23 NOTE — PROGRESS NOTES
Chief Complaint   Patient presents with    Pharyngitis     X3 days, fever on .        HISTORY OF PRESENT ILLNESS: Patient is a pleasant 28 y.o. female who presents to urgent care today with ongoing sore throat with a mild temperature on  for the last 3 days.  Patient denies any history related otherwise.    Patient Active Problem List    Diagnosis Date Noted    PHYLLIS (generalized anxiety disorder) 2024    Mild episode of recurrent major depressive disorder (HCC) 2024    Prolonged Q-T interval on ECG 2024    Palpitations 2024       Allergies:Patient has no known allergies.    Current Outpatient Medications Ordered in Epic   Medication Sig Dispense Refill    LORazepam (ATIVAN) 0.5 MG Tab TAKE 0.5-1 ORAL TABLET AS NEEDED (6-8) FOR SEVERE PANIC      LEXAPRO 10 MG Tab Take 10 mg by mouth every day.      Prenatal MV-Min-Fe Fum-FA-DHA (PRENATAL 1 PO) Take  by mouth.       No current Epic-ordered facility-administered medications on file.       Past Medical History:   Diagnosis Date    Active medical problems: none     Anxiety     Depression     IUD (intrauterine device) in place        Social History     Tobacco Use    Smoking status: Never    Smokeless tobacco: Never   Vaping Use    Vaping Use: Never used   Substance Use Topics    Alcohol use: Yes     Comment: Rarely    Drug use: No       Family Status   Relation Name Status    Alphonso Hilton Alive    Nima Hilton     Jessica  Alive     Family History   Problem Relation Age of Onset    Hyperlipidemia Mother     Heart Disease Father         50    No Known Problems Daughter        Review of Systems   Constitutional:  Negative for fever.   HENT:  Positive for sore throat. Negative for congestion and ear pain.    Respiratory:  Negative for cough and shortness of breath.    Gastrointestinal:  Negative for abdominal pain, nausea and vomiting.   Skin:  Negative for rash.       Exam:  /60 (BP Location: Right arm, Patient  Position: Sitting, BP Cuff Size: Adult)   Pulse 75   Temp 36.4 °C (97.6 °F) (Temporal)   Resp 18   SpO2 96%   Physical Exam  Constitutional:       Appearance: Normal appearance. She is normal weight. She is not toxic-appearing.   HENT:      Head: Normocephalic.      Right Ear: Tympanic membrane, ear canal and external ear normal. There is no impacted cerumen.      Left Ear: Tympanic membrane, ear canal and external ear normal. There is no impacted cerumen.      Nose: No nasal tenderness, mucosal edema, congestion or rhinorrhea.      Mouth/Throat:      Mouth: Mucous membranes are moist.      Pharynx: Posterior oropharyngeal erythema present. No oropharyngeal exudate.      Tonsils: No tonsillar exudate. 1+ on the right. 1+ on the left.   Eyes:      General:         Right eye: No discharge.         Left eye: No discharge.      Extraocular Movements: Extraocular movements intact.      Conjunctiva/sclera: Conjunctivae normal.      Pupils: Pupils are equal, round, and reactive to light.   Cardiovascular:      Rate and Rhythm: Normal rate and regular rhythm.      Pulses: Normal pulses.      Heart sounds: Normal heart sounds. No murmur heard.  Pulmonary:      Effort: Pulmonary effort is normal. No respiratory distress.      Breath sounds: Normal breath sounds. No stridor. No wheezing, rhonchi or rales.   Musculoskeletal:         General: No swelling, tenderness, deformity or signs of injury.      Cervical back: Normal range of motion and neck supple. No tenderness.      Right lower leg: No edema.      Left lower leg: No edema.   Skin:     General: Skin is warm and dry.      Capillary Refill: Capillary refill takes less than 2 seconds.      Findings: No bruising, erythema, lesion or rash.   Neurological:      General: No focal deficit present.      Mental Status: She is alert.      Sensory: No sensory deficit.      Motor: No weakness.      Coordination: Coordination normal.      Gait: Gait normal.   Psychiatric:          Mood and Affect: Mood normal.         Behavior: Behavior normal.         Thought Content: Thought content normal.         Judgment: Judgment normal.           Assessment/Plan:  1. Sore throat  - POCT GROUP A STREP, PCR    Patient comes in with complaints of a sore throat for the last 3 days.  Taking OTC medications with little to no relief.  On physical exam it is noted patient has mild erythremia, no major exudate or tonsillar enlargement.  POCT strep complete to rule out potential causes.  Advised the use of Motrin and Tylenol for any ongoing discomfort, vitamin C, D and zinc.  Patient is aware of the plan of care and agreeable at this time, advised they follow-up if they continue to get worse or do not improve.    Negative for strep at this time, likely a viral illness.  Patient notified via [employer].    Supportive care, differential diagnoses, and indications for immediate follow-up discussed with patient.   Pathogenesis of diagnosis discussed including typical length and natural progression.   Instructed to return to clinic or nearest emergency department for any change in condition, further concerns, or worsening of symptoms.  Patient states understanding of the plan of care and discharge instructions.  Instructed to make an appointment, for follow up, with  primary care provider.    Please note that this dictation was created using voice recognition software. I have made every reasonable attempt to correct obvious errors, but I expect that there are errors of grammar and possibly content that I did not discover before finalizing the note.      Elis ODONNELL

## 2024-04-25 DIAGNOSIS — H10.31 ACUTE BACTERIAL CONJUNCTIVITIS OF RIGHT EYE: ICD-10-CM

## 2024-04-25 RX ORDER — POLYMYXIN B SULFATE AND TRIMETHOPRIM 1; 10000 MG/ML; [USP'U]/ML
1 SOLUTION OPHTHALMIC 4 TIMES DAILY
Qty: 10 ML | Refills: 0 | Status: SHIPPED | OUTPATIENT
Start: 2024-04-25 | End: 2024-05-02

## 2024-05-07 ENCOUNTER — TELEPHONE (OUTPATIENT)
Dept: NEUROLOGY | Facility: MEDICAL CENTER | Age: 29
End: 2024-05-07
Payer: COMMERCIAL

## 2024-05-10 ENCOUNTER — OFFICE VISIT (OUTPATIENT)
Dept: NEUROLOGY | Facility: MEDICAL CENTER | Age: 29
End: 2024-05-10
Attending: INTERNAL MEDICINE
Payer: COMMERCIAL

## 2024-05-10 VITALS
WEIGHT: 112.88 LBS | DIASTOLIC BLOOD PRESSURE: 64 MMHG | OXYGEN SATURATION: 99 % | TEMPERATURE: 97.2 F | HEART RATE: 81 BPM | SYSTOLIC BLOOD PRESSURE: 120 MMHG | BODY MASS INDEX: 19.27 KG/M2 | HEIGHT: 64 IN

## 2024-05-10 DIAGNOSIS — R42 DIZZINESS: ICD-10-CM

## 2024-05-10 PROCEDURE — 3078F DIAST BP <80 MM HG: CPT | Performed by: INTERNAL MEDICINE

## 2024-05-10 PROCEDURE — 3074F SYST BP LT 130 MM HG: CPT | Performed by: INTERNAL MEDICINE

## 2024-05-10 PROCEDURE — 99203 OFFICE O/P NEW LOW 30 MIN: CPT | Performed by: INTERNAL MEDICINE

## 2024-05-10 ASSESSMENT — FIBROSIS 4 INDEX: FIB4 SCORE: 0.31

## 2024-05-10 NOTE — PROGRESS NOTES
[facility]  [address] [place] [address], [address]. [place] [address]  Phone: [phone] Patient Name: Barb Valdez  : 1995  MRN: [id number]     ASSESSMENT / PLAN       Barb Valdez is a 28 y.o. RHD female presenting for lightheadedness    Lightheadedness  Symptom onset 2023, with lightheadedness and foggy sensation.  Elevated heart rate noted during this time.  She was also breast-feeding and trying to hydrate well.  After 4 weeks, the symptoms progressively improved and no longer having issues with exertion.  Lightheadedness was not related to any migraines, activities, sleep issues, and episode of palpitations was likely related to the Prozac. Exam today unremarkable.  Suspect lightheadedness was related to physiologic changes while she was breast-feeding and having excess milk production.  Improvement over time is reassuring.    Tension headaches  Averages 2 times a week, uses Tylenol with good relief.  No clear triggers.  No migrainous symptoms.  - Consider magnesium or riboflavin (vitamin B2) four frequent tension headaches.    No orders of the defined types were placed in this encounter.    - Return if symptoms worsen or fail to improve.    Karthik Carlson DO  Neurology, Movement Disorders Specialist    BILLING DOCUMENTATION:   I spent 30 minutes reviewing the medical record, interviewing and examining the patient, discussing diagnosis and treatment, and coordinating care.    HISTORY OF PRESENT ILLNESS      Barb Valdez is a 28 y.o. RHD female presenting for lightheadedness  PMHx: post-partum depression    Initial HPI 05/10/24  Works as RN in radiology, MRI at [employer]    2023: felt lightheaded, dizzy, foggy sensation for constant 4 weeks. Constant all day. Not worse with position changes, head movement. Horizontal position can rarely result in vertigo episodes, but lightheadedness did not get better. Was breast feeding during that time, donating due to excess. Also  "post-partum depression ongoing. Drinking water often, checking BP often (normal) and -110 (baseline 80), sometimes needing to take time off due to severity. Didn't skip meals. No sleep issues at night. No numbness in feet. No vision changes.     Prozac worsened dizziness and became more tachycardic, then switching to Lexapro helped. No new meds aside from this.     Lightheadedness tapered off over time. Dizziness improved since then without any other modifications. Regularly does pure barre and tennis, no issues with exertion now. Previously the lightheadedness was affecting her activities.     Hx migraines in middle school and high school. No longer since then. Tension headaches (whole head) not correlated with dizziness. Neg light/sound sensitivity, nausea. Tylenol 1000mg works 80-90% of time. Headaches \"annoying\" but still able to work. Frequency: 2x/week. No clear triggers.     Past Medical History:   Diagnosis Date    Anxiety     Depression     History of migraine with aura     resolved in high school    IUD (intrauterine device) in place        History reviewed. No pertinent surgical history.    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   Other Topics Concern    Not on file   Social History Narrative    Not on file     Social " Determinants of Health     Financial Resource Strain: Low Risk  (2/6/2024)    Overall Financial Resource Strain (CARDIA)     Difficulty of Paying Living Expenses: Not hard at all   Food Insecurity: No Food Insecurity (2/6/2024)    Hunger Vital Sign     Worried About Running Out of Food in the Last Year: Never true     Ran Out of Food in the Last Year: Never true   Transportation Needs: No Transportation Needs (2/6/2024)    PRAPARE - Transportation     Lack of Transportation (Medical): No     Lack of Transportation (Non-Medical): No   Physical Activity: Insufficiently Active (2/6/2024)    Exercise Vital Sign     Days of Exercise per Week: 2 days     Minutes of Exercise per Session: 60 min   Stress: Stress Concern Present (2/6/2024)    [place] [employer] - Occupational Stress Questionnaire     Feeling of Stress : To some extent   Social Connections: Moderately Isolated (2/6/2024)    Social Connection and Isolation Panel [NHANES]     Frequency of Communication with Friends and Family: More than three times a week     Frequency of Social Gatherings with Friends and Family: Once a week     Attends Hoahaoism Services: Never     Active Member of Clubs or Organizations: No     Attends Club or Organization Meetings: Never     Marital Status:    Intimate Partner Violence: Not on file   Housing Stability: Low Risk  (2/6/2024)    Housing Stability Vital Sign     Unable to Pay for Housing in the Last Year: No     Number of Places Lived in the Last Year: 1     Unstable Housing in the Last Year: No       Current Outpatient Medications   Medication    LORazepam (ATIVAN) 0.5 MG Tab    LEXAPRO 10 MG Tab    Prenatal MV-Min-Fe Fum-FA-DHA (PRENATAL 1 PO)     No current facility-administered medications for this visit.       No Known Allergies    DATA / RESULTS      TSH   Date Value Ref Range Status   12/29/2023 0.850 0.380 - 5.330 uIU/mL Final     Comment:     The 2011 American Thyroid Association (BRYN)  "guidelines  recommended that the interpretation of thyroid function in  pregnancy be based on trimester specific reference ranges.    1st Trimester  0.100-2.500 mIU/L  2nd Trimester  0.200-3.000 mIU/L  3rd Trimester  0.300-3.500 mIU/L    These established reference ranges have not been validated  at [facility].       Glycohemoglobin   Date Value Ref Range Status   12/29/2023 5.0 4.0 - 5.6 % Final     Comment:     Increased risk for diabetes:  5.7 -6.4%  Diabetes:  >6.4%  Glycemic control for adults with diabetes:  <7.0%    The above interpretations are per ADA guidelines.  Diagnosis  of diabetes mellitus on the basis of elevated Hemoglobin A1c  should be confirmed by repeating the Hb A1c test.       LDL   Date Value Ref Range Status   03/12/2021 135 (H) <100 mg/dL Final          OBJECTIVE      Vitals:    05/10/24 0733   BP: 120/64   BP Location: Left arm   Patient Position: Sitting   BP Cuff Size: Adult   Pulse: 81   Temp: 36.2 °C (97.2 °F)   TempSrc: Temporal   SpO2: 99%   Weight: 51.2 kg (112 lb 14 oz)   Height: 1.626 m (5' 4.02\")     Physical Exam     General: NAD, appears stated age.      Mental status: Speech clear and fluent without tremor. Fund of knowledge is good.    Cranial Nerves:  CN2: PERRL. Visual fields are full to finger confrontation.   CN3/4/6: EOMI. There is no nystagmus. Saccades are normal.   CN5: V1-V3 intact to light touch    CN7: Symmetric face.   CN8: Hearing grossly intact.   CN9/10/12: Soft palate and uvula rise symmetrically. Tongue midline.   CN11: Shoulder shrug intact bilaterally.     Strength  Right Left   Shoulder Abduction  5 5   Elbow Flexion 5 5   Elbow Extension  5 5   Wrist Extension  5 5   Finger Extension  5 5   Hip Flexion  5 5   Knee Extension 5 5   Ankle Dorsiflexion  5 5     Deep Tendon Reflexes: 2+ biceps, brachioradialis, patella and ankles.     Sensory: normal to light touch    Cerebellar: No dysmetria with FTN or heel-to-shin.    Gait:   Posture - " normal   Base - narrow   Stride length - normal   Arm swing - normal   Speed - normal  Shuffling/freezing - none  U-Turn - normal   Romberg - normal   Heel, toe, and tandem - normal     PROCEDURE   N/A

## 2024-05-21 ENCOUNTER — APPOINTMENT (RX ONLY)
Dept: URBAN - METROPOLITAN AREA CLINIC 15 | Facility: CLINIC | Age: 29
Setting detail: DERMATOLOGY
End: 2024-05-21

## 2024-05-21 DIAGNOSIS — Z71.89 OTHER SPECIFIED COUNSELING: ICD-10-CM

## 2024-05-21 DIAGNOSIS — D22 MELANOCYTIC NEVI: ICD-10-CM

## 2024-05-21 PROBLEM — D22.61 MELANOCYTIC NEVI OF RIGHT UPPER LIMB, INCLUDING SHOULDER: Status: ACTIVE | Noted: 2024-05-21

## 2024-05-21 PROBLEM — D22.62 MELANOCYTIC NEVI OF LEFT UPPER LIMB, INCLUDING SHOULDER: Status: ACTIVE | Noted: 2024-05-21

## 2024-05-21 PROCEDURE — 99203 OFFICE O/P NEW LOW 30 MIN: CPT

## 2024-05-21 PROCEDURE — ? PHOTO-DOCUMENTATION

## 2024-05-21 PROCEDURE — ? SUNSCREEN RECOMMENDATIONS

## 2024-05-21 PROCEDURE — ? COUNSELING

## 2024-05-21 ASSESSMENT — LOCATION ZONE DERM: LOCATION ZONE: ARM

## 2024-05-21 ASSESSMENT — LOCATION DETAILED DESCRIPTION DERM
LOCATION DETAILED: LEFT VENTRAL DISTAL FOREARM
LOCATION DETAILED: RIGHT DISTAL DORSAL FOREARM

## 2024-05-21 ASSESSMENT — LOCATION SIMPLE DESCRIPTION DERM
LOCATION SIMPLE: RIGHT FOREARM
LOCATION SIMPLE: LEFT FOREARM

## 2024-08-23 ENCOUNTER — HOSPITAL ENCOUNTER (OUTPATIENT)
Facility: MEDICAL CENTER | Age: 29
End: 2024-08-23
Attending: OBSTETRICS & GYNECOLOGY
Payer: COMMERCIAL

## 2024-08-23 ENCOUNTER — GYNECOLOGY VISIT (OUTPATIENT)
Dept: OBGYN | Facility: CLINIC | Age: 29
End: 2024-08-23
Payer: COMMERCIAL

## 2024-08-23 DIAGNOSIS — Z30.432 ENCOUNTER FOR IUD REMOVAL: ICD-10-CM

## 2024-08-23 DIAGNOSIS — Z01.419 WOMEN'S ANNUAL ROUTINE GYNECOLOGICAL EXAMINATION: ICD-10-CM

## 2024-08-23 DIAGNOSIS — Z12.4 SCREENING FOR CERVICAL CANCER: ICD-10-CM

## 2024-08-23 DIAGNOSIS — Z31.69 ENCOUNTER FOR PRECONCEPTION CONSULTATION: ICD-10-CM

## 2024-08-23 PROCEDURE — 88175 CYTOPATH C/V AUTO FLUID REDO: CPT

## 2024-08-23 PROCEDURE — 87591 N.GONORRHOEAE DNA AMP PROB: CPT

## 2024-08-23 PROCEDURE — 58301 REMOVE INTRAUTERINE DEVICE: CPT | Performed by: OBSTETRICS & GYNECOLOGY

## 2024-08-23 PROCEDURE — 99395 PREV VISIT EST AGE 18-39: CPT | Mod: 25 | Performed by: OBSTETRICS & GYNECOLOGY

## 2024-08-23 PROCEDURE — 87491 CHLMYD TRACH DNA AMP PROBE: CPT

## 2024-08-23 NOTE — PROGRESS NOTES
ANNUAL GYNECOLOGY VISIT    Chief Complaint  IUD Removal      Subjective  Barb Valdez is a 29 y.o. female who presents today for Annual Exam.  She would also like her Mirena IUD removed as she would like to conceive. She has already started taking a prenatal vitamin. She has a history of  birth and severe nausea with pregnancy. We discussed the nausea and what anti-nausea medication would be safe with the long QT on her problem list. She reports that this was just with Prozac and she has since had a normal EKG.     Preventive Care   Immunization History   Administered Date(s) Administered    9VHPV VACCINE 2-3 DOSE IM (GARDASIL 9) 2015    HPV Quadrivalent Vaccine (GARDASIL) - HISTORICAL DATA 2014, 2015    Hepatitis B Vaccine Adolescent/Pediatric 1995, 1995, 1996    INFLUENZA TIV (IM) 2016, 2017, 10/11/2018    Influenza (IM) Preservative Free - HISTORICAL DATA 2016, 2017, 10/11/2018    Influenza Vaccine Quad Inj (Pf) 2016, 2019, 2020, 2021, 10/11/2022, 10/02/2023    Influenza, Unspecified - HISTORICAL DATA 10/11/2022    MMR Vaccine 1996, 1999    PFIZER BIVALENT SARS-COV-2 VACCINE (12+) 2022    PFIZER PURPLE CAP SARS-COV-2 VACCINATION (12+) 2020, 2021, 2021    Tdap Vaccine 2017, 2023     Last Mammogram: Not yet indicated    Gynecology History and ROS  Current Sexual Activity: Yes  History of sexually transmitted diseases?  no  Abnormal discharge? No  Current Contraception:  Mirena (to be removed today)    Menstrual History  No LMP recorded.  Has Mirena IUD in place so irregular and light menstruation      Pap History  Last pap smear:   History of moderate or severe dysplasia: No    Cancer Risk Assessement:  Family history of:   - Breast cancer: no   - Ovarian cancer: no   - Uterine cancer: no   - Colon cancer: no    Obstetric History  OB History    Para Term   AB Living   1 1 0 1 0 1   SAB IAB Ectopic Molar Multiple Live Births   0 0 0 0 0 1      # Outcome Date GA Lbr Yusuf/2nd Weight Sex Type Anes PTL Lv   1  23 36w0d 01::29 6 lb 4.2 oz F Vag-Spont None Y FAM       Past Medical History  Past Medical History:   Diagnosis Date    Anxiety     Depression     History of migraine with aura     resolved in high school    IUD (intrauterine device) in place        Past Surgical History  No past surgical history on file.    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   Other Topics Concern    Not on file   Social History Narrative    Not on file     Social Determinants of Health     Financial Resource Strain: Low Risk  (2024)    Overall Financial Resource Strain (CARDIA)     Difficulty of Paying Living Expenses: Not hard at all   Food Insecurity: No Food Insecurity (2024)    Hunger Vital Sign     Worried About Running Out of Food in the Last Year: Never true     Ran Out of Food in the Last Year: Never true   Transportation Needs: No Transportation Needs (2024)    PRAPARE - Transportation     Lack of Transportation (Medical): No     Lack of Transportation (Non-Medical): No   Physical Activity: Insufficiently Active (2024)    Exercise Vital Sign     Days of Exercise per Week: 2 days     Minutes of Exercise per Session: 60 min   Stress: Stress Concern Present (2024)    [place] [employer] - Occupational Stress Questionnaire     Feeling of Stress : To some extent   Social Connections: Moderately Isolated (2/6/2024)    Social Connection and Isolation Panel [NHANES]     Frequency of Communication with Friends and Family: More than three times a week     Frequency of Social Gatherings with Friends and Family: Once a week     Attends Mosque Services: Never     Active Member of Clubs or Organizations: No     Attends Club or Organization Meetings: Never     Marital Status:    Intimate Partner Violence: Not on file   Housing Stability: Low Risk  (2/6/2024)    Housing Stability Vital Sign     Unable to Pay for Housing in the Last Year: No     Number of Places Lived in the Last Year: 1     Unstable Housing in the Last Year: No       Family History  Family History   Problem Relation Age of Onset    Hyperlipidemia Mother     Heart Disease Father         50    No Known Problems Daughter        Home Medications  Current Outpatient Medications on File Prior to Visit   Medication Sig Dispense Refill    LORazepam (ATIVAN) 0.5 MG Tab TAKE 0.5-1 ORAL TABLET AS NEEDED (6-8) FOR SEVERE PANIC      LEXAPRO 10 MG Tab Take 10 mg by mouth every day.      Prenatal MV-Min-Fe Fum-FA-DHA (PRENATAL 1 PO) Take  by mouth.       No current facility-administered medications on file prior to visit.       Allergies/Reactions  No Known Allergies    ROS  Positive ROS: no  Gen: no fevers or chills, no significant weight loss or gain, excessive fatigue  Respiratory:  no cough or dyspnea  Cardiac:  no chest pain, no palpitations, no syncope  Breast: no breast discharge, pain, lump or skin changes  GI:  no heartburn, no abdominal pain, no nausea or vomiting  Urinary: no dysuria, urgency, frequency, incontinence   Psych: no depression or anxiety  Neuro: no migraines with aura, fainting spells, numbness or tingling  Extremities: no joint pain, persistently swollen ankles, recurrent leg cramps      Physical Examination:  Vital  "Signs: There were no vitals filed for this visit.  There is no height or weight on file to calculate BMI.    Constitutional: The patient is well developed and well nourished.  Psychiatric: Patient is oriented to time place and person.   Skin: No rash observed.  Neck: Appears symmetric. Thyroid normal size  Respiratory: normal effort  Breast: Declined  Abdomen: Soft, non-tender.  Pelvic Exam:     Vulva: external female genitalia are normal in appearance. No lesions    Urethra - no lesions, no erythema    Vagina: moist, pink, normal ruggae    Cervix: pink, with fine vascularity    Uterus - non-tender, normal size, shape, contour, mobile, anteverted    Ovaries: non-tender, no appreciable masses  Pap Smear performed: Yes  Extremeties: Legs are symmetric and without tenderness. There is no edema present.    Labs/Imaging:  HDL (mg/dL)   Date Value   2021 44     LDL (mg/dL)   Date Value   2021 135 (H)     No components found for: \"A1C1\"  WBC (K/uL)   Date Value   2023 7.1     Lab Results   Component Value Date/Time    CO2 23 2023 1255    BUN 12 2023 1255           Assessment & Plan  Barb Valdez is a 29 y.o. female who presents today for Annual Gyn Exam.     1. Women's annual routine gynecological examination  - Pap smear performed  - She is not yet due for colon cancer screening or a mammogram    2. Encounter for IUD removal  - Consent for all Surgical, Special Diagnostic or Therapeutic Procedures  - See separate procedure note    3. Screening for cervical cancer  - THINPREP RFLX HPV ASCUS W/CTNG; Future    4. Encounter for preconception consultation  - Already taking a prenatal vitamin  - Will need [facility] to [facility] due to history of  birth       Return: Annually or PRN    Jose Sullivan M.D.        "

## 2024-08-23 NOTE — PROCEDURES
IUD Removal    Date/Time: 8/23/2024 7:51 AM    Performed by: Jose Sullivan M.D.  Authorized by: Jose Sullivan M.D.    Consent:     Consent obtained:  Written    Consent given by:  Patient    Procedure risks and benefits discussed: yes      Patient questions answered: yes      Patient agrees, verbalizes understanding, and wants to proceed: yes      Educational handouts given: yes      Instructions and paperwork completed: yes    Pre-procedure details:     Reason for removal: desire for fertility      IUD placed at this facility: yes      Length of time IUD in place:  1 year  Procedure:     Pelvic exam performed: yes      Speculum placed: yes      IUD strings visualized in external os: yes      Removal mechanism:  Ring forceps    IUD removed intact: yes      IUD type removed:  Mirena    Removal complications: no    Post-procedure:     New birth control prescribed: no        Jose Sullivan M.D.

## 2024-08-28 LAB
C TRACH RRNA CVX QL NAA+PROBE: NEGATIVE
COMMENT NL11729A: NORMAL
CYTOLOGIST CVX/VAG CYTO: NORMAL
CYTOLOGY CVX/VAG DOC CYTO: NORMAL
CYTOLOGY CVX/VAG DOC THIN PREP: NORMAL
N GONORRHOEA RRNA CVX QL NAA+PROBE: NEGATIVE
NOTE NL11727A: NORMAL
OTHER STN SPEC: NORMAL
STAT OF ADQ CVX/VAG CYTO-IMP: NORMAL

## 2024-09-26 ENCOUNTER — APPOINTMENT (OUTPATIENT)
Dept: OCCUPATIONAL MEDICINE | Facility: CLINIC | Age: 29
End: 2024-09-26

## 2024-09-26 DIAGNOSIS — Z23 NEED FOR VACCINATION: Primary | ICD-10-CM

## 2024-09-26 PROCEDURE — 90656 IIV3 VACC NO PRSV 0.5 ML IM: CPT | Performed by: PREVENTIVE MEDICINE

## 2024-10-18 ENCOUNTER — APPOINTMENT (OUTPATIENT)
Dept: MEDICAL GROUP | Facility: MEDICAL CENTER | Age: 29
End: 2024-10-18
Payer: COMMERCIAL

## 2024-10-18 VITALS
WEIGHT: 118.2 LBS | OXYGEN SATURATION: 98 % | BODY MASS INDEX: 20.18 KG/M2 | HEART RATE: 80 BPM | TEMPERATURE: 97.2 F | SYSTOLIC BLOOD PRESSURE: 100 MMHG | HEIGHT: 64 IN | DIASTOLIC BLOOD PRESSURE: 48 MMHG

## 2024-10-18 DIAGNOSIS — Z02.89 ENCOUNTER FOR COMPLETION OF FORM WITH PATIENT: ICD-10-CM

## 2024-10-18 DIAGNOSIS — F41.1 GAD (GENERALIZED ANXIETY DISORDER): ICD-10-CM

## 2024-10-18 DIAGNOSIS — Z3A.08 8 WEEKS GESTATION OF PREGNANCY: ICD-10-CM

## 2024-10-18 PROCEDURE — 99213 OFFICE O/P EST LOW 20 MIN: CPT

## 2024-10-18 PROCEDURE — 3074F SYST BP LT 130 MM HG: CPT

## 2024-10-18 PROCEDURE — 3078F DIAST BP <80 MM HG: CPT

## 2024-10-18 ASSESSMENT — ENCOUNTER SYMPTOMS
CHILLS: 0
ORTHOPNEA: 0
PALPITATIONS: 0
COUGH: 0
SHORTNESS OF BREATH: 0
FEVER: 0

## 2024-10-18 ASSESSMENT — FIBROSIS 4 INDEX: FIB4 SCORE: 0.32

## 2024-10-31 ENCOUNTER — TELEPHONE (OUTPATIENT)
Dept: OBGYN | Facility: CLINIC | Age: 29
End: 2024-10-31
Payer: COMMERCIAL

## 2024-11-15 ENCOUNTER — HOSPITAL ENCOUNTER (OUTPATIENT)
Dept: LAB | Facility: MEDICAL CENTER | Age: 29
End: 2024-11-15
Attending: OBSTETRICS & GYNECOLOGY
Payer: COMMERCIAL

## 2024-11-15 LAB
ABO GROUP BLD: NORMAL
BASOPHILS # BLD AUTO: 0.5 % (ref 0–1.8)
BASOPHILS # BLD: 0.04 K/UL (ref 0–0.12)
BLD GP AB SCN SERPL QL: NORMAL
EOSINOPHIL # BLD AUTO: 0.28 K/UL (ref 0–0.51)
EOSINOPHIL NFR BLD: 3.3 % (ref 0–6.9)
ERYTHROCYTE [DISTWIDTH] IN BLOOD BY AUTOMATED COUNT: 40.1 FL (ref 35.9–50)
HBV SURFACE AG SER QL: ABNORMAL
HCT VFR BLD AUTO: 43.3 % (ref 37–47)
HCV AB SER QL: NORMAL
HGB BLD-MCNC: 13.8 G/DL (ref 12–16)
HIV 1+2 AB+HIV1 P24 AG SERPL QL IA: NORMAL
IMM GRANULOCYTES # BLD AUTO: 0.03 K/UL (ref 0–0.11)
IMM GRANULOCYTES NFR BLD AUTO: 0.4 % (ref 0–0.9)
LYMPHOCYTES # BLD AUTO: 2.3 K/UL (ref 1–4.8)
LYMPHOCYTES NFR BLD: 27.1 % (ref 22–41)
MCH RBC QN AUTO: 28.9 PG (ref 27–33)
MCHC RBC AUTO-ENTMCNC: 31.9 G/DL (ref 32.2–35.5)
MCV RBC AUTO: 90.8 FL (ref 81.4–97.8)
MONOCYTES # BLD AUTO: 0.59 K/UL (ref 0–0.85)
MONOCYTES NFR BLD AUTO: 6.9 % (ref 0–13.4)
NEUTROPHILS # BLD AUTO: 5.25 K/UL (ref 1.82–7.42)
NEUTROPHILS NFR BLD: 61.8 % (ref 44–72)
NRBC # BLD AUTO: 0 K/UL
NRBC BLD-RTO: 0 /100 WBC (ref 0–0.2)
PLATELET # BLD AUTO: 320 K/UL (ref 164–446)
PMV BLD AUTO: 10.2 FL (ref 9–12.9)
RBC # BLD AUTO: 4.77 M/UL (ref 4.2–5.4)
RH BLD: NORMAL
RUBV AB SER QL: 14.2 IU/ML
T PALLIDUM AB SER QL IA: ABNORMAL
WBC # BLD AUTO: 8.5 K/UL (ref 4.8–10.8)

## 2024-11-15 PROCEDURE — 86780 TREPONEMA PALLIDUM: CPT

## 2024-11-15 PROCEDURE — 86803 HEPATITIS C AB TEST: CPT

## 2024-11-15 PROCEDURE — 86900 BLOOD TYPING SEROLOGIC ABO: CPT

## 2024-11-15 PROCEDURE — 86850 RBC ANTIBODY SCREEN: CPT

## 2024-11-15 PROCEDURE — 87086 URINE CULTURE/COLONY COUNT: CPT

## 2024-11-15 PROCEDURE — 86762 RUBELLA ANTIBODY: CPT

## 2024-11-15 PROCEDURE — 87340 HEPATITIS B SURFACE AG IA: CPT

## 2024-11-15 PROCEDURE — 87389 HIV-1 AG W/HIV-1&-2 AB AG IA: CPT

## 2024-11-15 PROCEDURE — 36415 COLL VENOUS BLD VENIPUNCTURE: CPT

## 2024-11-15 PROCEDURE — 87077 CULTURE AEROBIC IDENTIFY: CPT

## 2024-11-15 PROCEDURE — 85025 COMPLETE CBC W/AUTO DIFF WBC: CPT

## 2024-11-15 PROCEDURE — 86901 BLOOD TYPING SEROLOGIC RH(D): CPT

## 2024-11-27 ENCOUNTER — OFFICE VISIT (OUTPATIENT)
Dept: URGENT CARE | Facility: CLINIC | Age: 29
End: 2024-11-27
Payer: COMMERCIAL

## 2024-11-27 VITALS
SYSTOLIC BLOOD PRESSURE: 100 MMHG | RESPIRATION RATE: 14 BRPM | OXYGEN SATURATION: 100 % | DIASTOLIC BLOOD PRESSURE: 50 MMHG | HEIGHT: 64 IN | HEART RATE: 83 BPM | TEMPERATURE: 97.9 F | WEIGHT: 121.8 LBS | BODY MASS INDEX: 20.79 KG/M2

## 2024-11-27 DIAGNOSIS — J32.9 BACTERIAL SINUSITIS: ICD-10-CM

## 2024-11-27 DIAGNOSIS — B96.89 BACTERIAL SINUSITIS: ICD-10-CM

## 2024-11-27 ASSESSMENT — FIBROSIS 4 INDEX: FIB4 SCORE: 0.29

## 2024-11-27 NOTE — LETTER
November 27, 2024    To Whom It May Concern:         This is confirmation that Barb Valdez attended her scheduled appointment with Leanne Carter M.D. on 11/27/24. She may return to work 12/2/2024 without any restrictions.          If you have any questions please do not hesitate to call me at the phone number listed below.    Sincerely,          Leanne Carter M.D.  [phone]

## 2024-11-27 NOTE — PROGRESS NOTES
"  Subjective:      29 y.o. female presents to urgent care for cold symptoms that started about 10 days ago. She is experiencing increased sinus pressure, nasal congestion, and ear pain. No tobacco product use. No history of asthma or COPD. She is vaccinated against COVID.  Her daughter was recently sick with similar symptoms.     She denies any other questions or concerns at this time.    Current problem list, medication, and past medical/surgical history were reviewed in Epic.    ROS  See HPI     Objective:      /50 (BP Location: Left arm, Patient Position: Sitting, BP Cuff Size: Adult)   Pulse 83   Temp 36.6 °C (97.9 °F) (Temporal)   Resp 14   Ht 1.626 m (5' 4\")   Wt 55.2 kg (121 lb 12.8 oz)   SpO2 100%   BMI 20.91 kg/m²     Physical Exam  Constitutional:       General: She is not in acute distress.     Appearance: She is not diaphoretic.   HENT:      Right Ear: Tympanic membrane, ear canal and external ear normal.      Left Ear: Tympanic membrane, ear canal and external ear normal.      Nose:      Right Sinus: No maxillary sinus tenderness or frontal sinus tenderness.      Left Sinus: Maxillary sinus tenderness and frontal sinus tenderness present.      Mouth/Throat:      Tongue: Tongue does not deviate from midline.      Palate: No lesions.      Pharynx: Uvula midline. No oropharyngeal exudate or posterior oropharyngeal erythema.      Tonsils: No tonsillar exudate. 1+ on the right. 1+ on the left.   Cardiovascular:      Rate and Rhythm: Normal rate and regular rhythm.      Heart sounds: Normal heart sounds.   Pulmonary:      Effort: Pulmonary effort is normal. No respiratory distress.      Breath sounds: Normal breath sounds.   Neurological:      Mental Status: She is alert.   Psychiatric:         Mood and Affect: Affect normal.         Judgment: Judgment normal.       Assessment/Plan:     1. Bacterial sinusitis  Criteria for bacterial sinusitis has been met.  Prescription for Augmentin has been " sent.  Tylenol as needed for symptomatic relief.  - amoxicillin-clavulanate (AUGMENTIN) 875-125 MG Tab; Take 1 Tablet by mouth 2 times a day for 5 days.  Dispense: 10 Tablet; Refill: 0      Instructed to return to Urgent Care or nearest Emergency Department if symptoms fail to improve, for any change in condition, further concerns, or new concerning symptoms. Patient states understanding of the plan of care and discharge instructions.    Leanne Carter M.D.

## 2024-12-29 ENCOUNTER — OFFICE VISIT (OUTPATIENT)
Dept: URGENT CARE | Facility: CLINIC | Age: 29
End: 2024-12-29
Payer: COMMERCIAL

## 2024-12-29 VITALS
HEIGHT: 64 IN | DIASTOLIC BLOOD PRESSURE: 58 MMHG | BODY MASS INDEX: 21.34 KG/M2 | TEMPERATURE: 97.2 F | WEIGHT: 125 LBS | SYSTOLIC BLOOD PRESSURE: 106 MMHG | OXYGEN SATURATION: 95 % | HEART RATE: 88 BPM | RESPIRATION RATE: 16 BRPM

## 2024-12-29 DIAGNOSIS — H92.01 ACUTE PAIN OF RIGHT EAR: ICD-10-CM

## 2024-12-29 DIAGNOSIS — J01.00 ACUTE MAXILLARY SINUSITIS, RECURRENCE NOT SPECIFIED: ICD-10-CM

## 2024-12-29 ASSESSMENT — FIBROSIS 4 INDEX: FIB4 SCORE: 0.29

## 2024-12-29 ASSESSMENT — ENCOUNTER SYMPTOMS
CHILLS: 0
FEVER: 0
SINUS PAIN: 1

## 2024-12-29 NOTE — PROGRESS NOTES
Subjective     Barb Valdez is a 29 y.o. female who presents with Sinus Pain (Sinus pain on right side, nasal drainage x 10 days)    PMH:  has a past medical history of Anxiety, Depression, History of migraine with aura, and IUD (intrauterine device) in place.  MEDS:   Current Outpatient Medications:     Progesterone 200 MG Suppos, Insert  into the vagina., Disp: , Rfl:      LORazepam (ATIVAN) 0.5 MG Tab, TAKE 0.5-1 ORAL TABLET AS NEEDED (6-8) FOR SEVERE PANIC, Disp: , Rfl:     LEXAPRO 10 MG Tab, Take 10 mg by mouth every day., Disp: , Rfl:     Prenatal MV-Min-Fe Fum-FA-DHA (PRENATAL 1 PO), Take  by mouth., Disp: , Rfl:   ALLERGIES: No Known Allergies  SURGHX: History reviewed. No pertinent surgical history.  SOCHX:  reports that she has never smoked. She has never used smokeless tobacco. She reports that she does not currently use alcohol. She reports that she does not use drugs.  FH: Reviewed with patient, not pertinent to this visit.           Patient presents with:  Sinus Pain: Sinus pain on right side, nasal drainage x 10 days.  Patient's whole family has been sick for the last 2 weeks, everyone else that is is resolving, hers has persisted, she now has pain and pressure in her right cheek, right maxillary sinus and right ear.  Patient has a history of sinusitis, states it feels same as last time.  Patient is pregnant so she is used to the stuffiness but this is very different.  Patient has tried over-the-counter medications as well as saline nasal spray and humidifier with no improvement in her symptoms.  No other complaints.    Sinus Pain  Associated symptoms include congestion. Pertinent negatives include no chills or fever.       Review of Systems   Constitutional:  Negative for chills and fever.   HENT:  Positive for congestion, ear pain and sinus pain.    All other systems reviewed and are negative.             Objective     /58 (BP Location: Left arm, Patient Position: Sitting, BP Cuff Size:  "Adult)   Pulse 88   Temp 36.2 °C (97.2 °F) (Temporal)   Resp 16   Ht 1.626 m (5' 4\")   Wt 56.7 kg (125 lb)   SpO2 95%   BMI 21.46 kg/m²      Physical Exam  Vitals and nursing note reviewed.   Constitutional:       General: She is not in acute distress.     Appearance: Normal appearance. She is well-developed. She is not ill-appearing or toxic-appearing.   HENT:      Head: Normocephalic and atraumatic.      Right Ear: No middle ear effusion. Tympanic membrane is retracted. Tympanic membrane is not injected or erythematous.      Left Ear: Tympanic membrane normal.      Nose: Nasal tenderness, mucosal edema and congestion present.      Right Sinus: Maxillary sinus tenderness present.      Mouth/Throat:      Lips: Pink.      Mouth: Mucous membranes are moist.      Pharynx: Oropharynx is clear. Uvula midline.   Eyes:      Extraocular Movements: Extraocular movements intact.      Conjunctiva/sclera: Conjunctivae normal.      Pupils: Pupils are equal, round, and reactive to light.   Cardiovascular:      Rate and Rhythm: Normal rate and regular rhythm.      Pulses: Normal pulses.      Heart sounds: Normal heart sounds.   Pulmonary:      Effort: Pulmonary effort is normal.      Breath sounds: Normal breath sounds.   Abdominal:      General: Bowel sounds are normal.      Palpations: Abdomen is soft.   Musculoskeletal:         General: Normal range of motion.      Cervical back: Normal range of motion and neck supple.   Skin:     General: Skin is warm and dry.      Capillary Refill: Capillary refill takes less than 2 seconds.   Neurological:      General: No focal deficit present.      Mental Status: She is alert and oriented to person, place, and time.      Cranial Nerves: No cranial nerve deficit.      Motor: Motor function is intact.      Coordination: Coordination is intact.      Gait: Gait normal.   Psychiatric:         Mood and Affect: Mood normal.                             Assessment & Plan        Assessment & " Plan  Acute maxillary sinusitis, recurrence not specified    Orders:    amoxicillin-clavulanate (AUGMENTIN) 875-125 MG Tab; Take 1 Tablet by mouth 2 times a day for 10 days.    Acute pain of right ear    Orders:    amoxicillin-clavulanate (AUGMENTIN) 875-125 MG Tab; Take 1 Tablet by mouth 2 times a day for 10 days.              Patient HPI physical exam consistent with acute maxillary sinusitis causing ear pain and pressure as well.  I will treat with Augmentin twice daily x 10 days as her previous sinus infection was about 2-1/2 months ago.    Advised Saline nasal spray for relief of congestion.    Humidifier in bedroom may help congestion and cough.       Differential diagnosis, supportive care, and indications for immediate follow-up discussed with patient.  Instructed to return to clinic or nearest emergency department for any change in condition, further concerns, or worsening of symptoms.    I personally reviewed prior external notes and test results pertinent to today's visit.  I have independently reviewed and interpreted all diagnostics ordered during this urgent care visit.    PT should follow up with PCP in 1-2 days for re-evaluation if symptoms have not improved.      Discussed red flags and reasons to return to UC or ED.      Pt and/or family verbalized understanding of diagnosis and follow up instructions and was offered informational handout on diagnosis.  PT discharged.     Please note that this dictation was created using voice recognition software. I have made every reasonable attempt to correct obvious errors, but I expect that there may be errors of grammar and possibly content that I did not discover before finalizing the note.

## 2025-01-10 ENCOUNTER — HOSPITAL ENCOUNTER (OUTPATIENT)
Facility: MEDICAL CENTER | Age: 30
End: 2025-01-10
Attending: NURSE PRACTITIONER
Payer: COMMERCIAL

## 2025-01-10 PROCEDURE — 87086 URINE CULTURE/COLONY COUNT: CPT

## 2025-01-13 LAB
BACTERIA UR CULT: NORMAL
SIGNIFICANT IND 70042: NORMAL
SITE SITE: NORMAL
SOURCE SOURCE: NORMAL

## 2025-01-24 ENCOUNTER — HOSPITAL ENCOUNTER (OUTPATIENT)
Dept: RADIOLOGY | Facility: MEDICAL CENTER | Age: 30
End: 2025-01-24
Attending: OBSTETRICS & GYNECOLOGY
Payer: COMMERCIAL

## 2025-01-24 DIAGNOSIS — Z36.3 ENCOUNTER FOR ROUTINE SCREENING FOR MALFORMATION USING ULTRASONICS: ICD-10-CM

## 2025-01-24 PROCEDURE — 76805 OB US >/= 14 WKS SNGL FETUS: CPT

## 2025-02-28 ENCOUNTER — HOSPITAL ENCOUNTER (OUTPATIENT)
Dept: LAB | Facility: MEDICAL CENTER | Age: 30
End: 2025-02-28
Attending: NURSE PRACTITIONER
Payer: COMMERCIAL

## 2025-02-28 LAB
25(OH)D3 SERPL-MCNC: 40 NG/ML (ref 30–100)
BASOPHILS # BLD AUTO: 0.6 % (ref 0–1.8)
BASOPHILS # BLD: 0.05 K/UL (ref 0–0.12)
EOSINOPHIL # BLD AUTO: 0.29 K/UL (ref 0–0.51)
EOSINOPHIL NFR BLD: 3.2 % (ref 0–6.9)
ERYTHROCYTE [DISTWIDTH] IN BLOOD BY AUTOMATED COUNT: 45.1 FL (ref 35.9–50)
GLUCOSE 1H P 50 G GLC PO SERPL-MCNC: 171 MG/DL (ref 70–139)
HCT VFR BLD AUTO: 39 % (ref 37–47)
HGB BLD-MCNC: 13.8 G/DL (ref 12–16)
IMM GRANULOCYTES # BLD AUTO: 0.16 K/UL (ref 0–0.11)
IMM GRANULOCYTES NFR BLD AUTO: 1.8 % (ref 0–0.9)
LYMPHOCYTES # BLD AUTO: 1.9 K/UL (ref 1–4.8)
LYMPHOCYTES NFR BLD: 21 % (ref 22–41)
MCH RBC QN AUTO: 33.3 PG (ref 27–33)
MCHC RBC AUTO-ENTMCNC: 35.4 G/DL (ref 32.2–35.5)
MCV RBC AUTO: 94 FL (ref 81.4–97.8)
MONOCYTES # BLD AUTO: 0.57 K/UL (ref 0–0.85)
MONOCYTES NFR BLD AUTO: 6.3 % (ref 0–13.4)
NEUTROPHILS # BLD AUTO: 6.07 K/UL (ref 1.82–7.42)
NEUTROPHILS NFR BLD: 67.1 % (ref 44–72)
NRBC # BLD AUTO: 0 K/UL
NRBC BLD-RTO: 0 /100 WBC (ref 0–0.2)
PLATELET # BLD AUTO: 269 K/UL (ref 164–446)
PMV BLD AUTO: 10 FL (ref 9–12.9)
RBC # BLD AUTO: 4.15 M/UL (ref 4.2–5.4)
T PALLIDUM AB SER QL IA: NORMAL
WBC # BLD AUTO: 9 K/UL (ref 4.8–10.8)

## 2025-02-28 PROCEDURE — 82306 VITAMIN D 25 HYDROXY: CPT

## 2025-02-28 PROCEDURE — 86780 TREPONEMA PALLIDUM: CPT

## 2025-02-28 PROCEDURE — 85025 COMPLETE CBC W/AUTO DIFF WBC: CPT

## 2025-02-28 PROCEDURE — 82950 GLUCOSE TEST: CPT

## 2025-02-28 PROCEDURE — 36415 COLL VENOUS BLD VENIPUNCTURE: CPT

## 2025-03-08 ENCOUNTER — HOSPITAL ENCOUNTER (OUTPATIENT)
Dept: LAB | Facility: MEDICAL CENTER | Age: 30
End: 2025-03-08
Attending: OBSTETRICS & GYNECOLOGY
Payer: COMMERCIAL

## 2025-03-08 LAB
GLUCOSE 1H P CHAL SERPL-MCNC: 164 MG/DL (ref 65–180)
GLUCOSE 2H P CHAL SERPL-MCNC: 150 MG/DL (ref 65–155)
GLUCOSE 3H P CHAL SERPL-MCNC: 136 MG/DL (ref 65–140)
GLUCOSE BS SERPL-MCNC: 82 MG/DL (ref 65–95)

## 2025-03-08 PROCEDURE — 36415 COLL VENOUS BLD VENIPUNCTURE: CPT

## 2025-03-08 PROCEDURE — 82952 GTT-ADDED SAMPLES: CPT

## 2025-03-08 PROCEDURE — 82951 GLUCOSE TOLERANCE TEST (GTT): CPT

## 2025-03-08 PROCEDURE — 86787 VARICELLA-ZOSTER ANTIBODY: CPT | Mod: 91

## 2025-03-11 LAB
VZV IGG SER IA-ACNC: 1.6 S/CO
VZV IGM SER IA-ACNC: 0.43 ISR

## 2025-03-18 ENCOUNTER — HOSPITAL ENCOUNTER (OUTPATIENT)
Facility: MEDICAL CENTER | Age: 30
End: 2025-03-18
Attending: OBSTETRICS & GYNECOLOGY
Payer: COMMERCIAL

## 2025-03-18 PROCEDURE — 87077 CULTURE AEROBIC IDENTIFY: CPT

## 2025-03-18 PROCEDURE — 87086 URINE CULTURE/COLONY COUNT: CPT

## 2025-03-19 LAB
AMBIGUOUS DTTM AMBI4: NORMAL
SIGNIFICANT IND 70042: NORMAL
SITE SITE: NORMAL
SOURCE SOURCE: NORMAL

## 2025-04-15 ENCOUNTER — RESULTS FOLLOW-UP (OUTPATIENT)
Dept: URGENT CARE | Facility: CLINIC | Age: 30
End: 2025-04-15

## 2025-04-15 ENCOUNTER — OFFICE VISIT (OUTPATIENT)
Dept: URGENT CARE | Facility: CLINIC | Age: 30
End: 2025-04-15
Payer: COMMERCIAL

## 2025-04-15 VITALS
SYSTOLIC BLOOD PRESSURE: 110 MMHG | HEART RATE: 125 BPM | OXYGEN SATURATION: 96 % | WEIGHT: 138 LBS | TEMPERATURE: 97.6 F | RESPIRATION RATE: 18 BRPM | HEIGHT: 64 IN | DIASTOLIC BLOOD PRESSURE: 70 MMHG | BODY MASS INDEX: 23.56 KG/M2

## 2025-04-15 DIAGNOSIS — J06.9 VIRAL URI WITH COUGH: ICD-10-CM

## 2025-04-15 LAB
FLUAV RNA SPEC QL NAA+PROBE: NEGATIVE
FLUBV RNA SPEC QL NAA+PROBE: NEGATIVE
RSV RNA SPEC QL NAA+PROBE: NEGATIVE
S PYO DNA SPEC NAA+PROBE: NOT DETECTED
SARS-COV-2 RNA RESP QL NAA+PROBE: NEGATIVE

## 2025-04-15 PROCEDURE — 87651 STREP A DNA AMP PROBE: CPT

## 2025-04-15 PROCEDURE — 3078F DIAST BP <80 MM HG: CPT

## 2025-04-15 PROCEDURE — 0241U POCT CEPHEID COV-2, FLU A/B, RSV - PCR: CPT

## 2025-04-15 PROCEDURE — 99214 OFFICE O/P EST MOD 30 MIN: CPT

## 2025-04-15 PROCEDURE — 3074F SYST BP LT 130 MM HG: CPT

## 2025-04-15 RX ORDER — ONDANSETRON 4 MG/1
4 TABLET, ORALLY DISINTEGRATING ORAL EVERY 6 HOURS PRN
Qty: 15 TABLET | Refills: 0 | Status: SHIPPED | OUTPATIENT
Start: 2025-04-15

## 2025-04-15 ASSESSMENT — ENCOUNTER SYMPTOMS
MYALGIAS: 1
WHEEZING: 0
SPUTUM PRODUCTION: 1
EYE PAIN: 0
CHILLS: 1
HEADACHES: 1
COUGH: 1
SORE THROAT: 1
VOMITING: 1
DIARRHEA: 1
EYE DISCHARGE: 0
NAUSEA: 1
FEVER: 1
EYE REDNESS: 0
ABDOMINAL PAIN: 0
STRIDOR: 0
SHORTNESS OF BREATH: 0

## 2025-04-15 ASSESSMENT — FIBROSIS 4 INDEX: FIB4 SCORE: 0.34

## 2025-04-15 NOTE — LETTER
April 15, 2025         Patient: Barb Valdez   YOB: 1995   Date of Visit: 4/15/2025           To Whom it May Concern:    Barb Valdez was seen in my clinic on 4/15/2025. She should be excused 4/15/25-4/18/25 and can return when she is feeling better.     If you have any questions or concerns, please don't hesitate to call.        Sincerely,           ROBB Bishop.  Electronically Signed

## 2025-04-15 NOTE — PROGRESS NOTES
Subjective:     Chief Complaint   Patient presents with    Sore Throat     Sore throat , diarrhea , headaches and fevers x yesterday        HPI:  Barb Valdez is a 29 y.o. female who presents for symptoms which started yesterday with sudden onset. Pt reports a fever, chills, body aches. Reports associated cough, sore throat, nasal congestion, headache, and fatigue. Also associated nausea, vomiting, and diarrhea.  with similar symptoms. Younger daughter was sick last week. Pt is 34 weeks pregnant. Denies blood in sputum, chest pain, shortness of breath, wheezing. Denies h/o asthma/copd/CAP. No immunocompromise. No recent ABX use. No other aggravating or alleviating factors.      ROS:  Review of Systems   Constitutional:  Positive for chills, fever and malaise/fatigue.   HENT:  Positive for congestion and sore throat. Negative for ear pain.    Eyes:  Negative for pain, discharge and redness.   Respiratory:  Positive for cough and sputum production. Negative for shortness of breath, wheezing and stridor.    Cardiovascular:  Negative for chest pain.   Gastrointestinal:  Positive for diarrhea, nausea and vomiting. Negative for abdominal pain.   Genitourinary:  Negative for dysuria.   Musculoskeletal:  Positive for myalgias.   Skin:  Negative for rash.   Neurological:  Positive for headaches.        CURRENT MEDICATIONS:  Current Outpatient Medications   Medication Sig Refill Last Dispense    LEXAPRO 10 MG Tab Take 10 mg by mouth every day.  Unknown (patient-reported)    LORazepam (ATIVAN) 0.5 MG Tab TAKE 0.5-1 ORAL TABLET AS NEEDED (6-8) FOR SEVERE PANIC  Unknown (patient-reported)    ondansetron (ZOFRAN ODT) 4 MG TABLET DISPERSIBLE Take 1 Tablet by mouth every 6 hours as needed for Nausea/Vomiting for up to 15 doses. 0 Unknown (outside pharmacy)    Prenatal MV-Min-Fe Fum-FA-DHA (PRENATAL 1 PO) Take  by mouth.  Unknown (patient-reported)    Progesterone 200 MG Suppos Insert  into the vagina.  Unknown  "(patient-reported)       ALLERGIES:   No Known Allergies    PROBLEM LIST:    does not have any pertinent problems on file.    Allergies, Medications, & Tobacco/Substance Use were reconciled by the Medical Assistant and reviewed by myself.     Objective:   /70 (BP Location: Left arm, Patient Position: Sitting, BP Cuff Size: Adult)   Pulse (!) 125   Temp 36.4 °C (97.6 °F) (Temporal)   Resp 18   Ht 1.626 m (5' 4\")   Wt 62.6 kg (138 lb)   SpO2 96%   BMI 23.69 kg/m²     Physical Exam  Constitutional:       General: She is not in acute distress.     Appearance: She is not ill-appearing or toxic-appearing.   HENT:      Right Ear: Tympanic membrane normal.      Left Ear: Tympanic membrane normal.      Nose: Congestion present.      Mouth/Throat:      Mouth: Mucous membranes are moist.      Pharynx: Posterior oropharyngeal erythema present. No oropharyngeal exudate.      Tonsils: No tonsillar exudate. 2+ on the right. 2+ on the left.   Eyes:      Conjunctiva/sclera: Conjunctivae normal.      Pupils: Pupils are equal, round, and reactive to light.   Cardiovascular:      Rate and Rhythm: Normal rate and regular rhythm.   Pulmonary:      Effort: Pulmonary effort is normal. No respiratory distress.      Breath sounds: Normal breath sounds. No wheezing, rhonchi or rales.   Abdominal:      General: Abdomen is flat.      Palpations: Abdomen is soft.   Skin:     General: Skin is warm and dry.   Neurological:      Mental Status: She is alert.       Assessment/Plan:   Pt's history and physical exam consistent with viral URI with cough. Testing performed in office with negative results.  Patient informed of results via [facility].  Patient is displaying systemic symptoms including tachycardia.  The symptoms are likely secondary to mild dehydration and the viral process. Otherwise, her vital signs are stable and she is non-toxic appearing. Discussed supportive care measures and maintaining adequate hydration. Patient " demonstrated understanding of treatment plan and agreed to return to the clinic if symptoms worsen or fail to resolve. Strict ED precautions discussed including inability to keep down PO fluids, decreased fetal movement, etc.     Assessment & Plan  Viral URI with cough  Orders:    POCT CEPHEID COV-2, FLU A/B, RSV - PCR    ondansetron (ZOFRAN ODT) 4 MG TABLET DISPERSIBLE; Take 1 Tablet by mouth every 6 hours as needed for Nausea/Vomiting for up to 15 doses.    POCT CEPHEID GROUP A STREP - PCR  - Discussed symptoms most likely viral and self limiting illness, symptoms can last up to 2 weeks. No evidence of a bacterial process.   - Encouraged pt to treat symptoms by using humidified air, salt water gargles, and/or sipping warm liquids.   - Educated pt on use of OTC tylenol per package instructions for body aches, headaches, pain from sore throat.   - Pt encouraged to practice hand hygiene, wear a face mask in public or self isolate, remain well hydrated, and get sufficient rest/sleep.        Discussed differential diagnosis, management options, risks/benefits, and alternatives to planned treatment. Pt expressed understanding and the treatment plan was agreed upon. Questions were encouraged and answered. Pt encouraged to return to urgent care as needed if new or worsening symptoms or if there is no improvement in condition. Pt educated in red flags and indications to immediately call 911 or present to the Emergency Department. Advised the patient to follow-up with the primary care physician for recheck, reevaluation, and further management.    I personally reviewed prior external notes and test results pertinent to today's visit. I have independently reviewed and interpreted all diagnostics ordered during this visit.    Please note that this dictation was created using voice recognition software. I have made a reasonable attempt to correct obvious errors, but I expect that there are errors of grammar and possibly  content that I did not discover before finalizing the note.    This note was electronically signed by BLAS Marks

## 2025-04-17 ENCOUNTER — HOSPITAL ENCOUNTER (INPATIENT)
Facility: MEDICAL CENTER | Age: 30
LOS: 1 days | DRG: 832 | End: 2025-04-18
Attending: OBSTETRICS & GYNECOLOGY | Admitting: OBSTETRICS & GYNECOLOGY
Payer: COMMERCIAL

## 2025-04-17 PROBLEM — O47.00 PRETERM UTERINE CONTRACTIONS: Status: ACTIVE | Noted: 2025-04-17

## 2025-04-17 LAB
APPEARANCE UR: CLEAR
BASOPHILS # BLD AUTO: 0.5 % (ref 0–1.8)
BASOPHILS # BLD: 0.04 K/UL (ref 0–0.12)
BILIRUB UR QL STRIP.AUTO: NEGATIVE
COLOR UR AUTO: YELLOW
EOSINOPHIL # BLD AUTO: 0.01 K/UL (ref 0–0.51)
EOSINOPHIL NFR BLD: 0.1 % (ref 0–6.9)
ERYTHROCYTE [DISTWIDTH] IN BLOOD BY AUTOMATED COUNT: 45.6 FL (ref 35.9–50)
GLUCOSE UR QL STRIP.AUTO: NEGATIVE MG/DL
HCT VFR BLD AUTO: 39.6 % (ref 37–47)
HGB BLD-MCNC: 13 G/DL (ref 12–16)
IMM GRANULOCYTES # BLD AUTO: 0.17 K/UL (ref 0–0.11)
IMM GRANULOCYTES NFR BLD AUTO: 2.1 % (ref 0–0.9)
KETONES UR QL STRIP.AUTO: ABNORMAL MG/DL
LEUKOCYTE ESTERASE UR QL STRIP.AUTO: ABNORMAL
LYMPHOCYTES # BLD AUTO: 1 K/UL (ref 1–4.8)
LYMPHOCYTES NFR BLD: 12.1 % (ref 22–41)
MCH RBC QN AUTO: 30.2 PG (ref 27–33)
MCHC RBC AUTO-ENTMCNC: 32.8 G/DL (ref 32.2–35.5)
MCV RBC AUTO: 92.1 FL (ref 81.4–97.8)
MONOCYTES # BLD AUTO: 0.15 K/UL (ref 0–0.85)
MONOCYTES NFR BLD AUTO: 1.8 % (ref 0–13.4)
NEUTROPHILS # BLD AUTO: 6.92 K/UL (ref 1.82–7.42)
NEUTROPHILS NFR BLD: 83.4 % (ref 44–72)
NITRITE UR QL STRIP.AUTO: NEGATIVE
NRBC # BLD AUTO: 0 K/UL
NRBC BLD-RTO: 0 /100 WBC (ref 0–0.2)
PH UR STRIP.AUTO: 7 [PH] (ref 5–8)
PLATELET # BLD AUTO: 205 K/UL (ref 164–446)
PMV BLD AUTO: 10.3 FL (ref 9–12.9)
PROT UR QL STRIP: NEGATIVE MG/DL
RBC # BLD AUTO: 4.3 M/UL (ref 4.2–5.4)
RBC UR QL AUTO: NEGATIVE
SP GR UR STRIP.AUTO: 1.01 (ref 1–1.03)
T PALLIDUM AB SER QL IA: NORMAL
UROBILINOGEN UR STRIP.AUTO-MCNC: 0.2 MG/DL
WBC # BLD AUTO: 8.3 K/UL (ref 4.8–10.8)

## 2025-04-17 PROCEDURE — 36415 COLL VENOUS BLD VENIPUNCTURE: CPT

## 2025-04-17 PROCEDURE — 700111 HCHG RX REV CODE 636 W/ 250 OVERRIDE (IP): Performed by: OBSTETRICS & GYNECOLOGY

## 2025-04-17 PROCEDURE — 770002 HCHG ROOM/CARE - OB PRIVATE (112)

## 2025-04-17 PROCEDURE — 700105 HCHG RX REV CODE 258: Performed by: OBSTETRICS & GYNECOLOGY

## 2025-04-17 PROCEDURE — 86780 TREPONEMA PALLIDUM: CPT

## 2025-04-17 PROCEDURE — 700101 HCHG RX REV CODE 250: Performed by: OBSTETRICS & GYNECOLOGY

## 2025-04-17 PROCEDURE — 85025 COMPLETE CBC W/AUTO DIFF WBC: CPT

## 2025-04-17 PROCEDURE — 81002 URINALYSIS NONAUTO W/O SCOPE: CPT

## 2025-04-17 RX ORDER — LIDOCAINE HYDROCHLORIDE 10 MG/ML
20 INJECTION, SOLUTION INFILTRATION; PERINEURAL
Status: DISCONTINUED | OUTPATIENT
Start: 2025-04-17 | End: 2025-04-18 | Stop reason: HOSPADM

## 2025-04-17 RX ORDER — IBUPROFEN 800 MG/1
800 TABLET, FILM COATED ORAL
Status: DISCONTINUED | OUTPATIENT
Start: 2025-04-17 | End: 2025-04-18 | Stop reason: HOSPADM

## 2025-04-17 RX ORDER — SODIUM CHLORIDE, SODIUM LACTATE, POTASSIUM CHLORIDE, CALCIUM CHLORIDE 600; 310; 30; 20 MG/100ML; MG/100ML; MG/100ML; MG/100ML
INJECTION, SOLUTION INTRAVENOUS CONTINUOUS
Status: DISCONTINUED | OUTPATIENT
Start: 2025-04-17 | End: 2025-04-18 | Stop reason: HOSPADM

## 2025-04-17 RX ORDER — OXYTOCIN 10 [USP'U]/ML
10 INJECTION, SOLUTION INTRAMUSCULAR; INTRAVENOUS
Status: DISCONTINUED | OUTPATIENT
Start: 2025-04-17 | End: 2025-04-18 | Stop reason: HOSPADM

## 2025-04-17 RX ORDER — TERBUTALINE SULFATE 1 MG/ML
0.25 INJECTION SUBCUTANEOUS
Status: DISCONTINUED | OUTPATIENT
Start: 2025-04-17 | End: 2025-04-18 | Stop reason: HOSPADM

## 2025-04-17 RX ORDER — BETAMETHASONE SODIUM PHOSPHATE AND BETAMETHASONE ACETATE 3; 3 MG/ML; MG/ML
12 INJECTION, SUSPENSION INTRA-ARTICULAR; INTRALESIONAL; INTRAMUSCULAR; SOFT TISSUE EVERY 24 HOURS
Status: DISCONTINUED | OUTPATIENT
Start: 2025-04-17 | End: 2025-04-18 | Stop reason: HOSPADM

## 2025-04-17 RX ORDER — ACETAMINOPHEN 500 MG
1000 TABLET ORAL
Status: DISCONTINUED | OUTPATIENT
Start: 2025-04-17 | End: 2025-04-18 | Stop reason: HOSPADM

## 2025-04-17 RX ADMIN — SODIUM CHLORIDE 5 MILLION UNITS: 900 INJECTION INTRAVENOUS at 16:42

## 2025-04-17 RX ADMIN — BETAMETHASONE SODIUM PHOSPHATE AND BETAMETHASONE ACETATE 12 MG: 3; 3 INJECTION, SUSPENSION INTRA-ARTICULAR; INTRALESIONAL; INTRAMUSCULAR at 15:13

## 2025-04-17 RX ADMIN — WATER 2.5 MILLION UNITS: 1 INJECTION INTRAMUSCULAR; INTRAVENOUS; SUBCUTANEOUS at 21:14

## 2025-04-17 RX ADMIN — SODIUM CHLORIDE, POTASSIUM CHLORIDE, SODIUM LACTATE AND CALCIUM CHLORIDE: 600; 310; 30; 20 INJECTION, SOLUTION INTRAVENOUS at 15:13

## 2025-04-17 ASSESSMENT — LIFESTYLE VARIABLES
TOTAL SCORE: 0
CONSUMPTION TOTAL: NEGATIVE
EVER FELT BAD OR GUILTY ABOUT YOUR DRINKING: NO
EVER HAD A DRINK FIRST THING IN THE MORNING TO STEADY YOUR NERVES TO GET RID OF A HANGOVER: NO
HAVE PEOPLE ANNOYED YOU BY CRITICIZING YOUR DRINKING: NO
AVERAGE NUMBER OF DAYS PER WEEK YOU HAVE A DRINK CONTAINING ALCOHOL: 0
HAVE YOU EVER FELT YOU SHOULD CUT DOWN ON YOUR DRINKING: NO
TOTAL SCORE: 0
ALCOHOL_USE: NO
DOES PATIENT WANT TO STOP DRINKING: NO
HOW MANY TIMES IN THE PAST YEAR HAVE YOU HAD 5 OR MORE DRINKS IN A DAY: 0
ON A TYPICAL DAY WHEN YOU DRINK ALCOHOL HOW MANY DRINKS DO YOU HAVE: 0
TOTAL SCORE: 0

## 2025-04-17 ASSESSMENT — PAIN DESCRIPTION - PAIN TYPE: TYPE: ACUTE PAIN

## 2025-04-17 ASSESSMENT — SOCIAL DETERMINANTS OF HEALTH (SDOH)
WITHIN THE LAST YEAR, HAVE YOU BEEN AFRAID OF YOUR PARTNER OR EX-PARTNER?: NO
WITHIN THE PAST 12 MONTHS, YOU WORRIED THAT YOUR FOOD WOULD RUN OUT BEFORE YOU GOT THE MONEY TO BUY MORE: NEVER TRUE
WITHIN THE LAST YEAR, HAVE TO BEEN RAPED OR FORCED TO HAVE ANY KIND OF SEXUAL ACTIVITY BY YOUR PARTNER OR EX-PARTNER?: NO
WITHIN THE LAST YEAR, HAVE YOU BEEN HUMILIATED OR EMOTIONALLY ABUSED IN OTHER WAYS BY YOUR PARTNER OR EX-PARTNER?: NO
WITHIN THE PAST 12 MONTHS, THE FOOD YOU BOUGHT JUST DIDN'T LAST AND YOU DIDN'T HAVE MONEY TO GET MORE: NEVER TRUE
WITHIN THE LAST YEAR, HAVE YOU BEEN KICKED, HIT, SLAPPED, OR OTHERWISE PHYSICALLY HURT BY YOUR PARTNER OR EX-PARTNER?: NO
IN THE PAST 12 MONTHS, HAS THE ELECTRIC, GAS, OIL, OR WATER COMPANY THREATENED TO SHUT OFF SERVICE IN YOUR HOME?: NO

## 2025-04-17 ASSESSMENT — FIBROSIS 4 INDEX: FIB4 SCORE: 0.34

## 2025-04-17 NOTE — PROGRESS NOTES
1415- Received report from PETRA Barnes. Pt tx to room 231.    1546- Received orders from Dr. Alvarado to start pen g; refer to MAR.    1900- Report given to PETRA Phillips.

## 2025-04-17 NOTE — H&P
History and Physical      Barb Valdez is a 29 y.o. female  at 34w2d who presents for contractions that started earlier today. Patient was seen in the office and found to be janna q 2 minutes. Cervix was checked in the office and she was 170/-1.  She was told to come to labor and delivery for  labor workup.  Patient states that she does feel her contractions.  She states that they are mild and they feel like lower pelvic cramping.  She denies any loss of fluid or vaginal bleeding.  Reports normal good fetal movement.    ROS: Pertinent items are noted in HPI.    Past Medical History:   Diagnosis Date    Anxiety     Depression     History of migraine with aura     resolved in high school    IUD (intrauterine device) in place      No past surgical history on file.  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   Other Topics Concern    Not on file   Social History Narrative    Not on file     Social Drivers of Health     Financial Resource Strain: Low Risk  (2024)    Overall Financial Resource Strain (CARDIA)     Difficulty of Paying Living Expenses: Not hard at all    Food Insecurity: No Food Insecurity (2/6/2024)    Hunger Vital Sign     Worried About Running Out of Food in the Last Year: Never true     Ran Out of Food in the Last Year: Never true   Transportation Needs: No Transportation Needs (2/6/2024)    PRAPARE - Transportation     Lack of Transportation (Medical): No     Lack of Transportation (Non-Medical): No   Physical Activity: Insufficiently Active (2/6/2024)    Exercise Vital Sign     Days of Exercise per Week: 2 days     Minutes of Exercise per Session: 60 min   Stress: Stress Concern Present (2/6/2024)    [place] [employer] [employer] - Occupational Stress Questionnaire     Feeling of Stress : To some extent   Social Connections: Moderately Isolated (2/6/2024)    Social Connection and Isolation Panel [NHANES]     Frequency of Communication with Friends and Family: More than three times a week     Frequency of Social Gatherings with Friends and Family: Once a week     Attends Tenriism Services: Never     Active Member of Clubs or Organizations: No     Attends Club or Organization Meetings: Never     Marital Status:    Intimate Partner Violence: Not on file   Housing Stability: Low Risk  (2/6/2024)    Housing Stability Vital Sign     Unable to Pay for Housing in the Last Year: No     Number of Places Lived in the Last Year: 1     Unstable Housing in the Last Year: No     Allergies: Patient has no known allergies.    Current Facility-Administered Medications:     LR infusion, , Intravenous, Continuous, Michelle Alvarado D.O., Last Rate: 50 mL/hr at 04/17/25 1513, New Bag at 04/17/25 1513    lidocaine (XYLOCAINE) 1%  injection, 20 mL, Subcutaneous, Once PRN, Michelle Alvarado D.O.    terbutaline (Brethine) injection 0.25 mg, 0.25 mg, Subcutaneous, Once PRN, Michelle Alvarado D.O.    oxytocin (Pitocin) infusion bolus (for post delivery), 20 Units, Intravenous, Once, Held at 04/17/25 1430 **FOLLOWED BY** oxytocin (Pitocin) infusion (for post  "delivery), 125 mL/hr, Intravenous, Continuous, Michelle Alvarado D.O., Held at 25 1515    oxytocin (Pitocin) injection 10 Units, 10 Units, Intramuscular, Once PRN, Michelle Alvarado D.O.    ibuprofen (Motrin) tablet 800 mg, 800 mg, Oral, Once PRN, Michelle Alvarado D.O.    acetaminophen (Tylenol) tablet 1,000 mg, 1,000 mg, Oral, Once PRN, Michelle Alvarado D.O.    fentaNYL (Sublimaze) injection 50 mcg, 50 mcg, Intravenous, Q HOUR PRN **OR** fentaNYL (Sublimaze) injection 100 mcg, 100 mcg, Intravenous, Q HOUR PRN, Michelle Alvarado D.O.    betamethasone acetate-betamethasone sodium phosphate (Celestone) injection 12 mg, 12 mg, Intramuscular, Q24HR, Michelle Alvarado D.O., 12 mg at 25 1513    Prenatal care with Dr. Denny with following problems:  Patient Active Problem List    Diagnosis Date Noted     uterine contractions 2025    PHYLLIS (generalized anxiety disorder) 2024    Mild episode of recurrent major depressive disorder (HCC) 2024    Prolonged Q-T interval on ECG 2024    Palpitations 2024     *GBSuria*: Tx'd, [X] MELVIN usual urogenital kelli [X] 3rd tri UCx: <10K cfu/mL GBS, no tx rec at that level.  Glucose: 171 abn, [X] 3h wnl CBC wnl  Anatomy @ [facility]: S=Ds, posterior placenta, poor visualization of spine & CI [X] F/U U/S ordered 3/14  History of  delivery: 36wk Discuss vaginal progesterone  Genetics: [X] cffDNA low risk, [X] Core4 carrier screen negative  Rh positive  Immunity: RubImm, VarImm [X] flu, [X] Tdap  Delivery: [facility]. Peds: [facility] ( pediatrics)          Objective:      /62   Pulse (!) 105   Temp 36.1 °C (97 °F) (Temporal)   Resp 18   Ht 1.626 m (5' 4\")   Wt 62.8 kg (138 lb 6.4 oz)   SpO2 99%     General:   no acute distress, alert, cooperative   Skin:   normal   HEENT:  PERRLA and EOMI   Lungs:   Normal effort   Heart:   No LE edema   Abdomen:   gravid, NT   EFW:  2600 grams   Pelvis:  adequate with gynecoid pelvis   FHT:  Category I " BPM, 140   Uterine Size: S=D   Presentations: Cephalic   Cervix:     Dilation: 1cm    Effacement: 75%    Station:  -2    Consistency: Soft    Position: Middle       NST:  Baseline 130 with moderate variability, accels present, no decels noted. Reactive NST. Indication:  contractions in the third trimester    Lab Review  Lab:   Blood type: A     Recent Results (from the past 35 weeks)   THINPREP RFLX HPV ASCUS W/CTNG    Collection Time: 24  8:45 AM   Result Value Ref Range    DIAGNOSIS: SEE BELOW     Specimen adequacy: SEE BELOW     Performed by: SEE BELOW     Comment Comment     Note: SEE BELOW     Test Methodology: SEE BELOW     Chlamydia, Nuc. Acid Amp Negative Negative    Gonococcus, Nuc. Acid Amp Negative Negative    Comment SEE BELOW    PRENATAL PANEL 3 +HIV+HCV+CULTURE    Collection Time: 11/15/24  1:25 PM   Result Value Ref Range    WBC 8.5 4.8 - 10.8 K/uL    RBC 4.77 4.20 - 5.40 M/uL    Hemoglobin 13.8 12.0 - 16.0 g/dL    Hematocrit 43.3 37.0 - 47.0 %    MCV 90.8 81.4 - 97.8 fL    MCH 28.9 27.0 - 33.0 pg    MCHC 31.9 (L) 32.2 - 35.5 g/dL    RDW 40.1 35.9 - 50.0 fL    Platelet Count 320 164 - 446 K/uL    MPV 10.2 9.0 - 12.9 fL    Neutrophils-Polys 61.80 44.00 - 72.00 %    Lymphocytes 27.10 22.00 - 41.00 %    Monocytes 6.90 0.00 - 13.40 %    Eosinophils 3.30 0.00 - 6.90 %    Basophils 0.50 0.00 - 1.80 %    Immature Granulocytes 0.40 0.00 - 0.90 %    Nucleated RBC 0.00 0.00 - 0.20 /100 WBC    Neutrophils (Absolute) 5.25 1.82 - 7.42 K/uL    Lymphs (Absolute) 2.30 1.00 - 4.80 K/uL    Monos (Absolute) 0.59 0.00 - 0.85 K/uL    Eos (Absolute) 0.28 0.00 - 0.51 K/uL    Baso (Absolute) 0.04 0.00 - 0.12 K/uL    Immature Granulocytes (abs) 0.03 0.00 - 0.11 K/uL    NRBC (Absolute) 0.00 K/uL    Rubella IgG Antibody 14.20 IU/mL    Hepatitis B Surface Antigen Non-Reactive Non-Reactive    Syphilis, Treponemal Qual Non-Reactive Non-Reactive   HIV AG/AB COMBO ASSAY SCREENING    Collection Time: 11/15/24  1:25 PM    Result Value Ref Range    HIV Ag/Ab Combo Assay Non-Reactive Non Reactive   HEP C VIRUS ANTIBODY    Collection Time: 11/15/24  1:25 PM   Result Value Ref Range    Hepatitis C Antibody Non-Reactive Non-Reactive   URINE CULTURE(NEW)    Collection Time: 11/15/24  1:25 PM    Specimen: Urine   Result Value Ref Range    Significant Indicator POS (POS)     Source UR     Site -     Culture Result Usual urogenital kelli >100,000 cfu/mL (A)     Culture Result (A)      Streptococcus agalactiae (Group B)  ,000 cfu/mL     OP Prenatal Panel-Blood Bank    Collection Time: 11/15/24  1:25 PM   Result Value Ref Range    ABO Grouping Only A     Rh Grouping Only POS     Antibody Screen Scrn NEG    URINE CULTURE(NEW)    Collection Time: 01/10/25  8:08 AM    Specimen: Urine   Result Value Ref Range    Significant Indicator NEG     Source UR     Site -     Culture Result Usual urogenital kelli >100,000 cfu/mL    CBC WITH DIFFERENTIAL    Collection Time: 02/28/25  8:45 AM   Result Value Ref Range    WBC 9.0 4.8 - 10.8 K/uL    RBC 4.15 (L) 4.20 - 5.40 M/uL    Hemoglobin 13.8 12.0 - 16.0 g/dL    Hematocrit 39.0 37.0 - 47.0 %    MCV 94.0 81.4 - 97.8 fL    MCH 33.3 (H) 27.0 - 33.0 pg    MCHC 35.4 32.2 - 35.5 g/dL    RDW 45.1 35.9 - 50.0 fL    Platelet Count 269 164 - 446 K/uL    MPV 10.0 9.0 - 12.9 fL    Neutrophils-Polys 67.10 44.00 - 72.00 %    Lymphocytes 21.00 (L) 22.00 - 41.00 %    Monocytes 6.30 0.00 - 13.40 %    Eosinophils 3.20 0.00 - 6.90 %    Basophils 0.60 0.00 - 1.80 %    Immature Granulocytes 1.80 (H) 0.00 - 0.90 %    Nucleated RBC 0.00 0.00 - 0.20 /100 WBC    Neutrophils (Absolute) 6.07 1.82 - 7.42 K/uL    Lymphs (Absolute) 1.90 1.00 - 4.80 K/uL    Monos (Absolute) 0.57 0.00 - 0.85 K/uL    Eos (Absolute) 0.29 0.00 - 0.51 K/uL    Baso (Absolute) 0.05 0.00 - 0.12 K/uL    Immature Granulocytes (abs) 0.16 (H) 0.00 - 0.11 K/uL    NRBC (Absolute) 0.00 K/uL   VITAMIN D,25 HYDROXY (DEFICIENCY)    Collection Time: 02/28/25  8:45  AM   Result Value Ref Range    25-Hydroxy   Vitamin D 25 40 30 - 100 ng/mL   GLUCOSE 1HR GESTATIONAL    Collection Time: 02/28/25  8:45 AM   Result Value Ref Range    Glucose, Post Dose 171 (H) 70 - 139 mg/dL   T.PALLIDUM AB JAN (SCREENING)    Collection Time: 02/28/25  8:45 AM   Result Value Ref Range    Syphilis, Treponemal Qual Non-Reactive Non-Reactive   VARICELLA ZOSTER ANTIBODIES (IGG + IGM)    Collection Time: 03/08/25  7:39 AM   Result Value Ref Range    Varicella Zoster IgG Ab 1.6 S/CO    Varicella Zoster IgM Ab 0.43 <=0.90 ISR   GLUCOSE 3 HR GESTATIONAL    Collection Time: 03/08/25  7:39 AM   Result Value Ref Range    Baseline Glucose 82 65 - 95 mg/dL    Glucose 1 Hour 164 65 - 180 mg/dL    Glucose 2 Hour 150 65 - 155 mg/dL    Glucose 3 Hour 136 65 - 140 mg/dL   URINE CULTURE(NEW)    Collection Time: 03/18/25 12:00 PM    Specimen: Urine   Result Value Ref Range    Significant Indicator POS (POS)     Source UR     Site -     Culture Result Usual urogenital kelli ,000 cfu/mL (A)     Culture Result (A)      Streptococcus agalactiae (Group B)  <10,000 cfu/mL     AMBIGUOUS DATE/TIME    Collection Time: 03/18/25 12:00 PM    Specimen: Urine   Result Value Ref Range    Significant Indicator .     Source UR     Site -     Ambiguous Date/Time       This specimen was received from your office without a  collection date and/or time. Collection date and/or time  defaulted to receipt date and/or time.     POCT CEPHEID GROUP A STREP - PCR    Collection Time: 04/15/25  8:20 AM   Result Value Ref Range    POC Group A Strep, PCR Not Detected Not Detected, Invalid   POCT CEPHEID COV-2, FLU A/B, RSV - PCR    Collection Time: 04/15/25  9:00 AM   Result Value Ref Range    SARS-CoV-2 by PCR Negative Negative, Invalid    Influenza virus A RNA Negative Negative, Invalid    Influenza virus B, PCR Negative Negative, Invalid    RSV, PCR Negative Negative, Invalid   POCT urinalysis device results    Collection Time: 04/17/25   9:59 AM   Result Value Ref Range    POC Color Yellow     POC Appearance Clear     POC Glucose Negative Negative mg/dL    POC Ketones Trace (A) Negative mg/dL    POC Specific Gravity 1.015 1.005 - 1.030    POC Blood Negative Negative    POC Urine PH 7.0 5.0 - 8.0    POC Protein Negative Negative mg/dL    Bilirubin Negative Negative    Urobilinogen, Urine 0.2 Negative    POC Nitrites Negative Negative    POC Leukocyte Esterase Small (A) Negative        Assessment:   Barb Sullivan José Miguel at 34w2d  Labor status: Early latent labor.  Obstetrical history significant for   Patient Active Problem List    Diagnosis Date Noted     uterine contractions 2025    PHYLLIS (generalized anxiety disorder) 2024    Mild episode of recurrent major depressive disorder (HCC) 2024    Prolonged Q-T interval on ECG 2024    Palpitations 2024   .      Plan:     Admit to L&D  GBSuria- will need PCN ppx if she enters active labor   contractions- no cervical change after some time but she reports strength of contractions intensifying and she has a hx of a 36 week precipitous delivery. Will keep for 24 hour obs and to give BMZ. If no change throughout the night, consider discharge tomorrow after second dose of BMZ.   Prolonged Q-T only when she was on prozac but will avoid Zofran in the setting of nausea/vomiting.           [facility]

## 2025-04-17 NOTE — ED PROVIDER NOTES
Barb Valdez is a 29 y.o. female  at 34w2d who presents for contractions that started earlier today. Patient was seen in the office and found to be janna q 2 minutes. Cervix was checked in the office and she was /-1.  She was told to come to labor and delivery for  labor workup.  Patient states that she does feel her contractions.  She states that they are mild and they feel like lower pelvic cramping.  She denies any loss of fluid or vaginal bleeding.  Reports normal good fetal movement.     ROS: Pertinent items are noted in HPI.     Past Medical History        Past Medical History:   Diagnosis Date    Anxiety      Depression      History of migraine with aura       resolved in high school    IUD (intrauterine device) in place           Past Surgical History   No past surgical history on file.                       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   Other Topics Concern    Not on file   Social History Narrative    Not on file      Social Drivers of Health           Financial Resource Strain: Low Risk   (2/6/2024)     Overall Financial Resource Strain (CARDIA)      Difficulty of Paying Living Expenses: Not hard at all   Food Insecurity: No Food Insecurity (2/6/2024)     Hunger Vital Sign      Worried About Running Out of Food in the Last Year: Never true      Ran Out of Food in the Last Year: Never true   Transportation Needs: No Transportation Needs (2/6/2024)     PRAPARE - Transportation      Lack of Transportation (Medical): No      Lack of Transportation (Non-Medical): No   Physical Activity: Insufficiently Active (2/6/2024)     Exercise Vital Sign      Days of Exercise per Week: 2 days      Minutes of Exercise per Session: 60 min   Stress: Stress Concern Present (2/6/2024)     [employer] [place] [employer] - Occupational Stress Questionnaire      Feeling of Stress : To some extent   Social Connections: Moderately Isolated (2/6/2024)     Social Connection and Isolation Panel [NHANES]      Frequency of Communication with Friends and Family: More than three times a week      Frequency of Social Gatherings with Friends and Family: Once a week      Attends Scientologist Services: Never      Active Member of Clubs or Organizations: No      Attends Club or Organization Meetings: Never      Marital Status:    Intimate Partner Violence: Not on file   Housing Stability: Low Risk  (2/6/2024)     Housing Stability Vital Sign      Unable to Pay for Housing in the Last Year: No      Number of Places Lived in the Last Year: 1      Unstable Housing in the Last Year: No         Allergies: Patient has no known allergies.    Current Medications      Current Facility-Administered Medications:     LR infusion, , Intravenous, Continuous, Michelle Alvarado D.O., Last Rate: 50 mL/hr at 04/17/25 1513, New Bag at 04/17/25 1513    lidocaine (XYLOCAINE) 1%  injection, 20 mL, Subcutaneous, Once PRN, Michelle Alvarado D.O.    terbutaline (Brethine) injection 0.25 mg, 0.25 mg, Subcutaneous, Once PRN, Michelle Alvarado D.O.     "oxytocin (Pitocin) infusion bolus (for post delivery), 20 Units, Intravenous, Once, Held at 25 1430 **FOLLOWED BY** oxytocin (Pitocin) infusion (for post delivery), 125 mL/hr, Intravenous, Continuous, BRAYDEN Edwards.JACKIE, Held at 25 1515    oxytocin (Pitocin) injection 10 Units, 10 Units, Intramuscular, Once PRN, Michelle Alvarado D.O.    ibuprofen (Motrin) tablet 800 mg, 800 mg, Oral, Once PRN, BRAYDEN Edwards.O.    acetaminophen (Tylenol) tablet 1,000 mg, 1,000 mg, Oral, Once PRN, Michelle Alvarado D.O.    fentaNYL (Sublimaze) injection 50 mcg, 50 mcg, Intravenous, Q HOUR PRN **OR** fentaNYL (Sublimaze) injection 100 mcg, 100 mcg, Intravenous, Q HOUR PRN, Michelle Alvarado D.O.    betamethasone acetate-betamethasone sodium phosphate (Celestone) injection 12 mg, 12 mg, Intramuscular, Q24HR, BRAYDEN Edwards.OJade, 12 mg at 25 1513        Prenatal care with Dr. Denny with following problems:       Patient Active Problem List     Diagnosis Date Noted     uterine contractions 2025    PHYLLIS (generalized anxiety disorder) 2024    Mild episode of recurrent major depressive disorder (HCC) 2024    Prolonged Q-T interval on ECG 2024    Palpitations 2024      *GBSuria*: Tx'd, [X] MELVIN usual urogenital kelli [X] 3rd tri UCx: <10K cfu/mL GBS, no tx rec at that level.  Glucose: 171 abn, [X] 3h wnl CBC wnl  Anatomy @ [facility]: S=Ds, posterior placenta, poor visualization of spine & CI [X] F/U U/S ordered 3/14  History of  delivery: 36wk Discuss vaginal progesterone  Genetics: [X] cffDNA low risk, [X] Core4 carrier screen negative  Rh positive  Immunity: RubImm, VarImm [X] flu, [X] Tdap  Delivery: [facility]. Peds: [facility] ([facility])              Objective:      /62   Pulse (!) 105   Temp 36.1 °C (97 °F) (Temporal)   Resp 18   Ht 1.626 m (5' 4\")   Wt 62.8 kg (138 lb 6.4 oz)   SpO2 99%             General:   no acute distress, alert, cooperative    Skin:   " normal    HEENT:  PERRLA and EOMI    Lungs:   Normal effort    Heart:   No LE edema    Abdomen:   gravid, NT    EFW:  2600 grams    Pelvis:  adequate with gynecoid pelvis    FHT:  Category I BPM, 140   Uterine Size: S=D   Presentations: Cephalic   Cervix:       Dilation: 1cm     Effacement: 75%     Station:  -2     Consistency: Soft     Position: Middle         NST:  Baseline 130 with moderate variability, accels present, no decels noted. Reactive NST. Indication:  contractions in the third trimester     Lab Review  Lab:   Blood type: A      Recent Results          Recent Results (from the past 35 weeks)   THINPREP RFLX HPV ASCUS W/CTNG     Collection Time: 24  8:45 AM   Result Value Ref Range     DIAGNOSIS: SEE BELOW       Specimen adequacy: SEE BELOW       Performed by: SEE BELOW       Comment Comment       Note: SEE BELOW       Test Methodology: SEE BELOW       Chlamydia, Nuc. Acid Amp Negative Negative     Gonococcus, Nuc. Acid Amp Negative Negative     Comment SEE BELOW     PRENATAL PANEL 3 +HIV+HCV+CULTURE     Collection Time: 11/15/24  1:25 PM   Result Value Ref Range     WBC 8.5 4.8 - 10.8 K/uL     RBC 4.77 4.20 - 5.40 M/uL     Hemoglobin 13.8 12.0 - 16.0 g/dL     Hematocrit 43.3 37.0 - 47.0 %     MCV 90.8 81.4 - 97.8 fL     MCH 28.9 27.0 - 33.0 pg     MCHC 31.9 (L) 32.2 - 35.5 g/dL     RDW 40.1 35.9 - 50.0 fL     Platelet Count 320 164 - 446 K/uL     MPV 10.2 9.0 - 12.9 fL     Neutrophils-Polys 61.80 44.00 - 72.00 %     Lymphocytes 27.10 22.00 - 41.00 %     Monocytes 6.90 0.00 - 13.40 %     Eosinophils 3.30 0.00 - 6.90 %     Basophils 0.50 0.00 - 1.80 %     Immature Granulocytes 0.40 0.00 - 0.90 %     Nucleated RBC 0.00 0.00 - 0.20 /100 WBC     Neutrophils (Absolute) 5.25 1.82 - 7.42 K/uL     Lymphs (Absolute) 2.30 1.00 - 4.80 K/uL     Monos (Absolute) 0.59 0.00 - 0.85 K/uL     Eos (Absolute) 0.28 0.00 - 0.51 K/uL     Baso (Absolute) 0.04 0.00 - 0.12 K/uL     Immature Granulocytes (abs) 0.03  0.00 - 0.11 K/uL     NRBC (Absolute) 0.00 K/uL     Rubella IgG Antibody 14.20 IU/mL     Hepatitis B Surface Antigen Non-Reactive Non-Reactive     Syphilis, Treponemal Qual Non-Reactive Non-Reactive   HIV AG/AB COMBO ASSAY SCREENING     Collection Time: 11/15/24  1:25 PM   Result Value Ref Range     HIV Ag/Ab Combo Assay Non-Reactive Non Reactive   HEP C VIRUS ANTIBODY     Collection Time: 11/15/24  1:25 PM   Result Value Ref Range     Hepatitis C Antibody Non-Reactive Non-Reactive   URINE CULTURE(NEW)     Collection Time: 11/15/24  1:25 PM     Specimen: Urine   Result Value Ref Range     Significant Indicator POS (POS)       Source UR       Site -       Culture Result Usual urogenital kelli >100,000 cfu/mL (A)       Culture Result (A)         Streptococcus agalactiae (Group B)  ,000 cfu/mL      OP Prenatal Panel-Blood Bank     Collection Time: 11/15/24  1:25 PM   Result Value Ref Range     ABO Grouping Only A       Rh Grouping Only POS       Antibody Screen Scrn NEG     URINE CULTURE(NEW)     Collection Time: 01/10/25  8:08 AM     Specimen: Urine   Result Value Ref Range     Significant Indicator NEG       Source UR       Site -       Culture Result Usual urogenital kelli >100,000 cfu/mL     CBC WITH DIFFERENTIAL     Collection Time: 02/28/25  8:45 AM   Result Value Ref Range     WBC 9.0 4.8 - 10.8 K/uL     RBC 4.15 (L) 4.20 - 5.40 M/uL     Hemoglobin 13.8 12.0 - 16.0 g/dL     Hematocrit 39.0 37.0 - 47.0 %     MCV 94.0 81.4 - 97.8 fL     MCH 33.3 (H) 27.0 - 33.0 pg     MCHC 35.4 32.2 - 35.5 g/dL     RDW 45.1 35.9 - 50.0 fL     Platelet Count 269 164 - 446 K/uL     MPV 10.0 9.0 - 12.9 fL     Neutrophils-Polys 67.10 44.00 - 72.00 %     Lymphocytes 21.00 (L) 22.00 - 41.00 %     Monocytes 6.30 0.00 - 13.40 %     Eosinophils 3.20 0.00 - 6.90 %     Basophils 0.60 0.00 - 1.80 %     Immature Granulocytes 1.80 (H) 0.00 - 0.90 %     Nucleated RBC 0.00 0.00 - 0.20 /100 WBC     Neutrophils (Absolute) 6.07 1.82 - 7.42 K/uL      Lymphs (Absolute) 1.90 1.00 - 4.80 K/uL     Monos (Absolute) 0.57 0.00 - 0.85 K/uL     Eos (Absolute) 0.29 0.00 - 0.51 K/uL     Baso (Absolute) 0.05 0.00 - 0.12 K/uL     Immature Granulocytes (abs) 0.16 (H) 0.00 - 0.11 K/uL     NRBC (Absolute) 0.00 K/uL   VITAMIN D,25 HYDROXY (DEFICIENCY)     Collection Time: 02/28/25  8:45 AM   Result Value Ref Range     25-Hydroxy   Vitamin D 25 40 30 - 100 ng/mL   GLUCOSE 1HR GESTATIONAL     Collection Time: 02/28/25  8:45 AM   Result Value Ref Range     Glucose, Post Dose 171 (H) 70 - 139 mg/dL   T.PALLIDUM AB JAN (SCREENING)     Collection Time: 02/28/25  8:45 AM   Result Value Ref Range     Syphilis, Treponemal Qual Non-Reactive Non-Reactive   VARICELLA ZOSTER ANTIBODIES (IGG + IGM)     Collection Time: 03/08/25  7:39 AM   Result Value Ref Range     Varicella Zoster IgG Ab 1.6 S/CO     Varicella Zoster IgM Ab 0.43 <=0.90 ISR   GLUCOSE 3 HR GESTATIONAL     Collection Time: 03/08/25  7:39 AM   Result Value Ref Range     Baseline Glucose 82 65 - 95 mg/dL     Glucose 1 Hour 164 65 - 180 mg/dL     Glucose 2 Hour 150 65 - 155 mg/dL     Glucose 3 Hour 136 65 - 140 mg/dL   URINE CULTURE(NEW)     Collection Time: 03/18/25 12:00 PM     Specimen: Urine   Result Value Ref Range     Significant Indicator POS (POS)       Source UR       Site -       Culture Result Usual urogenital kelli ,000 cfu/mL (A)       Culture Result (A)         Streptococcus agalactiae (Group B)  <10,000 cfu/mL      AMBIGUOUS DATE/TIME     Collection Time: 03/18/25 12:00 PM     Specimen: Urine   Result Value Ref Range     Significant Indicator .       Source UR       Site -       Ambiguous Date/Time           This specimen was received from your office without a  collection date and/or time. Collection date and/or time  defaulted to receipt date and/or time.      POCT CEPHEID GROUP A STREP - PCR     Collection Time: 04/15/25  8:20 AM   Result Value Ref Range     POC Group A Strep, PCR Not Detected Not  Detected, Invalid   POCT CEPHEID COV-2, FLU A/B, RSV - PCR     Collection Time: 04/15/25  9:00 AM   Result Value Ref Range     SARS-CoV-2 by PCR Negative Negative, Invalid     Influenza virus A RNA Negative Negative, Invalid     Influenza virus B, PCR Negative Negative, Invalid     RSV, PCR Negative Negative, Invalid   POCT urinalysis device results     Collection Time: 25  9:59 AM   Result Value Ref Range     POC Color Yellow       POC Appearance Clear       POC Glucose Negative Negative mg/dL     POC Ketones Trace (A) Negative mg/dL     POC Specific Gravity 1.015 1.005 - 1.030     POC Blood Negative Negative     POC Urine PH 7.0 5.0 - 8.0     POC Protein Negative Negative mg/dL     Bilirubin Negative Negative     Urobilinogen, Urine 0.2 Negative     POC Nitrites Negative Negative     POC Leukocyte Esterase Small (A) Negative            Assessment:   Barb Valdez at 34w2d  Labor status: Early latent labor.  Obstetrical history significant for        Patient Active Problem List     Diagnosis Date Noted     uterine contractions 2025    PHYLLIS (generalized anxiety disorder) 2024    Mild episode of recurrent major depressive disorder (HCC) 2024    Prolonged Q-T interval on ECG 2024    Palpitations 2024   .      Plan:      Admit to L&D  GBSuria- will need PCN ppx if she enters active labor   contractions- no cervical change after some time but she reports strength of contractions intensifying and she has a hx of a 36 week precipitous delivery. Will keep for 24 hour obs and to give BMZ. If no change throughout the night, consider discharge tomorrow after second dose of BMZ.   Prolonged Q-T only when she was on prozac but will avoid Zofran in the setting of nausea/vomiting.              [facility]

## 2025-04-17 NOTE — PROGRESS NOTES
, due , 34w2d    0946 Pt presents to L&D after being sent from the office of Dr Denny after seeing contractions on the monitor at her routine prenatal appointment. Pt reports feeling tightening and mild cramping since last night. Pt denies any LOF or VB and reports +FM. EFM & TOCO applied. VSS.    1009 Call to Dr Alvarado, report given. Orders for SVE and extended monitoring. SVE as charted. Dr Alvarado updated.     1339 Call to Dr Alvarado, report given. Orders for recheck. SVE as charted. Dr Alvarado updated. Will come to bedside to discuss POC.     1345 Dr Alvarado at bedside. Orders to admit pt for observation received.     1415 Report given to PETRA Mcdonough. POC discussed, care relinquished.

## 2025-04-18 VITALS
SYSTOLIC BLOOD PRESSURE: 102 MMHG | RESPIRATION RATE: 18 BRPM | HEIGHT: 64 IN | BODY MASS INDEX: 23.63 KG/M2 | OXYGEN SATURATION: 95 % | DIASTOLIC BLOOD PRESSURE: 67 MMHG | HEART RATE: 82 BPM | WEIGHT: 138.4 LBS | TEMPERATURE: 97.3 F

## 2025-04-18 PROCEDURE — 59025 FETAL NON-STRESS TEST: CPT

## 2025-04-18 PROCEDURE — 700101 HCHG RX REV CODE 250: Performed by: OBSTETRICS & GYNECOLOGY

## 2025-04-18 PROCEDURE — 700111 HCHG RX REV CODE 636 W/ 250 OVERRIDE (IP): Performed by: OBSTETRICS & GYNECOLOGY

## 2025-04-18 PROCEDURE — 700105 HCHG RX REV CODE 258: Performed by: OBSTETRICS & GYNECOLOGY

## 2025-04-18 RX ADMIN — WATER 2.5 MILLION UNITS: 1 INJECTION INTRAMUSCULAR; INTRAVENOUS; SUBCUTANEOUS at 00:52

## 2025-04-18 RX ADMIN — WATER 2.5 MILLION UNITS: 1 INJECTION INTRAMUSCULAR; INTRAVENOUS; SUBCUTANEOUS at 05:24

## 2025-04-18 ASSESSMENT — PATIENT HEALTH QUESTIONNAIRE - PHQ9
2. FEELING DOWN, DEPRESSED, IRRITABLE, OR HOPELESS: NOT AT ALL
SUM OF ALL RESPONSES TO PHQ9 QUESTIONS 1 AND 2: 0
1. LITTLE INTEREST OR PLEASURE IN DOING THINGS: NOT AT ALL

## 2025-04-18 NOTE — CARE PLAN
The patient is Stable - Low risk of patient condition declining or worsening    Shift Goals  Clinical Goals: monitor for PTL  Patient Goals: rest  Family Goals: support    Progress made toward(s) clinical / shift goals:    Problem: Knowledge Deficit - L&D  Goal: Patient and family/caregivers will demonstrate understanding of plan of care, disease process/condition, diagnostic tests and medications  Outcome: Progressing  POC discussed     Problem: Risk for Injury  Goal: Patient and fetus will be free of preventable injury/complications  Outcome: Progressing  EFM per orders

## 2025-04-18 NOTE — CARE PLAN
The patient is Watcher - Medium risk of patient condition declining or worsening    Shift Goals  Clinical Goals: monitor for s/s PTL, beta complete  Patient Goals: rest  Family Goals: support    Progress made toward(s) clinical / shift goals:    Problem: Knowledge Deficit - L&D  Goal: Patient and family/caregivers will demonstrate understanding of plan of care, disease process/condition, diagnostic tests and medications  Outcome: Progressing - pt actively participates in POC     Problem: Risk for Injury  Goal: Patient and fetus will be free of preventable injury/complications  Outcome: Progressing - pt VSS and continuous FHR tracing category 1

## 2025-04-18 NOTE — PROGRESS NOTES
0657 - Report received from Jacqueline LAMBERT, care assumed.     2136 - Update given to Working MD, plan to remove monitors x2, d/c antibiotics and complete NST prior to d/c this afternoon.     1520 - d/c instructions reviewed with pt, pt confirms understanding, acknowledges reasons to return and feels comfortable going home at this time, pt ambulated off unit in stable condition

## 2025-04-18 NOTE — PROGRESS NOTES
1900: Report received from Mary Ellen LAMBERT, care assumed.    1945: Pt reports ctx that have gotten better, denies LOF, bleeding, reports + FM.    0610: Report given to Alma LAMBERT, care relinquished.

## 2025-04-23 ENCOUNTER — OFFICE VISIT (OUTPATIENT)
Dept: MEDICAL GROUP | Facility: MEDICAL CENTER | Age: 30
End: 2025-04-23
Payer: COMMERCIAL

## 2025-04-23 ENCOUNTER — PHARMACY VISIT (OUTPATIENT)
Dept: PHARMACY | Facility: MEDICAL CENTER | Age: 30
End: 2025-04-23
Payer: COMMERCIAL

## 2025-04-23 VITALS
BODY MASS INDEX: 23.63 KG/M2 | DIASTOLIC BLOOD PRESSURE: 52 MMHG | HEIGHT: 64 IN | TEMPERATURE: 97.1 F | SYSTOLIC BLOOD PRESSURE: 96 MMHG | WEIGHT: 138.4 LBS | RESPIRATION RATE: 16 BRPM | OXYGEN SATURATION: 96 % | HEART RATE: 117 BPM

## 2025-04-23 DIAGNOSIS — J01.01 ACUTE RECURRENT MAXILLARY SINUSITIS: ICD-10-CM

## 2025-04-23 DIAGNOSIS — Z3A.35 35 WEEKS GESTATION OF PREGNANCY: ICD-10-CM

## 2025-04-23 PROCEDURE — 99213 OFFICE O/P EST LOW 20 MIN: CPT

## 2025-04-23 PROCEDURE — 3074F SYST BP LT 130 MM HG: CPT

## 2025-04-23 PROCEDURE — 3078F DIAST BP <80 MM HG: CPT

## 2025-04-23 PROCEDURE — RXMED WILLOW AMBULATORY MEDICATION CHARGE

## 2025-04-23 RX ORDER — CLINDAMYCIN HYDROCHLORIDE 300 MG/1
300 CAPSULE ORAL 3 TIMES DAILY
Qty: 15 CAPSULE | Refills: 0 | Status: SHIPPED | OUTPATIENT
Start: 2025-04-23 | End: 2025-04-28

## 2025-04-23 ASSESSMENT — FIBROSIS 4 INDEX: FIB4 SCORE: 0.45

## 2025-04-23 NOTE — PROGRESS NOTES
Chief Complaint   Patient presents with    Follow-Up     Pt states she has been having a lot of right sided nasal pain and teeth pain. Pt states she has been having a constant nasal drip since Monday.        History of Present Illness  The patient presents for evaluation of acute sinusitis.    Acute Sinusitis  She reports facial pain localized to the right maxillary sinuses, which has been present since the previous day. This is the third episode of sinusitis during her current pregnancy, with prior episodes occurring in November 2024 and December 2024. The nasal discharge is described as thin and varies in color from clear to yellow. Additional symptoms include nasal congestion, rhinorrhea, and cephalalgia, which she attributes to her pregnancy. She denies sore throat, emesis, or diarrhea this week. However, she experienced a sore throat last week and has had mild nausea this week, which she also believes is related to her pregnancy. It is noted that her  and daughter have been ill recently.  - Onset: Since the previous day.  - Location: Right maxillary sinuses.  - Duration: Third episode during current pregnancy.  - Character: Facial pain, nasal discharge (thin, clear to yellow), nasal congestion, rhinorrhea, cephalalgia.  - Alleviating/Aggravating Factors: None specified.  - Severity: Not specified.    Pregnancy-Related Symptoms  The patient is currently 35 weeks pregnant and has experienced contractions. She has received corticosteroid injections to promote fetal lung maturation. She has been taking diphenhydramine nightly for the past week to aid with sleep.  - Onset: Not specified.  - Duration: Currently 35 weeks pregnant.  - Character: Contractions, mild nausea.  - Alleviating/Aggravating Factors: Corticosteroid injections, diphenhydramine for sleep.  - Severity: Not specified.       She  reports that she has never smoked. She has never used smokeless tobacco..     ROS:  No fever, cough, nausea,  "changes in bowel movements or skin rash.      I reviewed the patient's medications, allergies and medical history:  Current Outpatient Medications   Medication Sig Dispense Refill    clindamycin (CLEOCIN) 300 MG Cap Take 1 Capsule by mouth 3 times a day for 5 days. 15 Capsule 0    ondansetron (ZOFRAN ODT) 4 MG TABLET DISPERSIBLE Take 1 Tablet by mouth every 6 hours as needed for Nausea/Vomiting for up to 15 doses. 15 Tablet 0    Progesterone 200 MG Suppos Insert  into the vagina.      LEXAPRO 10 MG Tab Take 10 mg by mouth every day.      Prenatal MV-Min-Fe Fum-FA-DHA (PRENATAL 1 PO) Take  by mouth.       No current facility-administered medications for this visit.     [employer] [employer] [place]  Past Medical History:   Diagnosis Date    Anxiety     Depression     History of migraine with aura     resolved in high school    IUD (intrauterine device) in place         EXAM:  BP 96/52   Pulse (!) 117   Temp 36.2 °C (97.1 °F) (Temporal)   Resp 16   Ht 1.626 m (5' 4\")   Wt 62.8 kg (138 lb 6.4 oz)   SpO2 96%   General: Alert, no conversational dyspnea or audible wheeze, non-toxic appearance.  Eyes: PERRL, conjunctiva slightly injected, no eye discharge.  Ears: Normal pinnae,TM's normal bilaterally.  Nares: Patent with thin mucus.  Sinuses: tender over maxillary / frontal sinuses.  Throat: Erythematous injection without exudate.   Neck: Supple, with no adenopathy.  Lungs: Clear to auscultation bilaterally, no wheeze, crackles or rhonchi.   Heart: Regular rate without murmur.  Skin: Warm and dry without rash.     ASSESSMENT:   1. Acute recurrent maxillary sinusitis    2. 35 weeks gestation of pregnancy            PLAN:  1. Educated patient that majority of upper respiratory infections are viral and do not need antibiotics. As symptoms have been worsening over the last week, will treat with antibiotics.  2. Twice daily use of nasal saline rinse or Neti-Pot.  3. OTC anti-pyretics and decongestants as needed.  4. Follow-up in " office or urgent care for worsening symptoms, difficulty breathing, lack of expected recovery, or should new symptoms or problems arise.

## 2025-05-02 ENCOUNTER — HOSPITAL ENCOUNTER (EMERGENCY)
Facility: MEDICAL CENTER | Age: 30
End: 2025-05-02
Attending: OBSTETRICS & GYNECOLOGY | Admitting: OBSTETRICS & GYNECOLOGY
Payer: COMMERCIAL

## 2025-05-02 VITALS
DIASTOLIC BLOOD PRESSURE: 79 MMHG | RESPIRATION RATE: 18 BRPM | SYSTOLIC BLOOD PRESSURE: 126 MMHG | WEIGHT: 140 LBS | OXYGEN SATURATION: 100 % | HEIGHT: 64 IN | HEART RATE: 96 BPM | BODY MASS INDEX: 23.9 KG/M2 | TEMPERATURE: 96.6 F

## 2025-05-02 LAB
APPEARANCE UR: CLEAR
BILIRUB UR QL STRIP.AUTO: NEGATIVE
COLOR UR AUTO: YELLOW
GLUCOSE UR QL STRIP.AUTO: NEGATIVE MG/DL
KETONES UR QL STRIP.AUTO: NEGATIVE MG/DL
LEUKOCYTE ESTERASE UR QL STRIP.AUTO: ABNORMAL
NITRITE UR QL STRIP.AUTO: NEGATIVE
PH UR STRIP.AUTO: 7 [PH] (ref 5–8)
PROT UR QL STRIP: NEGATIVE MG/DL
RBC UR QL AUTO: NEGATIVE
SP GR UR STRIP.AUTO: 1.02 (ref 1–1.03)
UROBILINOGEN UR STRIP.AUTO-MCNC: 0.2 MG/DL

## 2025-05-02 PROCEDURE — 36415 COLL VENOUS BLD VENIPUNCTURE: CPT

## 2025-05-02 PROCEDURE — 81002 URINALYSIS NONAUTO W/O SCOPE: CPT

## 2025-05-02 PROCEDURE — 99283 EMERGENCY DEPT VISIT LOW MDM: CPT

## 2025-05-02 PROCEDURE — 59025 FETAL NON-STRESS TEST: CPT

## 2025-05-02 PROCEDURE — 700105 HCHG RX REV CODE 258: Performed by: OBSTETRICS & GYNECOLOGY

## 2025-05-02 RX ORDER — SODIUM CHLORIDE, SODIUM LACTATE, POTASSIUM CHLORIDE, AND CALCIUM CHLORIDE .6; .31; .03; .02 G/100ML; G/100ML; G/100ML; G/100ML
1000 INJECTION, SOLUTION INTRAVENOUS ONCE
Status: COMPLETED | OUTPATIENT
Start: 2025-05-02 | End: 2025-05-02

## 2025-05-02 RX ADMIN — SODIUM CHLORIDE, POTASSIUM CHLORIDE, SODIUM LACTATE AND CALCIUM CHLORIDE 1000 ML: 600; 310; 30; 20 INJECTION, SOLUTION INTRAVENOUS at 18:35

## 2025-05-02 ASSESSMENT — FIBROSIS 4 INDEX: FIB4 SCORE: 0.45

## 2025-05-03 NOTE — ED PROVIDER NOTES
DATE OF ADMISSION:  2025     OB-GYN OBSERVATION HISTORY AND PHYSICAL     IDENTIFICATION:  This is a 29-year-old  2, para 0-1-0-1 with an EDC of   2025, an EGA of 36 and 3/7th weeks who presents with a chief complaint   of uterine contractions.     HISTORY OF PRESENT ILLNESS:  This is a patient of Dr. Denny's who has gotten   good prenatal care.  She has a history of  labor and delivery at 36   weeks.  She has been on vaginal progesterone with this pregnancy, ran out last   week, and stopped using her progesterone prior to 37 weeks.  She was seen in   the office yesterday and was found to be 2 cm.  She presents today complaining   of uterine contractions.  She denies spontaneous rupture of membranes or   vaginal bleeding.  Admits good fetal movement.  She denies symptoms of PIH.    Here in Labor and Delivery, fetal heart tracing is reactive, category 1.  She   is janna regularly, but not uncomfortable.  Her cervix is 2, 70 and -2,   unchanged from yesterday.  She has no other complaints and has followup with   Dr. Denny next week.     OBSTETRICAL HISTORY:  Significant for 36-week vaginal delivery, 6 pounds 4   ounces.     GYNECOLOGIC HISTORY:  Denies STDs, abnormal Pap.     MEDICAL HISTORY:  ALLERGIES:  None.     CURRENT MEDICATIONS:  Lexapro, prenatal vitamins.     MEDICAL PROBLEMS:  None.     SURGICAL HISTORY:  None.     SOCIAL HISTORY:  Denies alcohol, tobacco, or drug abuse.     FAMILY HISTORY:  Noncontributory.     REVIEW OF SYSTEMS:  Times 12 is negative per AMA standards available in chart.     LABORATORY DATA:  None pending.     PHYSICAL EXAMINATION:  VITAL SIGNS:  The patient is afebrile.  Vital signs within normal limits.    Fetal heart tracing is reactive, category 1.  She is janna irregularly.  GENERAL:  She is awake, alert, and in no apparent distress.  NECK:  Supple.  HEART:  Regular.  CHEST:  Clear.  BREASTS:  Symmetrical.  ABDOMEN:  Soft, gravid, size appropriate  for dates.  EXTREMITIES:  Negative.  GYNECOLOGIC:  As above.     ASSESSMENT:  At this time,  1.  Pregnancy at 36 and 3/7th weeks.  2.   uterine contractions without evidence of labor.  3.  Prior history of  labor and delivery.  4.  Fetal status reassuring.     PLAN:  At this time, I have discussed with the patient that her cervix is   unchanged from yesterday.  I have offered her IV hydration, which she has   accepted.  She was admitted previously and did receive betamethasone already.   I have discussed with the patient that we would not take her to labor as she   was not in active labor, but we will send her home as her cervix has not   changed after hydration. She is comfortable with this and will follow up with   Dr. Denny next week with kick counts,  labor precautions, PPROM   precautions given.        ______________________________  MD GEOFF Hartman/KENNEY    DD:  2025 18:26  DT:  2025 20:47    Job#:  [id number]

## 2025-05-03 NOTE — PROGRESS NOTES
1900 - Report received, care assumed.     1920 - RN to bedside. Patient reports less frequent contractions, POC discussed. Plan to D/C in stable condition.     1930 - Order received for discharge home with follow up at next scheduled appointment. Patient/FOB deny questions regarding care since arrival to [facility] or POC for discharge home. Patient discharged home with specific instruction to return to L&D/Physician ie.. Bleeding/ROM/decreased FM/labor/concerns for self or baby

## 2025-05-03 NOTE — PROGRESS NOTES
1739_ Pt is  with a MAYRA of 2025 making her 36 weeks and 3 days. Pt presents to L&D c/o UC's since this am.  Pt reports +FM and denies any LOF or VB. Pt states hx of precipitous delivery @ 36 weeks with her last pregnancy.    _ Report to Dr Martinez    _ Dr Martinez @ bedside. SVE . Orders received for 1 liter of IV LR. Ok to d/c after if pt comfortable    _ Report to PETRA Daley

## 2025-05-22 NOTE — H&P
OBSTETRICS & GYNECOLOGY  Labor and Delivery History and Physical      Date of Admission: 2025      ID: 29 y.o.  with IUP at 39w3d on date of admission    Primary OB: Jean Denny M.D.    CC: full-term induction of labor     HPI: Barb Valdez is a 29 y.o.  at 39w3d on date of admission by LMP c/w 10 week ultrasound, who presents for full-term induction of labor.    Current pregnancy has been complicated her History of  delivery: 36wk in her first pregnancy.  At 34 wk, she was admitted with PTL at [facility] & received Betamethasone.  She is now full-term and desires IOL.     ROS: 10 systems reviewed and negative except as noted above.    Obstetric History   OB History    Para Term  AB Living   2 1 0 1 0 1   SAB IAB Ectopic Molar Multiple Live Births   0 0 0 0 0 1      # Outcome Date GA Lbr Yusuf/2nd Weight Sex Type Anes PTL Lv   2 Current            1  23 36w0d 01:30 / 00:29 2.84 kg (6 lb 4.2 oz) F Vag-Spont None Y FAM         Past Medical History  Surgical History   Past Medical History[1] Past Surgical History[2]      Gynecologic History  Social History   Regular menses prior to pregnancy  Denies Hx of abnormal pap smears.  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     Gen: NAD, AAO  Resp: unlabored  Abd: Gravid, NTTP, Cephalic by [place], No rebound or guarding  Ext: NTTP, no edema, 2+DPP  Pelvic: SVE 3/80/-2 at last check on 5/15/2025    Labs (ordered and reviewed by me):   Lab Results   Component Value Date/Time    WBC 8.3 2025 06:54 PM    RBC 4.30 2025 06:54 PM    HEMOGLOBIN 13.0 2025 06:54 PM    HEMATOCRIT  39.6 2025 06:54 PM    MCV 92.1 2025 06:54 PM    RDW 45.6 2025 06:54 PM    PLATELETCT 205 2025 06:54 PM       Prenatal labs:   Lab  Result    Rh  Positive    Antibody screen  Negative    Rubella  Immune    HIV  Non-Reactive    RPR  Non-Reactive    HBsAg  Non-Reactive    HCV Ab  Non-Reactive    Urine Culture  Usual urogenital kelli >100,000 cfu/mL.  Streptococcus agalactiae (Group B) ,000 cfu/mL     Gonorrhea/chlamydia/Trich  neg/neg/neg    Aneuploidy screening  cffDNA low risk    Carrier screening  Negative for 4 of 4 genes    1h Glucose  171 abn, 3h wnl    GBS  POSITIVE by GBSuria in pregnancy       A/P: Barb Valdez is a  at 39w2d by LMP c/w 10wk U/S who presents for full-term IOL.  Hx of  delivery in prior pregnancy, hx of arrested  labor and BMZ at 34wk in this pregnancy.   *Admit to L&D  *IV, CBC, T&S on hold  *Labor: Oxytocin/AROM IOL planned given favorable cervix.    *FWB: CEFM.  *Pain: planning epidural  *GBS POSITIVE: start PCN PPX on admission.   *Global: Rh+, RubImm.   - Clears        Jean Denny MD, MS, FACOG   2025, 12:28 PM     [facility]   [phone]           [1]   Past Medical History:  Diagnosis Date    Anxiety     Depression     History of migraine with aura     resolved in high school     delivery 2023    36w0d   [2] No past surgical history on file.  [3]   No current facility-administered medications on file prior to encounter.     Current Outpatient Medications on File Prior to Encounter   Medication Sig Dispense Refill    ondansetron (ZOFRAN ODT) 4 MG TABLET DISPERSIBLE Take 1 Tablet by mouth every 6 hours as needed for Nausea/Vomiting for up to 15 doses. 15 Tablet 0    Progesterone 200 MG Suppos Insert  into the vagina.      LEXAPRO 10 MG Tab Take 10 mg by mouth every day.      Prenatal MV-Min-Fe Fum-FA-DHA (PRENATAL 1 PO) Take  by mouth.     [4]   Allergies  Allergen Reactions    Pecan Rash     Pt  states she gets a rash when she eats pecans or walnuts.     [place] Rash     Pt states she gets a rash when she eats pecans or walnuts.

## 2025-05-23 ENCOUNTER — ANESTHESIA EVENT (OUTPATIENT)
Dept: ANESTHESIOLOGY | Facility: MEDICAL CENTER | Age: 30
End: 2025-05-23
Payer: COMMERCIAL

## 2025-05-23 ENCOUNTER — HOSPITAL ENCOUNTER (INPATIENT)
Facility: MEDICAL CENTER | Age: 30
LOS: 1 days | End: 2025-05-24
Attending: OBSTETRICS & GYNECOLOGY | Admitting: OBSTETRICS & GYNECOLOGY
Payer: COMMERCIAL

## 2025-05-23 ENCOUNTER — ANESTHESIA (OUTPATIENT)
Dept: ANESTHESIOLOGY | Facility: MEDICAL CENTER | Age: 30
End: 2025-05-23
Payer: COMMERCIAL

## 2025-05-23 ENCOUNTER — APPOINTMENT (OUTPATIENT)
Dept: OBGYN | Facility: MEDICAL CENTER | Age: 30
End: 2025-05-23
Attending: OBSTETRICS & GYNECOLOGY
Payer: COMMERCIAL

## 2025-05-23 LAB
ABO GROUP BLD: NORMAL
BASOPHILS # BLD AUTO: 0.6 % (ref 0–1.8)
BASOPHILS # BLD: 0.05 K/UL (ref 0–0.12)
BLD GP AB SCN SERPL QL: NORMAL
EOSINOPHIL # BLD AUTO: 0.28 K/UL (ref 0–0.51)
EOSINOPHIL NFR BLD: 3.1 % (ref 0–6.9)
ERYTHROCYTE [DISTWIDTH] IN BLOOD BY AUTOMATED COUNT: 44.6 FL (ref 35.9–50)
HCT VFR BLD AUTO: 42.6 % (ref 37–47)
HGB BLD-MCNC: 13.9 G/DL (ref 12–16)
IMM GRANULOCYTES # BLD AUTO: 0.24 K/UL (ref 0–0.11)
IMM GRANULOCYTES NFR BLD AUTO: 2.7 % (ref 0–0.9)
LYMPHOCYTES # BLD AUTO: 2.18 K/UL (ref 1–4.8)
LYMPHOCYTES NFR BLD: 24.3 % (ref 22–41)
MCH RBC QN AUTO: 30.1 PG (ref 27–33)
MCHC RBC AUTO-ENTMCNC: 32.6 G/DL (ref 32.2–35.5)
MCV RBC AUTO: 92.2 FL (ref 81.4–97.8)
MONOCYTES # BLD AUTO: 0.82 K/UL (ref 0–0.85)
MONOCYTES NFR BLD AUTO: 9.2 % (ref 0–13.4)
NEUTROPHILS # BLD AUTO: 5.39 K/UL (ref 1.82–7.42)
NEUTROPHILS NFR BLD: 60.1 % (ref 44–72)
NRBC # BLD AUTO: 0 K/UL
NRBC BLD-RTO: 0 /100 WBC (ref 0–0.2)
PLATELET # BLD AUTO: 196 K/UL (ref 164–446)
PMV BLD AUTO: 11 FL (ref 9–12.9)
RBC # BLD AUTO: 4.62 M/UL (ref 4.2–5.4)
RH BLD: NORMAL
T PALLIDUM AB SER QL IA: NORMAL
WBC # BLD AUTO: 9 K/UL (ref 4.8–10.8)

## 2025-05-23 PROCEDURE — 700101 HCHG RX REV CODE 250: Performed by: OBSTETRICS & GYNECOLOGY

## 2025-05-23 PROCEDURE — 36415 COLL VENOUS BLD VENIPUNCTURE: CPT

## 2025-05-23 PROCEDURE — 10907ZC DRAINAGE OF AMNIOTIC FLUID, THERAPEUTIC FROM PRODUCTS OF CONCEPTION, VIA NATURAL OR ARTIFICIAL OPENING: ICD-10-PCS | Performed by: OBSTETRICS & GYNECOLOGY

## 2025-05-23 PROCEDURE — 700111 HCHG RX REV CODE 636 W/ 250 OVERRIDE (IP): Mod: JZ | Performed by: OBSTETRICS & GYNECOLOGY

## 2025-05-23 PROCEDURE — 700102 HCHG RX REV CODE 250 W/ 637 OVERRIDE(OP): Performed by: OBSTETRICS & GYNECOLOGY

## 2025-05-23 PROCEDURE — 303615 HCHG EPIDURAL/SPINAL ANESTHESIA FOR LABOR

## 2025-05-23 PROCEDURE — 86901 BLOOD TYPING SEROLOGIC RH(D): CPT

## 2025-05-23 PROCEDURE — 0UQMXZZ REPAIR VULVA, EXTERNAL APPROACH: ICD-10-PCS | Performed by: OBSTETRICS & GYNECOLOGY

## 2025-05-23 PROCEDURE — 3E033VJ INTRODUCTION OF OTHER HORMONE INTO PERIPHERAL VEIN, PERCUTANEOUS APPROACH: ICD-10-PCS | Performed by: OBSTETRICS & GYNECOLOGY

## 2025-05-23 PROCEDURE — 700101 HCHG RX REV CODE 250: Performed by: ANESTHESIOLOGY

## 2025-05-23 PROCEDURE — 86780 TREPONEMA PALLIDUM: CPT

## 2025-05-23 PROCEDURE — A9270 NON-COVERED ITEM OR SERVICE: HCPCS | Performed by: OBSTETRICS & GYNECOLOGY

## 2025-05-23 PROCEDURE — 304965 HCHG RECOVERY SERVICES

## 2025-05-23 PROCEDURE — 59409 OBSTETRICAL CARE: CPT

## 2025-05-23 PROCEDURE — 0HQ9XZZ REPAIR PERINEUM SKIN, EXTERNAL APPROACH: ICD-10-PCS | Performed by: OBSTETRICS & GYNECOLOGY

## 2025-05-23 PROCEDURE — 770002 HCHG ROOM/CARE - OB PRIVATE (112)

## 2025-05-23 PROCEDURE — 700111 HCHG RX REV CODE 636 W/ 250 OVERRIDE (IP): Performed by: ANESTHESIOLOGY

## 2025-05-23 PROCEDURE — 85025 COMPLETE CBC W/AUTO DIFF WBC: CPT

## 2025-05-23 PROCEDURE — 700105 HCHG RX REV CODE 258: Performed by: ANESTHESIOLOGY

## 2025-05-23 PROCEDURE — 86900 BLOOD TYPING SEROLOGIC ABO: CPT

## 2025-05-23 PROCEDURE — 700105 HCHG RX REV CODE 258: Performed by: OBSTETRICS & GYNECOLOGY

## 2025-05-23 PROCEDURE — 86850 RBC ANTIBODY SCREEN: CPT

## 2025-05-23 RX ORDER — CARBOPROST TROMETHAMINE 250 UG/ML
250 INJECTION, SOLUTION INTRAMUSCULAR
Status: DISCONTINUED | OUTPATIENT
Start: 2025-05-23 | End: 2025-05-23 | Stop reason: HOSPADM

## 2025-05-23 RX ORDER — ESCITALOPRAM OXALATE 10 MG/1
10 TABLET ORAL DAILY
Status: DISCONTINUED | OUTPATIENT
Start: 2025-05-23 | End: 2025-05-23 | Stop reason: HOSPADM

## 2025-05-23 RX ORDER — METHYLERGONOVINE MALEATE 0.2 MG/ML
0.2 INJECTION INTRAVENOUS
Status: DISCONTINUED | OUTPATIENT
Start: 2025-05-23 | End: 2025-05-23 | Stop reason: HOSPADM

## 2025-05-23 RX ORDER — SODIUM CHLORIDE, SODIUM LACTATE, POTASSIUM CHLORIDE, AND CALCIUM CHLORIDE .6; .31; .03; .02 G/100ML; G/100ML; G/100ML; G/100ML
250 INJECTION, SOLUTION INTRAVENOUS PRN
Status: DISCONTINUED | OUTPATIENT
Start: 2025-05-23 | End: 2025-05-23 | Stop reason: HOSPADM

## 2025-05-23 RX ORDER — ROPIVACAINE HYDROCHLORIDE 2 MG/ML
INJECTION, SOLUTION EPIDURAL; INFILTRATION; PERINEURAL CONTINUOUS
Status: DISCONTINUED | OUTPATIENT
Start: 2025-05-23 | End: 2025-05-24 | Stop reason: HOSPADM

## 2025-05-23 RX ORDER — ACETAMINOPHEN 500 MG
1000 TABLET ORAL
Status: DISCONTINUED | OUTPATIENT
Start: 2025-05-23 | End: 2025-05-23 | Stop reason: HOSPADM

## 2025-05-23 RX ORDER — IBUPROFEN 800 MG/1
800 TABLET, FILM COATED ORAL
Status: DISCONTINUED | OUTPATIENT
Start: 2025-05-23 | End: 2025-05-23 | Stop reason: HOSPADM

## 2025-05-23 RX ORDER — LIDOCAINE HYDROCHLORIDE AND EPINEPHRINE 15; 5 MG/ML; UG/ML
INJECTION, SOLUTION EPIDURAL
Status: COMPLETED | OUTPATIENT
Start: 2025-05-23 | End: 2025-05-23

## 2025-05-23 RX ORDER — ROPIVACAINE HYDROCHLORIDE 2 MG/ML
INJECTION, SOLUTION EPIDURAL; INFILTRATION; PERINEURAL CONTINUOUS
OUTPATIENT
Start: 2025-05-23

## 2025-05-23 RX ORDER — MISOPROSTOL 200 UG/1
800 TABLET ORAL
Status: DISCONTINUED | OUTPATIENT
Start: 2025-05-23 | End: 2025-05-23 | Stop reason: HOSPADM

## 2025-05-23 RX ORDER — SIMETHICONE 125 MG
125 TABLET,CHEWABLE ORAL 4 TIMES DAILY PRN
Status: DISCONTINUED | OUTPATIENT
Start: 2025-05-23 | End: 2025-05-24 | Stop reason: HOSPADM

## 2025-05-23 RX ORDER — DOCUSATE SODIUM 100 MG/1
100 CAPSULE, LIQUID FILLED ORAL 2 TIMES DAILY PRN
Status: DISCONTINUED | OUTPATIENT
Start: 2025-05-23 | End: 2025-05-24 | Stop reason: HOSPADM

## 2025-05-23 RX ORDER — TERBUTALINE SULFATE 1 MG/ML
0.25 INJECTION SUBCUTANEOUS
Status: DISCONTINUED | OUTPATIENT
Start: 2025-05-23 | End: 2025-05-23 | Stop reason: HOSPADM

## 2025-05-23 RX ORDER — SODIUM CHLORIDE, SODIUM LACTATE, POTASSIUM CHLORIDE, AND CALCIUM CHLORIDE .6; .31; .03; .02 G/100ML; G/100ML; G/100ML; G/100ML
1000 INJECTION, SOLUTION INTRAVENOUS
Status: COMPLETED | OUTPATIENT
Start: 2025-05-23 | End: 2025-05-23

## 2025-05-23 RX ORDER — ACETAMINOPHEN 500 MG
1000 TABLET ORAL EVERY 6 HOURS PRN
Status: DISCONTINUED | OUTPATIENT
Start: 2025-05-23 | End: 2025-05-24 | Stop reason: HOSPADM

## 2025-05-23 RX ORDER — IBUPROFEN 800 MG/1
800 TABLET, FILM COATED ORAL EVERY 8 HOURS PRN
Status: DISCONTINUED | OUTPATIENT
Start: 2025-05-23 | End: 2025-05-24 | Stop reason: HOSPADM

## 2025-05-23 RX ORDER — SODIUM CHLORIDE, SODIUM LACTATE, POTASSIUM CHLORIDE, AND CALCIUM CHLORIDE .6; .31; .03; .02 G/100ML; G/100ML; G/100ML; G/100ML
250 INJECTION, SOLUTION INTRAVENOUS PRN
OUTPATIENT
Start: 2025-05-23

## 2025-05-23 RX ORDER — DOCUSATE SODIUM 100 MG/1
100 CAPSULE, LIQUID FILLED ORAL 2 TIMES DAILY
Status: DISCONTINUED | OUTPATIENT
Start: 2025-05-23 | End: 2025-05-24 | Stop reason: HOSPADM

## 2025-05-23 RX ORDER — SODIUM CHLORIDE, SODIUM LACTATE, POTASSIUM CHLORIDE, AND CALCIUM CHLORIDE .6; .31; .03; .02 G/100ML; G/100ML; G/100ML; G/100ML
1000 INJECTION, SOLUTION INTRAVENOUS
OUTPATIENT
Start: 2025-05-23

## 2025-05-23 RX ORDER — POLYETHYLENE GLYCOL 3350 17 G/17G
1 POWDER, FOR SOLUTION ORAL DAILY
Status: DISCONTINUED | OUTPATIENT
Start: 2025-05-24 | End: 2025-05-24 | Stop reason: HOSPADM

## 2025-05-23 RX ORDER — OXYTOCIN 10 [USP'U]/ML
10 INJECTION, SOLUTION INTRAMUSCULAR; INTRAVENOUS
Status: DISCONTINUED | OUTPATIENT
Start: 2025-05-23 | End: 2025-05-23 | Stop reason: HOSPADM

## 2025-05-23 RX ORDER — SODIUM CHLORIDE, SODIUM LACTATE, POTASSIUM CHLORIDE, CALCIUM CHLORIDE 600; 310; 30; 20 MG/100ML; MG/100ML; MG/100ML; MG/100ML
INJECTION, SOLUTION INTRAVENOUS CONTINUOUS
Status: DISCONTINUED | OUTPATIENT
Start: 2025-05-23 | End: 2025-05-24 | Stop reason: HOSPADM

## 2025-05-23 RX ORDER — MISOPROSTOL 200 UG/1
600 TABLET ORAL
Status: DISCONTINUED | OUTPATIENT
Start: 2025-05-23 | End: 2025-05-24 | Stop reason: HOSPADM

## 2025-05-23 RX ORDER — LIDOCAINE HYDROCHLORIDE 10 MG/ML
20 INJECTION, SOLUTION INFILTRATION; PERINEURAL
Status: COMPLETED | OUTPATIENT
Start: 2025-05-23 | End: 2025-05-23

## 2025-05-23 RX ORDER — METHYLERGONOVINE MALEATE 0.2 MG/ML
0.2 INJECTION INTRAVENOUS
Status: DISCONTINUED | OUTPATIENT
Start: 2025-05-23 | End: 2025-05-24 | Stop reason: HOSPADM

## 2025-05-23 RX ORDER — EPHEDRINE SULFATE 50 MG/ML
5 INJECTION, SOLUTION INTRAVENOUS
Status: DISCONTINUED | OUTPATIENT
Start: 2025-05-23 | End: 2025-05-23 | Stop reason: HOSPADM

## 2025-05-23 RX ORDER — VITAMIN A ACETATE, BETA CAROTENE, ASCORBIC ACID, CHOLECALCIFEROL, .ALPHA.-TOCOPHEROL ACETATE, DL-, THIAMINE MONONITRATE, RIBOFLAVIN, NIACINAMIDE, PYRIDOXINE HYDROCHLORIDE, FOLIC ACID, CYANOCOBALAMIN, CALCIUM CARBONATE, FERROUS FUMARATE, ZINC OXIDE, CUPRIC OXIDE 3080; 12; 120; 400; 1; 1.84; 3; 20; 22; 920; 25; 200; 27; 10; 2 [IU]/1; UG/1; MG/1; [IU]/1; MG/1; MG/1; MG/1; MG/1; MG/1; [IU]/1; MG/1; MG/1; MG/1; MG/1; MG/1
1 TABLET, FILM COATED ORAL
Status: DISCONTINUED | OUTPATIENT
Start: 2025-05-24 | End: 2025-05-24 | Stop reason: HOSPADM

## 2025-05-23 RX ORDER — SODIUM CHLORIDE, SODIUM LACTATE, POTASSIUM CHLORIDE, CALCIUM CHLORIDE 600; 310; 30; 20 MG/100ML; MG/100ML; MG/100ML; MG/100ML
2000 INJECTION, SOLUTION INTRAVENOUS PRN
Status: DISCONTINUED | OUTPATIENT
Start: 2025-05-23 | End: 2025-05-24 | Stop reason: HOSPADM

## 2025-05-23 RX ORDER — DIPHENOXYLATE HYDROCHLORIDE AND ATROPINE SULFATE 2.5; .025 MG/1; MG/1
1 TABLET ORAL 4 TIMES DAILY PRN
Status: DISCONTINUED | OUTPATIENT
Start: 2025-05-23 | End: 2025-05-24 | Stop reason: HOSPADM

## 2025-05-23 RX ORDER — EPHEDRINE SULFATE 50 MG/ML
5 INJECTION, SOLUTION INTRAVENOUS
OUTPATIENT
Start: 2025-05-23

## 2025-05-23 RX ORDER — CARBOPROST TROMETHAMINE 250 UG/ML
250 INJECTION, SOLUTION INTRAMUSCULAR
Status: DISCONTINUED | OUTPATIENT
Start: 2025-05-23 | End: 2025-05-24 | Stop reason: HOSPADM

## 2025-05-23 RX ORDER — ALUMINA, MAGNESIA, AND SIMETHICONE 2400; 2400; 240 MG/30ML; MG/30ML; MG/30ML
30 SUSPENSION ORAL EVERY 6 HOURS PRN
Status: DISCONTINUED | OUTPATIENT
Start: 2025-05-23 | End: 2025-05-23 | Stop reason: HOSPADM

## 2025-05-23 RX ADMIN — ROPIVACAINE HYDROCHLORIDE: 2 INJECTION, SOLUTION EPIDURAL; INFILTRATION; PERINEURAL at 14:40

## 2025-05-23 RX ADMIN — ACETAMINOPHEN 1000 MG: 500 TABLET ORAL at 22:05

## 2025-05-23 RX ADMIN — OXYTOCIN 125 ML/HR: 10 INJECTION, SOLUTION INTRAMUSCULAR; INTRAVENOUS at 20:16

## 2025-05-23 RX ADMIN — SODIUM CHLORIDE, POTASSIUM CHLORIDE, SODIUM LACTATE AND CALCIUM CHLORIDE 1000 ML: 600; 310; 30; 20 INJECTION, SOLUTION INTRAVENOUS at 14:08

## 2025-05-23 RX ADMIN — OXYTOCIN 20 UNITS: 10 INJECTION, SOLUTION INTRAMUSCULAR; INTRAVENOUS at 17:47

## 2025-05-23 RX ADMIN — DIPHENOXYLATE HYDROCHLORIDE AND ATROPINE SULFATE 1 TABLET: 2.5; .025 TABLET ORAL at 22:25

## 2025-05-23 RX ADMIN — WATER 2.5 MILLION UNITS: 1 INJECTION INTRAMUSCULAR; INTRAVENOUS; SUBCUTANEOUS at 16:40

## 2025-05-23 RX ADMIN — LIDOCAINE HYDROCHLORIDE,EPINEPHRINE BITARTRATE 3 ML: 15; .005 INJECTION, SOLUTION EPIDURAL; INFILTRATION; INTRACAUDAL; PERINEURAL at 14:18

## 2025-05-23 RX ADMIN — SODIUM CHLORIDE, POTASSIUM CHLORIDE, SODIUM LACTATE AND CALCIUM CHLORIDE: 600; 310; 30; 20 INJECTION, SOLUTION INTRAVENOUS at 12:26

## 2025-05-23 RX ADMIN — LIDOCAINE HYDROCHLORIDE 20 ML: 10 INJECTION, SOLUTION INFILTRATION; PERINEURAL at 17:54

## 2025-05-23 RX ADMIN — OXYTOCIN 1 MILLI-UNITS/MIN: 10 INJECTION, SOLUTION INTRAMUSCULAR; INTRAVENOUS at 12:31

## 2025-05-23 RX ADMIN — SODIUM CHLORIDE 5 MILLION UNITS: 900 INJECTION INTRAVENOUS at 12:27

## 2025-05-23 ASSESSMENT — EDINBURGH POSTNATAL DEPRESSION SCALE (EPDS)
I HAVE BEEN SO UNHAPPY THAT I HAVE BEEN CRYING: NO, NEVER
THE THOUGHT OF HARMING MYSELF HAS OCCURRED TO ME: NEVER
I HAVE FELT SAD OR MISERABLE: NO, NOT AT ALL
I HAVE BLAMED MYSELF UNNECESSARILY WHEN THINGS WENT WRONG: NO, NEVER
THINGS HAVE BEEN GETTING ON TOP OF ME: NO, I HAVE BEEN COPING AS WELL AS EVER
I HAVE BEEN ANXIOUS OR WORRIED FOR NO GOOD REASON: YES, SOMETIMES
I HAVE BEEN ABLE TO LAUGH AND SEE THE FUNNY SIDE OF THINGS: AS MUCH AS I ALWAYS COULD
I HAVE FELT SCARED OR PANICKY FOR NO GOOD REASON: YES, SOMETIMES
I HAVE LOOKED FORWARD WITH ENJOYMENT TO THINGS: AS MUCH AS I EVER DID
I HAVE BEEN SO UNHAPPY THAT I HAVE HAD DIFFICULTY SLEEPING: NOT AT ALL

## 2025-05-23 ASSESSMENT — PAIN DESCRIPTION - PAIN TYPE
TYPE: ACUTE PAIN

## 2025-05-23 ASSESSMENT — LIFESTYLE VARIABLES: ALCOHOL_USE: NO

## 2025-05-23 ASSESSMENT — SOCIAL DETERMINANTS OF HEALTH (SDOH)

## 2025-05-23 ASSESSMENT — FIBROSIS 4 INDEX: FIB4 SCORE: 0.45

## 2025-05-23 ASSESSMENT — PAIN SCALES - GENERAL: PAIN_LEVEL: 4

## 2025-05-23 NOTE — PROGRESS NOTES
1145: Report received from Lesly LAMBERT. POC discussed.     1415: Dr Cruz at bedside for epidural placement.     1422: Test dose negative.     1636:  Working at bedside for AROM. SVE charted.    1700: Report to Jacqueline LAMBERT. POC discussed.

## 2025-05-23 NOTE — PROGRESS NOTES
, EDC , GA 39w3d. Patient presents to L&D for scheduled elective IOL. Patient denies LOF, VB, or UCs and reports + fetal movement. Patient escorted to labor room, oriented to room and unit, and external monitors applied. POC discussed with patient and s/o and encouraged to state needs or questions at any time.    1128 SVE by this RN 3-/-2    1140 Report given to Dr. Hawkins.    1145 Report given to Brianna MOLINA RN, care relinquished.

## 2025-05-23 NOTE — PROGRESS NOTES
"Labor Note    S: Doing well. Epidural working well.     O: /56   Pulse 78   Temp 36.2 °C (97.1 °F) (Temporal)   Resp 16   Ht 1.626 m (5' 4\")   Wt 65.5 kg (144 lb 6.4 oz)   SpO2 96%   Gen: NAD  SVE: /-2 AROM, light meconium    [place]: q2-4  FHT: 140s with moderate LTV. +accels. No variables/decels    Labs: RH+, RI, GBS positive  Recent Labs     25  1135   WBC 9.0   RBC 4.62   HEMOGLOBIN 13.9   HEMATOCRIT 42.6   MCV 92.2   MCH 30.1   RDW 44.6   PLATELETCT 196   MPV 11.0   NEUTSPOLYS 60.10   LYMPHOCYTES 24.30   MONOCYTES 9.20   EOSINOPHILS 3.10   BASOPHILS 0.60     A/P 30yo  @ 39w3d, elective IOL  GBS positive    Labor: Continue pitocin  FWB: Cat I. Discussed RT for delivery  Pain: Epidural in place  GBS positive: on second dose PCN  "

## 2025-05-23 NOTE — ANESTHESIA PROCEDURE NOTES
Epidural Block    Date/Time: 5/23/2025 2:18 PM    Performed by: Karuna Cruz M.D.  Authorized by: Karuna Cruz M.D.    Patient Location:  OB  Start Time:  5/23/2025 2:18 PM  Reason for Block: labor analgesia    patient identified, IV checked, site marked, risks and benefits discussed, surgical consent, monitors and equipment checked and pre-op evaluation    Patient Position:  Sitting  Prep: ChloraPrep, patient draped and sterile technique    Monitoring:  Blood pressure, continuous pulse oximetry and heart rate  Approach:  Midline  Location:  L3-L4  Injection Technique:  NINA saline  Skin infiltration:  Lidocaine  Strength:  1%  Dose:  3ml  Needle Type:  Tuohy  Needle Gauge:  17 G  Needle Length:  3.5 in  Loss of resistance::  4  Catheter Size:  19 G  Catheter at Skin Depth:  9  Test Dose Result:  Negative

## 2025-05-23 NOTE — ANESTHESIA PREPROCEDURE EVALUATION
Date: 05/23/25  Procedure: Labor Epidural         Relevant Problems   No relevant active problems       Physical Exam    Airway   Mallampati: I  TM distance: >3 FB  Neck ROM: full       Cardiovascular - normal exam   Dental    Pulmonary Breath sounds clear to auscultation     Abdominal    Neurological - normal exam                   Anesthesia Plan    ASA 2       Plan - epidural   Neuraxial block will be labor analgesia                  Pertinent diagnostic labs and testing reviewed    Informed Consent:    Anesthetic plan and risks discussed with patient.

## 2025-05-24 VITALS
RESPIRATION RATE: 18 BRPM | WEIGHT: 144.4 LBS | HEART RATE: 78 BPM | DIASTOLIC BLOOD PRESSURE: 66 MMHG | HEIGHT: 64 IN | BODY MASS INDEX: 24.65 KG/M2 | TEMPERATURE: 98 F | OXYGEN SATURATION: 95 % | SYSTOLIC BLOOD PRESSURE: 106 MMHG

## 2025-05-24 LAB
ERYTHROCYTE [DISTWIDTH] IN BLOOD BY AUTOMATED COUNT: 45.5 FL (ref 35.9–50)
HCT VFR BLD AUTO: 39.7 % (ref 37–47)
HGB BLD-MCNC: 12.7 G/DL (ref 12–16)
MCH RBC QN AUTO: 30.1 PG (ref 27–33)
MCHC RBC AUTO-ENTMCNC: 32 G/DL (ref 32.2–35.5)
MCV RBC AUTO: 94.1 FL (ref 81.4–97.8)
PLATELET # BLD AUTO: 162 K/UL (ref 164–446)
PMV BLD AUTO: 10.9 FL (ref 9–12.9)
RBC # BLD AUTO: 4.22 M/UL (ref 4.2–5.4)
WBC # BLD AUTO: 11.9 K/UL (ref 4.8–10.8)

## 2025-05-24 PROCEDURE — 36415 COLL VENOUS BLD VENIPUNCTURE: CPT

## 2025-05-24 PROCEDURE — 85027 COMPLETE CBC AUTOMATED: CPT

## 2025-05-24 PROCEDURE — 700102 HCHG RX REV CODE 250 W/ 637 OVERRIDE(OP): Performed by: OBSTETRICS & GYNECOLOGY

## 2025-05-24 PROCEDURE — A9270 NON-COVERED ITEM OR SERVICE: HCPCS | Performed by: OBSTETRICS & GYNECOLOGY

## 2025-05-24 RX ADMIN — PRENATAL WITH FERROUS FUM AND FOLIC ACID 1 TABLET: 3080; 920; 120; 400; 22; 1.84; 3; 20; 10; 1; 12; 200; 27; 25; 2 TABLET ORAL at 08:47

## 2025-05-24 RX ADMIN — IBUPROFEN 800 MG: 800 TABLET, FILM COATED ORAL at 08:47

## 2025-05-24 RX ADMIN — ACETAMINOPHEN 1000 MG: 500 TABLET ORAL at 11:37

## 2025-05-24 RX ADMIN — IBUPROFEN 800 MG: 800 TABLET, FILM COATED ORAL at 00:18

## 2025-05-24 RX ADMIN — ACETAMINOPHEN 1000 MG: 500 TABLET ORAL at 05:37

## 2025-05-24 RX ADMIN — ACETAMINOPHEN 1000 MG: 500 TABLET ORAL at 18:21

## 2025-05-24 ASSESSMENT — PAIN DESCRIPTION - PAIN TYPE
TYPE: ACUTE PAIN

## 2025-05-24 NOTE — PROGRESS NOTES
1700: Report received from Brianna FINCH RN, care assumed.    1745: Delivery of viable male fetus, APGARs 7/9.     1855: Report given to Ariane LAMBERT, care relinquished.

## 2025-05-24 NOTE — PROGRESS NOTES
"2036: Received report from PETRA Hoyt. Assumed care at this time. Greeted pt and SO at the bedside. Whiteboard updated. Since pt did not void after delivery, notified pt that she has 6 hrs to void in the hat provided in the toilet. Pt verbalized understanding. Per report, pt's right leg is still numb from th epidural. Educated pt to call this RN before ambulating. Pt verbalized understanding.    Pitocin running at 125 mL/hr. No signs of phlebitis or infiltration at the IV site. Pt and SO oriented to room. Education provided on future lab draw, uterine fundal assessments, pain management plan, supplies, I&O sheet, and the call light system (non-urgent and urgent).     Educated pt to press the red emergency button on the side rails if she experiences:  Sudden dizziness  Gushing of blood  Soaking a pad front and back within an hour  Passing a clot bigger than an egg size  Infant cyanotic    Call light placed within reach. Answered all questions that pt and SO had and they verbalized understanding. Will notify me with the call light when needed.     2213: Pt states that she needs to void. Assisted pt to the restroom with standby assistance, gait steady. Pt sat on the toilet, and had a big gush of blood come out of her vagina. Pt voided 800 mL in the hat. Perineal care reviewed. Pt states that she had a full bladder \"for a while now.\" Educated pt that a full bladder can prevent the uterus from janna normally, thus pooling blood and forming clots. Notified her to void when she feels like her bladder is full. Pt verbalized understanding.    Blood clot removed from hat: 306 g. Pt back in bed. VS obtained. Fundus firm and two fingerbreadths below the umbilicus (deep). Notified pt to let this RN know if she does pass any more big clots. Pt verbalized understanding.     Pt has been having diarrhea. Per Dr. Hawkins, okay to order Lomotil.    0100: Pt states that she has not passed any big clots, just a clot the size of a " estefany

## 2025-05-24 NOTE — DISCHARGE SUMMARY
"Obstetrics Discharge Summary    Admission Date: 2025     Discharge Date: 2025      ADMISSION DIAGNOSIS:  1.  29-year-old  2 para 0-1-0-1 at 39 weeks and 3 days  2.  Elective induction of labor  3.  GBS positive    DISCHARGE DIAGNOSIS:  1.  Status post   2.  GBS positive with adequate treatment     DETAILS OF HOSPITAL STAY  Presenting Problem/History of Present Illness: Elective induction of labor    Hospital Course:  The patient is a 29y.o.   2 para 1-1-0-2, who presented to [facility] at 39 weeks and 3 days with a chief complaint of elective induction of labor. She had prenatal care with [facility] with Dr. Denny.  Her pregnancy was uncomplicated. She had an . Complications with delivery: None apparent. Estimated blood loss was 200 ml. The patient delivered a viable male infant weighing [id number] with Apgars as below in delivery summary.      The patient’s recovery and postpartum course were unremarkable. By postpartum day #1 patient met all appropriate milestones and was stable to be discharged to home.    APGARs:   7   9      COMPLICATIONS: None.    PHYSICAL EXAM:  Vitals: /66   Pulse 78   Temp 36.7 °C (98 °F) (Temporal)   Resp 18   Ht 1.626 m (5' 4\")   Wt 65.5 kg (144 lb 6.4 oz)   SpO2 95%   General: Alert, conversational, pleasant, no acute distress  ABD: Soft, non-tender, non-distended, fundus firm, non-tender, below the umbilicus  : Deferred  Extremities: Moves all, trace edema       RH status: Positive  Rubella Status: Immune  GBS: Positive with adequate treatment    LABS/STUDIES:   Recent Labs     25  1135 25  0434   WBC 9.0 11.9*   RBC 4.62 4.22   HEMOGLOBIN 13.9 12.7   HEMATOCRIT 42.6 39.7   MCV 92.2 94.1   MCH 30.1 30.1   RDW 44.6 45.5   PLATELETCT 196 162*   MPV 11.0 10.9   NEUTSPOLYS 60.10  --    LYMPHOCYTES 24.30  --    MONOCYTES 9.20  --    EOSINOPHILS 3.10  --    BASOPHILS 0.60  --          DISPOSITION: " Home.    DISCHARGE MEDICATIONS:  Declines. Plans OTC meds       DISCHARGE INSTRUCTIONS:  1. Pelvic rest for 6 weeks postpartum.   2. Postpartum visit in 6 weeks at [facility] [phone].  3. Return to the emergency department if experiencing increased vaginal bleeding, severe pain, temperature greater than 100.4, or any other concerns.    DISCHARGE CONDITION: Stable.    Barb Hawkins MD

## 2025-05-24 NOTE — L&D DELIVERY NOTE
Labor and Delivery  Delivery Note     Preoperative Diagnosis:   29-year-old  2 para 0-1-0-1 at 39 weeks and 3 days   Elective induction of labor  GBS positive    Procedure:     Repair of first-degree perineal laceration, right labial laceration and left periurethral laceration    Postoperative Diagnosis:   Status post   GBS positive with adequate treatment     Primary OB: Jean Denny MD   Delivering OB: Barb Hawkins MD    Anesthesia: Epidural    Labor Course:   This is a 29-year-old  2 para 0-1-0-1 at 39 weeks and 3 days who presented for an elective induction of labor.  At the time of presentation she was 3 to 4 cm.  She was started on penicillin for GBS positivity.  She was started on Pitocin.  She received an epidural.  She underwent amniotomy and progressed to complete    Procedure Note:   She pushed well to deliver baby boy OA over an intact perineum the right anterior shoulder delivered without difficulty and baby was placed on maternal stomach where he was immediately vigorous.  Cord was let to pulsate for 1 to 2 minutes at which point it was clamped and cut.  Pitocin was initiated the placenta delivered easily and intact under gentle manual traction.  Her fundus was firm and midline.  She had a first-degree perineal laceration, a right labial laceration and a left periurethral laceration.  These were repaired in the usual fashion using 3-0 Vicryl and 3-0 chromic    Findings:   Baby boy at 1745.  Apgars 7 and 9.  Weight currently pending.  Named Tip  Normal placenta with three-vessel cord  Light meconium    Medications:   Pitocin per protocol    EBL: 200 cc    Disposition: Stable to postpartum and  nursery    Barb Hawkins MD

## 2025-05-24 NOTE — ANESTHESIA POSTPROCEDURE EVALUATION
Patient: Barb Valdez    Procedure Summary       Date: 05/23/25 Room / Location:     Anesthesia Start: 1411 Anesthesia Stop: 1745    Procedure: Labor Epidural Diagnosis:     Scheduled Providers:  Responsible Provider: Karuna Cruz M.D.    Anesthesia Type: epidural ASA Status: 2            Final Anesthesia Type: epidural  Last vitals  BP   Blood Pressure: 114/56    Temp   36.2 °C (97.1 °F)    Pulse   81   Resp   16    SpO2   96 %      Anesthesia Post Evaluation    Patient location during evaluation: bedside  Patient participation: complete - patient participated  Level of consciousness: awake and alert  Pain score: 4    Airway patency: patent  Anesthetic complications: no  Cardiovascular status: hemodynamically stable  Respiratory status: acceptable  Hydration status: euvolemic    PONV: none          No notable events documented.     Nurse Pain Score: 4 (NPRS)

## 2025-05-24 NOTE — LACTATION NOTE
Initial visit  MOB is , P2 @ 39+3  Baby boy Tip birth wt7#12.3oz/3525gm  MOB states baby stayed skin to skin after delivery and latched well. She states she struggles to latch on right breast.   MOB first baby was LPI and used nipple shield. She  and pumped for 1 year with plentiful supply. She was assisted with bfdg at [facility] and I encouraged her to call them for followup with this baby.  Baby is skin to skin. Benefits of skin to skin and delayed bath discussed with MOB.  Reviewed hunger cues and feeding on cue without time limits.  MOB instructed to call for latch assist/assessment when baby shows cues again.  F/U @ 1100:   Peds in to assess baby. Baby offered left breast and MOB independently latches baby with deep comfortable latch. She denies pain with suckling. Intermittent swallows observed.  Discussed hand expressing after most feedings and feeding back her EBM to baby until his stools are yellow and seedy. Discussed benefits for mo and baby of same.MOB states she was an over supplier with her first baby. Encouraged to hand express and feed back her EBM to baby 4-5 times a day for the first 5 days, or if baby is not waking for a feeding.  Discussed signs/symptoms of mastitis vs clogged duct.  Baby taken for circumcision after feeding. Discussed common sleepiness after a circumcision and importance of skin to skin. Encouraged to call for help with latching as needed.  Given post d/c bfdg resources handout.

## 2025-05-24 NOTE — PROGRESS NOTES
1855 bedside report received from Jacqueline LAMBERT. Care assumed.    2010 Pt's right leg still numb. Pericare done in bed, pad and panty applied. Pt denies pain. Pt side stepped to wheelchair with two RN assist. Pt tolerated well.    2020 pt taken via wheelchair with infant in arms to 344. Two RN assisted pt to bed. Bedside report given to PETRA Hoyt. Care relinquished.

## 2025-05-24 NOTE — DISCHARGE PLANNING
Discharge Planning Assessment Post Partum    Reviewed record and met with parents of infant at PP bedside    Reason for Referral: MOB has a history of depression, on Lexapro  Address: [address] [place] [address] in [place]  Type of Living Situation:stable  Mom Diagnosis: post partum  Baby Diagnosis:   Primary Language: English    Name of Baby: Tip Valdez  Father of the Baby: Fam Valdez  Involved in baby’s care? yes  Contact Information: [phone]    Prenatal Care: Dr. Denny  Mom's PCP: Justina Srinivasan    Support System: family  Coping/Bonding between mother & baby: appropriate  Source of Feeding: breast  Supplies for Infant: prepared    Mom's Insurance: Pixelated  Baby Covered on Insurance:yes  Mother Employed/School: [employer]  Other children in the home: 1 other child    Financial Hardship/Income: denies   Mom's Mental status: alert and oriented  Services used prior to admit: none    CPS History: nonne  Psychiatric History: depression - mother states she has PP depression after birth of first child. She has taken excellent care of her mental health, saw a therapist, plans to continue, manages with medication with good results. No needs  Domestic Violence History: none  Drug/ETOH History: none    Mother denies need for any resources or referrals     Clearance for Discharge: Infant to discharge home when ready

## 2025-05-24 NOTE — PROGRESS NOTES
0700- Received report from PETRA Salinas. Assumed care. 12 hour chart check, MAR and orders reviewed.      0830- Assessment complete. Fundus firm and palpable, lochia light/scant rubra. Pain management and interventions discussed with pt. SO at bedside. POC discussed. All questions and concerns discussed. No further concerns.

## 2025-05-24 NOTE — ANESTHESIA TIME REPORT
Anesthesia Start and Stop Event Times       Date Time Event    5/23/2025 1411 Ready for Procedure     1411 Anesthesia Start     1745 Anesthesia Stop          Responsible Staff  05/23/25      Name Role Begin End    Karuna Cruz M.D. Anesth 1411 174          Overtime Reason:  no overtime (within assigned shift)    Comments:                                                           Negative

## 2025-05-24 NOTE — CARE PLAN
The patient is Stable - Low risk of patient condition declining or worsening    Shift Goals  Clinical Goals: lochia WDL; pain control    Progress made toward(s) clinical / shift goals:  Lochia scant to light. Pain controlled through PRN pain medication administrations, tucks, spray, and ice.    Patient is not progressing towards the following goals:

## 2025-05-24 NOTE — CARE PLAN
The patient is Stable - Low risk of patient condition declining or worsening    Shift Goals  Clinical Goals: VSS, pain management, rest, fundus firm, lochia wdl    Progress made toward(s) clinical / shift goals:    Problem: Knowledge Deficit - Postpartum  Goal: Patient will verbalize and demonstrate understanding of self and infant care  Description: Target End Date:  1-3 days or as soon as patient condition allowsDocument in Patient Education1.  Assess patient and knowledge of self and infant care2.  Educate patient verbally, by demonstration and written material  Outcome: Progressing     Problem: Psychosocial - Postpartum  Goal: Patient will verbalize and demonstrate effective bonding and parenting behavior  Description: Target End Date:  1 to 4 days1.  Assess patient for anxiety or apprehension regarding parenting role2.  Provide emotional support and encouragement to patient/family/caregiver  Outcome: Progressing     Problem: Altered Physiologic Condition  Goal: Patient physiologically stable as evidenced by normal lochia, palpable uterine involution and vitals within normal limits  Description: Target End Date:  1 to 4 daysDocument on Assessment flowsheet1.  Perform physical assessment and obtain vitals per intrapartum/postpartum standards of care2.  Follow epidural/spinal narcotic protocol if patient has received a long acting narcotic3.  Massage fundus as necessary to prevent excessive lochia4.  Administer pitocin, methergine, cytotec or hemabate as ordered  Outcome: Progressing       Patient is not progressing towards the following goals:

## 2025-05-25 NOTE — PROGRESS NOTES
Discharge paperwork for infant and mom discussed at bedside. All questions answered. Follow-up appointments reviewed. Parents aware of NBS #2 and dates to complete it by. Paperwork signed and dated at this time.    1923- Patient and infant discharged with escort off unit. Infant discharged in car seat, patient in wheelchair. Infant placed in car seat by parents and checked by RN. Cuddles removed. Bands verified. All questions answered at this time.

## 2025-05-25 NOTE — DISCHARGE INSTRUCTIONS
